# Patient Record
Sex: FEMALE | Race: BLACK OR AFRICAN AMERICAN | NOT HISPANIC OR LATINO | Employment: OTHER | URBAN - NONMETROPOLITAN AREA
[De-identification: names, ages, dates, MRNs, and addresses within clinical notes are randomized per-mention and may not be internally consistent; named-entity substitution may affect disease eponyms.]

---

## 2021-03-30 RX ORDER — METFORMIN HYDROCHLORIDE 500 MG/1
500 TABLET ORAL 2 TIMES DAILY
COMMUNITY
End: 2021-09-14 | Stop reason: SDUPTHER

## 2021-03-30 RX ORDER — CLONIDINE HYDROCHLORIDE 0.2 MG/1
TABLET ORAL
COMMUNITY
End: 2021-12-07 | Stop reason: SDUPTHER

## 2021-03-30 RX ORDER — PRAVASTATIN SODIUM 20 MG/1
20 TABLET ORAL NIGHTLY
COMMUNITY
End: 2021-12-07 | Stop reason: SDUPTHER

## 2021-03-30 RX ORDER — MECLIZINE HCL 12.5 MG 12.5 MG/1
12.5 TABLET ORAL 3 TIMES DAILY PRN
COMMUNITY
End: 2021-09-14 | Stop reason: SDUPTHER

## 2021-03-30 RX ORDER — OMEPRAZOLE 20 MG/1
20 CAPSULE, DELAYED RELEASE ORAL 2 TIMES DAILY
COMMUNITY
End: 2021-09-14 | Stop reason: SDUPTHER

## 2021-03-30 RX ORDER — DICLOFENAC SODIUM 50 MG/1
50 TABLET, DELAYED RELEASE ORAL 2 TIMES DAILY
COMMUNITY
End: 2023-05-26

## 2021-03-30 RX ORDER — FERROUS SULFATE 325(65) MG
325 TABLET ORAL DAILY
COMMUNITY
End: 2021-06-23 | Stop reason: SDUPTHER

## 2021-03-30 RX ORDER — HYDROCHLOROTHIAZIDE 25 MG/1
25 TABLET ORAL DAILY
COMMUNITY
End: 2021-12-07 | Stop reason: SDUPTHER

## 2021-03-30 RX ORDER — TRIAMCINOLONE ACETONIDE 1 MG/G
CREAM TOPICAL 2 TIMES DAILY
COMMUNITY
End: 2022-09-22 | Stop reason: SDUPTHER

## 2021-03-30 RX ORDER — CARVEDILOL 3.12 MG/1
3.12 TABLET ORAL 2 TIMES DAILY
COMMUNITY
End: 2021-11-11 | Stop reason: SDUPTHER

## 2021-03-30 RX ORDER — AMLODIPINE AND BENAZEPRIL HYDROCHLORIDE 5; 10 MG/1; MG/1
1 CAPSULE ORAL DAILY
COMMUNITY
End: 2021-12-07 | Stop reason: SDUPTHER

## 2021-03-30 RX ORDER — ALPRAZOLAM 0.25 MG/1
TABLET ORAL 2 TIMES DAILY PRN
COMMUNITY
End: 2021-06-23 | Stop reason: SDUPTHER

## 2021-03-30 RX ORDER — DICLOFENAC SODIUM 10 MG/G
2 GEL TOPICAL 2 TIMES DAILY
COMMUNITY
End: 2023-05-26

## 2021-06-23 ENCOUNTER — OFFICE VISIT (OUTPATIENT)
Dept: FAMILY MEDICINE | Facility: CLINIC | Age: 76
End: 2021-06-23
Payer: MEDICARE

## 2021-06-23 VITALS
DIASTOLIC BLOOD PRESSURE: 82 MMHG | OXYGEN SATURATION: 99 % | TEMPERATURE: 97 F | SYSTOLIC BLOOD PRESSURE: 120 MMHG | WEIGHT: 146.63 LBS | HEART RATE: 82 BPM | RESPIRATION RATE: 18 BRPM

## 2021-06-23 DIAGNOSIS — D50.9 IRON DEFICIENCY ANEMIA, UNSPECIFIED IRON DEFICIENCY ANEMIA TYPE: Primary | ICD-10-CM

## 2021-06-23 DIAGNOSIS — F41.9 ANXIETY: ICD-10-CM

## 2021-06-23 LAB
BASOPHILS # BLD AUTO: 0.03 K/UL (ref 0–0.2)
BASOPHILS NFR BLD AUTO: 0.6 % (ref 0–1)
DIFFERENTIAL METHOD BLD: ABNORMAL
EOSINOPHIL # BLD AUTO: 0.39 K/UL (ref 0–0.5)
EOSINOPHIL NFR BLD AUTO: 7.6 % (ref 1–4)
ERYTHROCYTE [DISTWIDTH] IN BLOOD BY AUTOMATED COUNT: 12.3 % (ref 11.5–14.5)
HCT VFR BLD AUTO: 36.6 % (ref 38–47)
HGB BLD-MCNC: 12.2 G/DL (ref 12–16)
IMM GRANULOCYTES # BLD AUTO: 0.01 K/UL (ref 0–0.04)
IMM GRANULOCYTES NFR BLD: 0.2 % (ref 0–0.4)
IRON SATN MFR SERPL: 24 % (ref 14–50)
IRON SERPL-MCNC: 69 ΜG/DL (ref 50–170)
LYMPHOCYTES # BLD AUTO: 2.24 K/UL (ref 1–4.8)
LYMPHOCYTES NFR BLD AUTO: 43.9 % (ref 27–41)
MCH RBC QN AUTO: 31.5 PG (ref 27–31)
MCHC RBC AUTO-ENTMCNC: 33.3 G/DL (ref 32–36)
MCV RBC AUTO: 94.6 FL (ref 80–96)
MONOCYTES # BLD AUTO: 0.53 K/UL (ref 0–0.8)
MONOCYTES NFR BLD AUTO: 10.4 % (ref 2–6)
MPC BLD CALC-MCNC: 11.3 FL (ref 9.4–12.4)
NEUTROPHILS # BLD AUTO: 1.9 K/UL (ref 1.8–7.7)
NEUTROPHILS NFR BLD AUTO: 37.3 % (ref 53–65)
NRBC # BLD AUTO: 0 X10E3/UL
NRBC, AUTO (.00): 0 %
PLATELET # BLD AUTO: 294 K/UL (ref 150–400)
RBC # BLD AUTO: 3.87 M/UL (ref 4.2–5.4)
TIBC SERPL-MCNC: 284 ΜG/DL (ref 250–450)
WBC # BLD AUTO: 5.1 K/UL (ref 4.5–11)

## 2021-06-23 PROCEDURE — 83540 ASSAY OF IRON: CPT | Mod: ,,, | Performed by: CLINICAL MEDICAL LABORATORY

## 2021-06-23 PROCEDURE — 85025 CBC WITH DIFFERENTIAL: ICD-10-PCS | Mod: ,,, | Performed by: CLINICAL MEDICAL LABORATORY

## 2021-06-23 PROCEDURE — 83550 IRON BINDING TEST: CPT | Mod: ,,, | Performed by: CLINICAL MEDICAL LABORATORY

## 2021-06-23 PROCEDURE — 83540 IRON AND TIBC: ICD-10-PCS | Mod: ,,, | Performed by: CLINICAL MEDICAL LABORATORY

## 2021-06-23 PROCEDURE — 83550 IRON AND TIBC: ICD-10-PCS | Mod: ,,, | Performed by: CLINICAL MEDICAL LABORATORY

## 2021-06-23 PROCEDURE — 99213 PR OFFICE/OUTPT VISIT, EST, LEVL III, 20-29 MIN: ICD-10-PCS | Mod: ,,, | Performed by: NURSE PRACTITIONER

## 2021-06-23 PROCEDURE — 99213 OFFICE O/P EST LOW 20 MIN: CPT | Mod: ,,, | Performed by: NURSE PRACTITIONER

## 2021-06-23 PROCEDURE — 85025 COMPLETE CBC W/AUTO DIFF WBC: CPT | Mod: ,,, | Performed by: CLINICAL MEDICAL LABORATORY

## 2021-06-23 RX ORDER — FERROUS SULFATE 325(65) MG
325 TABLET ORAL DAILY
Qty: 90 TABLET | Refills: 1 | Status: SHIPPED | OUTPATIENT
Start: 2021-06-23 | End: 2021-12-07 | Stop reason: SDUPTHER

## 2021-06-23 RX ORDER — ALPRAZOLAM 0.25 MG/1
0.25 TABLET ORAL 2 TIMES DAILY PRN
Qty: 60 TABLET | Refills: 2 | Status: SHIPPED | OUTPATIENT
Start: 2021-06-23 | End: 2021-09-22 | Stop reason: SDUPTHER

## 2021-09-14 DIAGNOSIS — R42 DIZZINESS: ICD-10-CM

## 2021-09-14 DIAGNOSIS — E11.9 TYPE 2 DIABETES MELLITUS WITHOUT COMPLICATION, WITHOUT LONG-TERM CURRENT USE OF INSULIN: ICD-10-CM

## 2021-09-14 DIAGNOSIS — K21.9 GASTROESOPHAGEAL REFLUX DISEASE, UNSPECIFIED WHETHER ESOPHAGITIS PRESENT: Primary | ICD-10-CM

## 2021-09-14 RX ORDER — MECLIZINE HCL 12.5 MG 12.5 MG/1
12.5 TABLET ORAL 3 TIMES DAILY PRN
Qty: 90 TABLET | Refills: 5 | Status: SHIPPED | OUTPATIENT
Start: 2021-09-14 | End: 2021-12-21 | Stop reason: DRUGHIGH

## 2021-09-14 RX ORDER — METFORMIN HYDROCHLORIDE 500 MG/1
500 TABLET ORAL 2 TIMES DAILY
Qty: 180 TABLET | Refills: 0 | Status: SHIPPED | OUTPATIENT
Start: 2021-09-14 | End: 2021-12-07 | Stop reason: SDUPTHER

## 2021-09-14 RX ORDER — OMEPRAZOLE 20 MG/1
20 CAPSULE, DELAYED RELEASE ORAL 2 TIMES DAILY
Qty: 180 CAPSULE | Refills: 1 | Status: SHIPPED | OUTPATIENT
Start: 2021-09-14 | End: 2021-12-21 | Stop reason: SDUPTHER

## 2021-09-22 ENCOUNTER — OFFICE VISIT (OUTPATIENT)
Dept: FAMILY MEDICINE | Facility: CLINIC | Age: 76
End: 2021-09-22
Payer: MEDICARE

## 2021-09-22 VITALS
SYSTOLIC BLOOD PRESSURE: 124 MMHG | DIASTOLIC BLOOD PRESSURE: 82 MMHG | WEIGHT: 147.19 LBS | TEMPERATURE: 97 F | BODY MASS INDEX: 25.13 KG/M2 | OXYGEN SATURATION: 99 % | HEIGHT: 64 IN | RESPIRATION RATE: 20 BRPM | HEART RATE: 78 BPM

## 2021-09-22 DIAGNOSIS — E83.59 OTHER DISORDERS OF CALCIUM METABOLISM: ICD-10-CM

## 2021-09-22 DIAGNOSIS — F41.9 ANXIETY: ICD-10-CM

## 2021-09-22 DIAGNOSIS — R53.83 FATIGUE, UNSPECIFIED TYPE: Primary | ICD-10-CM

## 2021-09-22 DIAGNOSIS — I10 ESSENTIAL HYPERTENSION, BENIGN: ICD-10-CM

## 2021-09-22 DIAGNOSIS — D53.9 NUTRITIONAL ANEMIA, UNSPECIFIED: ICD-10-CM

## 2021-09-22 DIAGNOSIS — E11.9 TYPE 2 DIABETES MELLITUS WITHOUT COMPLICATION, WITHOUT LONG-TERM CURRENT USE OF INSULIN: ICD-10-CM

## 2021-09-22 DIAGNOSIS — K21.9 GASTROESOPHAGEAL REFLUX DISEASE, UNSPECIFIED WHETHER ESOPHAGITIS PRESENT: ICD-10-CM

## 2021-09-22 LAB
25(OH)D3 SERPL-MCNC: 28.1 NG/ML
ALBUMIN SERPL BCP-MCNC: 3.9 G/DL (ref 3.5–5)
ALBUMIN/GLOB SERPL: 1 {RATIO}
ALP SERPL-CCNC: 110 U/L (ref 55–142)
ALT SERPL W P-5'-P-CCNC: 16 U/L (ref 13–56)
ANION GAP SERPL CALCULATED.3IONS-SCNC: 10 MMOL/L (ref 7–16)
AST SERPL W P-5'-P-CCNC: 9 U/L (ref 15–37)
BASOPHILS # BLD AUTO: 0.02 K/UL (ref 0–0.2)
BASOPHILS NFR BLD AUTO: 0.4 % (ref 0–1)
BILIRUB SERPL-MCNC: 0.3 MG/DL (ref 0–1.2)
BUN SERPL-MCNC: 8 MG/DL (ref 7–18)
BUN/CREAT SERPL: 10 (ref 6–20)
CALCIUM SERPL-MCNC: 9.9 MG/DL (ref 8.5–10.1)
CHLORIDE SERPL-SCNC: 94 MMOL/L (ref 98–107)
CHOLEST SERPL-MCNC: 207 MG/DL (ref 0–200)
CHOLEST/HDLC SERPL: 2.7 {RATIO}
CO2 SERPL-SCNC: 28 MMOL/L (ref 21–32)
CREAT SERPL-MCNC: 0.81 MG/DL (ref 0.55–1.02)
DIFFERENTIAL METHOD BLD: ABNORMAL
EOSINOPHIL # BLD AUTO: 0.23 K/UL (ref 0–0.5)
EOSINOPHIL NFR BLD AUTO: 4.9 % (ref 1–4)
ERYTHROCYTE [DISTWIDTH] IN BLOOD BY AUTOMATED COUNT: 12.2 % (ref 11.5–14.5)
EST. AVERAGE GLUCOSE BLD GHB EST-MCNC: 117 MG/DL
GLOBULIN SER-MCNC: 4 G/DL (ref 2–4)
GLUCOSE SERPL-MCNC: 126 MG/DL (ref 74–106)
HBA1C MFR BLD HPLC: 6.1 % (ref 4.5–6.6)
HCT VFR BLD AUTO: 32.3 % (ref 38–47)
HDLC SERPL-MCNC: 78 MG/DL (ref 40–60)
HGB BLD-MCNC: 11.3 G/DL (ref 12–16)
IMM GRANULOCYTES # BLD AUTO: 0.01 K/UL (ref 0–0.04)
IMM GRANULOCYTES NFR BLD: 0.2 % (ref 0–0.4)
LDLC SERPL CALC-MCNC: 115 MG/DL
LDLC/HDLC SERPL: 1.5 {RATIO}
LYMPHOCYTES # BLD AUTO: 1.88 K/UL (ref 1–4.8)
LYMPHOCYTES NFR BLD AUTO: 40.2 % (ref 27–41)
MCH RBC QN AUTO: 32.5 PG (ref 27–31)
MCHC RBC AUTO-ENTMCNC: 35 G/DL (ref 32–36)
MCV RBC AUTO: 92.8 FL (ref 80–96)
MONOCYTES # BLD AUTO: 0.59 K/UL (ref 0–0.8)
MONOCYTES NFR BLD AUTO: 12.6 % (ref 2–6)
MPC BLD CALC-MCNC: 10.9 FL (ref 9.4–12.4)
NEUTROPHILS # BLD AUTO: 1.95 K/UL (ref 1.8–7.7)
NEUTROPHILS NFR BLD AUTO: 41.7 % (ref 53–65)
NONHDLC SERPL-MCNC: 129 MG/DL
NRBC # BLD AUTO: 0 X10E3/UL
NRBC, AUTO (.00): 0 %
PLATELET # BLD AUTO: 283 K/UL (ref 150–400)
POTASSIUM SERPL-SCNC: 3.2 MMOL/L (ref 3.5–5.1)
PROT SERPL-MCNC: 7.9 G/DL (ref 6.4–8.2)
RBC # BLD AUTO: 3.48 M/UL (ref 4.2–5.4)
SODIUM SERPL-SCNC: 129 MMOL/L (ref 136–145)
TRIGL SERPL-MCNC: 68 MG/DL (ref 35–150)
VIT B12 SERPL-MCNC: 776 PG/ML (ref 193–986)
VLDLC SERPL-MCNC: 14 MG/DL
WBC # BLD AUTO: 4.68 K/UL (ref 4.5–11)

## 2021-09-22 PROCEDURE — 82306 VITAMIN D 25 HYDROXY: CPT | Mod: ,,, | Performed by: CLINICAL MEDICAL LABORATORY

## 2021-09-22 PROCEDURE — 80053 COMPREHEN METABOLIC PANEL: CPT | Mod: ,,, | Performed by: CLINICAL MEDICAL LABORATORY

## 2021-09-22 PROCEDURE — 82607 VITAMIN B-12: CPT | Mod: ,,, | Performed by: CLINICAL MEDICAL LABORATORY

## 2021-09-22 PROCEDURE — 85025 COMPLETE CBC W/AUTO DIFF WBC: CPT | Mod: ,,, | Performed by: CLINICAL MEDICAL LABORATORY

## 2021-09-22 PROCEDURE — 99213 OFFICE O/P EST LOW 20 MIN: CPT | Mod: ,,, | Performed by: NURSE PRACTITIONER

## 2021-09-22 PROCEDURE — 85025 CBC WITH DIFFERENTIAL: ICD-10-PCS | Mod: ,,, | Performed by: CLINICAL MEDICAL LABORATORY

## 2021-09-22 PROCEDURE — 83036 HEMOGLOBIN GLYCOSYLATED A1C: CPT | Mod: ,,, | Performed by: CLINICAL MEDICAL LABORATORY

## 2021-09-22 PROCEDURE — 83036 HEMOGLOBIN A1C: ICD-10-PCS | Mod: ,,, | Performed by: CLINICAL MEDICAL LABORATORY

## 2021-09-22 PROCEDURE — 80061 LIPID PANEL: ICD-10-PCS | Mod: ,,, | Performed by: CLINICAL MEDICAL LABORATORY

## 2021-09-22 PROCEDURE — 80053 COMPREHENSIVE METABOLIC PANEL: ICD-10-PCS | Mod: ,,, | Performed by: CLINICAL MEDICAL LABORATORY

## 2021-09-22 PROCEDURE — 80061 LIPID PANEL: CPT | Mod: ,,, | Performed by: CLINICAL MEDICAL LABORATORY

## 2021-09-22 PROCEDURE — 82607 VITAMIN B12: ICD-10-PCS | Mod: ,,, | Performed by: CLINICAL MEDICAL LABORATORY

## 2021-09-22 PROCEDURE — 99213 PR OFFICE/OUTPT VISIT, EST, LEVL III, 20-29 MIN: ICD-10-PCS | Mod: ,,, | Performed by: NURSE PRACTITIONER

## 2021-09-22 PROCEDURE — 82306 VITAMIN D: ICD-10-PCS | Mod: ,,, | Performed by: CLINICAL MEDICAL LABORATORY

## 2021-09-22 RX ORDER — TIZANIDINE 2 MG/1
TABLET ORAL
COMMUNITY
Start: 2021-04-13 | End: 2023-05-26

## 2021-09-22 RX ORDER — FLUTICASONE PROPIONATE 50 MCG
SPRAY, SUSPENSION (ML) NASAL
COMMUNITY
Start: 2021-05-26 | End: 2021-12-07 | Stop reason: SDUPTHER

## 2021-09-22 RX ORDER — MULTIVITAMIN WITH MINERALS
1 TABLET ORAL DAILY
COMMUNITY
Start: 2021-03-31 | End: 2021-09-22

## 2021-09-22 RX ORDER — ALPRAZOLAM 0.25 MG/1
0.25 TABLET ORAL 2 TIMES DAILY PRN
Qty: 60 TABLET | Refills: 2 | Status: SHIPPED | OUTPATIENT
Start: 2021-09-22 | End: 2021-12-21 | Stop reason: SDUPTHER

## 2021-11-12 RX ORDER — CARVEDILOL 3.12 MG/1
3.12 TABLET ORAL 2 TIMES DAILY
Qty: 60 TABLET | Refills: 0 | Status: SHIPPED | OUTPATIENT
Start: 2021-11-12 | End: 2021-12-07 | Stop reason: SDUPTHER

## 2021-12-07 DIAGNOSIS — E11.9 TYPE 2 DIABETES MELLITUS WITHOUT COMPLICATION, WITHOUT LONG-TERM CURRENT USE OF INSULIN: ICD-10-CM

## 2021-12-07 DIAGNOSIS — D50.9 IRON DEFICIENCY ANEMIA, UNSPECIFIED IRON DEFICIENCY ANEMIA TYPE: ICD-10-CM

## 2021-12-07 RX ORDER — HYDROCHLOROTHIAZIDE 25 MG/1
25 TABLET ORAL DAILY
Qty: 90 TABLET | Refills: 1 | Status: SHIPPED | OUTPATIENT
Start: 2021-12-07 | End: 2022-03-24 | Stop reason: SDUPTHER

## 2021-12-07 RX ORDER — CLONIDINE HYDROCHLORIDE 0.2 MG/1
TABLET ORAL
Qty: 180 TABLET | Refills: 0 | Status: SHIPPED | OUTPATIENT
Start: 2021-12-07 | End: 2022-03-24 | Stop reason: SDUPTHER

## 2021-12-07 RX ORDER — PRAVASTATIN SODIUM 20 MG/1
20 TABLET ORAL NIGHTLY
Qty: 90 TABLET | Refills: 1 | Status: SHIPPED | OUTPATIENT
Start: 2021-12-07 | End: 2022-03-24 | Stop reason: SDUPTHER

## 2021-12-07 RX ORDER — FLUTICASONE PROPIONATE 50 MCG
SPRAY, SUSPENSION (ML) NASAL
Qty: 48 G | Refills: 2 | Status: SHIPPED | OUTPATIENT
Start: 2021-12-07 | End: 2022-06-24 | Stop reason: SDUPTHER

## 2021-12-07 RX ORDER — AMLODIPINE AND BENAZEPRIL HYDROCHLORIDE 5; 10 MG/1; MG/1
1 CAPSULE ORAL DAILY
Qty: 90 CAPSULE | Refills: 1 | Status: SHIPPED | OUTPATIENT
Start: 2021-12-07 | End: 2021-12-21

## 2021-12-07 RX ORDER — FERROUS SULFATE 325(65) MG
325 TABLET ORAL DAILY
Qty: 90 TABLET | Refills: 1 | Status: SHIPPED | OUTPATIENT
Start: 2021-12-07 | End: 2022-03-24 | Stop reason: SDUPTHER

## 2021-12-07 RX ORDER — CARVEDILOL 3.12 MG/1
3.12 TABLET ORAL 2 TIMES DAILY
Qty: 180 TABLET | Refills: 1 | Status: SHIPPED | OUTPATIENT
Start: 2021-12-07 | End: 2021-12-21 | Stop reason: DRUGHIGH

## 2021-12-07 RX ORDER — METFORMIN HYDROCHLORIDE 500 MG/1
500 TABLET ORAL 2 TIMES DAILY
Qty: 180 TABLET | Refills: 0 | Status: SHIPPED | OUTPATIENT
Start: 2021-12-07 | End: 2022-03-24 | Stop reason: SDUPTHER

## 2021-12-21 ENCOUNTER — OFFICE VISIT (OUTPATIENT)
Dept: FAMILY MEDICINE | Facility: CLINIC | Age: 76
End: 2021-12-21
Payer: MEDICARE

## 2021-12-21 VITALS
WEIGHT: 148.81 LBS | SYSTOLIC BLOOD PRESSURE: 126 MMHG | DIASTOLIC BLOOD PRESSURE: 76 MMHG | BODY MASS INDEX: 25.41 KG/M2 | OXYGEN SATURATION: 99 % | HEART RATE: 84 BPM | HEIGHT: 64 IN | TEMPERATURE: 97 F | RESPIRATION RATE: 20 BRPM

## 2021-12-21 DIAGNOSIS — R42 DIZZINESS: ICD-10-CM

## 2021-12-21 DIAGNOSIS — F41.9 ANXIETY: ICD-10-CM

## 2021-12-21 DIAGNOSIS — K21.9 GASTROESOPHAGEAL REFLUX DISEASE, UNSPECIFIED WHETHER ESOPHAGITIS PRESENT: ICD-10-CM

## 2021-12-21 DIAGNOSIS — I10 ESSENTIAL HYPERTENSION, BENIGN: Primary | ICD-10-CM

## 2021-12-21 PROCEDURE — 99213 OFFICE O/P EST LOW 20 MIN: CPT | Mod: ,,, | Performed by: NURSE PRACTITIONER

## 2021-12-21 PROCEDURE — 99213 PR OFFICE/OUTPT VISIT, EST, LEVL III, 20-29 MIN: ICD-10-PCS | Mod: ,,, | Performed by: NURSE PRACTITIONER

## 2021-12-21 RX ORDER — CARVEDILOL 6.25 MG/1
6.25 TABLET ORAL 2 TIMES DAILY WITH MEALS
COMMUNITY
Start: 2021-12-16 | End: 2021-12-21 | Stop reason: SDUPTHER

## 2021-12-21 RX ORDER — CARVEDILOL 6.25 MG/1
6.25 TABLET ORAL 2 TIMES DAILY WITH MEALS
Qty: 180 TABLET | Refills: 1 | Status: SHIPPED | OUTPATIENT
Start: 2021-12-21 | End: 2022-03-24 | Stop reason: SDUPTHER

## 2021-12-21 RX ORDER — AMLODIPINE BESYLATE 10 MG/1
10 TABLET ORAL DAILY
COMMUNITY
End: 2021-12-21 | Stop reason: SDUPTHER

## 2021-12-21 RX ORDER — MECLIZINE HYDROCHLORIDE 25 MG/1
25 TABLET ORAL 3 TIMES DAILY PRN
Qty: 180 TABLET | Refills: 2 | Status: SHIPPED | OUTPATIENT
Start: 2021-12-21 | End: 2022-03-24

## 2021-12-21 RX ORDER — ALPRAZOLAM 0.25 MG/1
0.25 TABLET ORAL 2 TIMES DAILY PRN
Qty: 60 TABLET | Refills: 2 | Status: SHIPPED | OUTPATIENT
Start: 2021-12-21 | End: 2022-03-24 | Stop reason: SDUPTHER

## 2021-12-21 RX ORDER — AMLODIPINE BESYLATE 10 MG/1
10 TABLET ORAL DAILY
Qty: 90 TABLET | Refills: 1 | Status: SHIPPED | OUTPATIENT
Start: 2021-12-21 | End: 2022-03-24 | Stop reason: SDUPTHER

## 2021-12-21 RX ORDER — MECLIZINE HCL 12.5 MG 12.5 MG/1
12.5 TABLET ORAL 3 TIMES DAILY PRN
Qty: 270 TABLET | Refills: 1 | Status: CANCELLED | OUTPATIENT
Start: 2021-12-21

## 2021-12-21 RX ORDER — OMEPRAZOLE 20 MG/1
20 CAPSULE, DELAYED RELEASE ORAL 2 TIMES DAILY
Qty: 180 CAPSULE | Refills: 1 | Status: SHIPPED | OUTPATIENT
Start: 2021-12-21 | End: 2022-03-14 | Stop reason: SDUPTHER

## 2021-12-28 ENCOUNTER — OFFICE VISIT (OUTPATIENT)
Dept: FAMILY MEDICINE | Facility: CLINIC | Age: 76
End: 2021-12-28
Payer: MEDICARE

## 2021-12-28 ENCOUNTER — IMMUNIZATION (OUTPATIENT)
Dept: FAMILY MEDICINE | Facility: CLINIC | Age: 76
End: 2021-12-28
Payer: MEDICARE

## 2021-12-28 VITALS
HEIGHT: 64 IN | WEIGHT: 148.19 LBS | DIASTOLIC BLOOD PRESSURE: 80 MMHG | RESPIRATION RATE: 18 BRPM | HEART RATE: 86 BPM | SYSTOLIC BLOOD PRESSURE: 126 MMHG | TEMPERATURE: 96 F | OXYGEN SATURATION: 98 % | BODY MASS INDEX: 25.3 KG/M2

## 2021-12-28 DIAGNOSIS — Z23 NEED FOR VACCINATION: Primary | ICD-10-CM

## 2021-12-28 DIAGNOSIS — Z00.00 WELL ADULT EXAM: Primary | ICD-10-CM

## 2021-12-28 PROCEDURE — G0439 PR MEDICARE ANNUAL WELLNESS SUBSEQUENT VISIT: ICD-10-PCS | Mod: ,,, | Performed by: NURSE PRACTITIONER

## 2021-12-28 PROCEDURE — 0064A COVID-19, MRNA, LNP-S, PF, 100 MCG/0.25 ML DOSE VACCINE (MODERNA BOOSTER): ICD-10-PCS | Mod: ,,, | Performed by: NURSE PRACTITIONER

## 2021-12-28 PROCEDURE — 91306 COVID-19, MRNA, LNP-S, PF, 100 MCG/0.25 ML DOSE VACCINE (MODERNA BOOSTER): CPT | Mod: ,,, | Performed by: NURSE PRACTITIONER

## 2021-12-28 PROCEDURE — 0064A COVID-19, MRNA, LNP-S, PF, 100 MCG/0.25 ML DOSE VACCINE (MODERNA BOOSTER): CPT | Mod: ,,, | Performed by: NURSE PRACTITIONER

## 2021-12-28 PROCEDURE — 91306 COVID-19, MRNA, LNP-S, PF, 100 MCG/0.25 ML DOSE VACCINE (MODERNA BOOSTER): ICD-10-PCS | Mod: ,,, | Performed by: NURSE PRACTITIONER

## 2021-12-28 PROCEDURE — 99499 NO LOS: ICD-10-PCS | Mod: ,,, | Performed by: NURSE PRACTITIONER

## 2021-12-28 PROCEDURE — 99499 UNLISTED E&M SERVICE: CPT | Mod: ,,, | Performed by: NURSE PRACTITIONER

## 2021-12-28 PROCEDURE — G0439 PPPS, SUBSEQ VISIT: HCPCS | Mod: ,,, | Performed by: NURSE PRACTITIONER

## 2022-01-11 ENCOUNTER — APPOINTMENT (OUTPATIENT)
Dept: RADIOLOGY | Facility: CLINIC | Age: 77
End: 2022-01-11
Attending: NURSE PRACTITIONER
Payer: MEDICARE

## 2022-01-11 ENCOUNTER — OFFICE VISIT (OUTPATIENT)
Dept: FAMILY MEDICINE | Facility: CLINIC | Age: 77
End: 2022-01-11
Payer: MEDICARE

## 2022-01-11 VITALS
DIASTOLIC BLOOD PRESSURE: 78 MMHG | RESPIRATION RATE: 18 BRPM | HEART RATE: 76 BPM | SYSTOLIC BLOOD PRESSURE: 120 MMHG | OXYGEN SATURATION: 97 % | HEIGHT: 64 IN | BODY MASS INDEX: 25.27 KG/M2 | WEIGHT: 148 LBS | TEMPERATURE: 97 F

## 2022-01-11 DIAGNOSIS — J01.00 ACUTE NON-RECURRENT MAXILLARY SINUSITIS: Primary | ICD-10-CM

## 2022-01-11 DIAGNOSIS — M79.642 HAND PAIN, LEFT: ICD-10-CM

## 2022-01-11 PROCEDURE — 99213 PR OFFICE/OUTPT VISIT, EST, LEVL III, 20-29 MIN: ICD-10-PCS | Mod: 25,,, | Performed by: NURSE PRACTITIONER

## 2022-01-11 PROCEDURE — 96372 PR INJECTION,THERAP/PROPH/DIAG2ST, IM OR SUBCUT: ICD-10-PCS | Mod: ,,, | Performed by: NURSE PRACTITIONER

## 2022-01-11 PROCEDURE — 96372 THER/PROPH/DIAG INJ SC/IM: CPT | Mod: ,,, | Performed by: NURSE PRACTITIONER

## 2022-01-11 PROCEDURE — 99213 OFFICE O/P EST LOW 20 MIN: CPT | Mod: 25,,, | Performed by: NURSE PRACTITIONER

## 2022-01-11 PROCEDURE — 73130 X-RAY EXAM OF HAND: CPT | Mod: TC,RHCUB,FY,LT | Performed by: NURSE PRACTITIONER

## 2022-01-11 RX ORDER — LINCOMYCIN HYDROCHLORIDE 300 MG/ML
600 INJECTION, SOLUTION INTRAMUSCULAR; INTRAVENOUS; SUBCONJUNCTIVAL
Status: COMPLETED | OUTPATIENT
Start: 2022-01-11 | End: 2022-01-11

## 2022-01-11 RX ORDER — TRIAMCINOLONE ACETONIDE 40 MG/ML
40 INJECTION, SUSPENSION INTRA-ARTICULAR; INTRAMUSCULAR
Status: COMPLETED | OUTPATIENT
Start: 2022-01-11 | End: 2022-01-11

## 2022-01-11 RX ORDER — AZITHROMYCIN 250 MG/1
TABLET, FILM COATED ORAL
Qty: 6 TABLET | Refills: 0 | Status: SHIPPED | OUTPATIENT
Start: 2022-01-11 | End: 2022-01-20 | Stop reason: ALTCHOICE

## 2022-01-11 RX ADMIN — LINCOMYCIN HYDROCHLORIDE 600 MG: 300 INJECTION, SOLUTION INTRAMUSCULAR; INTRAVENOUS; SUBCONJUNCTIVAL at 10:01

## 2022-01-11 RX ADMIN — TRIAMCINOLONE ACETONIDE 40 MG: 40 INJECTION, SUSPENSION INTRA-ARTICULAR; INTRAMUSCULAR at 10:01

## 2022-01-11 NOTE — PROGRESS NOTES
WHIT Casper   [facility]  [address]  [place] [address]     PATIENT NAME: Yana Oates  : 1945  DATE: 22  MRN: [id number]      Billing Provider: WHIT Casper  Level of Service: ND OFFICE/OUTPT VISIT, EST, LEVL III, 20-29 MIN  Patient PCP Information     Provider PCP Type    WHIT Casper General          Reason for Visit / Chief Complaint: Dizziness (Chronic dizziness is worsening.) and Hand Pain (L hand pain/edema./States she hit it on something 2 days ago and its hurt since then.)       Update PCP  Update Chief Complaint         History of Present Illness / Problem Focused Workflow     Yana Oates presents to the clinic c/o dizziness for several days, meclizine has not helped much. Denies cough, fever, runny nose; has had slight congestion.  pain in left hand after she hit it on her heater at home putting wood in 2 days ago.      Review of Systems     Review of Systems   Constitutional: Negative.  Negative for activity change, appetite change and unexpected weight change.   Eyes: Negative for visual disturbance.   Respiratory: Negative for chest tightness and shortness of breath.    Cardiovascular: Negative for chest pain, palpitations and leg swelling.   Gastrointestinal: Negative for abdominal pain, blood in stool, nausea and vomiting.   Endocrine: Negative for polydipsia, polyphagia and polyuria.   Musculoskeletal: Positive for joint swelling (left dorsal hand).   Neurological: Positive for dizziness. Negative for speech difficulty, weakness, numbness and headaches.        Medical / Social / Family History     Past Medical History:   Diagnosis Date    Anxiety     Diabetes mellitus, type 2     Essential hypertension, benign     Fatigue     GERD (gastroesophageal reflux disease)     Low back pain     Osteoarthritis     Other long term (current) drug therapy     Strain of muscle and tendon of back wall of thorax, initial encounter        Past  Surgical History:   Procedure Laterality Date    APPENDECTOMY      CHOLECYSTECTOMY      HYSTERECTOMY      partial    LIPOMA RESECTION Left     LUNG LOBECTOMY Right     due to trauma       Social History  Ms.  reports that she has never smoked. She has never used smokeless tobacco. She reports that she does not drink alcohol and does not use drugs.    Family History  Ms.'s family history includes Arthritis in her sister; Asthma in her father; Atrial fibrillation in her sister; Brain cancer in her brother; COPD in her brother, brother, and sister; Cancer in her brother and brother; Cervical cancer in her daughter; Heart disease in her brother and mother; Hypertension in her mother; Lung cancer in her brother; No Known Problems in her brother, brother, daughter, daughter, daughter, daughter, daughter, daughter, sister, sister, and sister; Osteoarthritis in her mother; Thyroid disease in her daughter.    Medications and Allergies     Medications  Outpatient Medications Marked as Taking for the 1/11/22 encounter (Office Visit) with WHIT Ramey   Medication Sig Dispense Refill    ALPRAZolam (XANAX) 0.25 MG tablet Take 1 tablet (0.25 mg total) by mouth 2 (two) times daily as needed for Anxiety. 60 tablet 2    amLODIPine (NORVASC) 10 MG tablet Take 1 tablet (10 mg total) by mouth once daily. 90 tablet 1    blood sugar diagnostic Strp by Misc.(Non-Drug; Combo Route) route. CHECK BLOOD SUGAR ONE TIME DAILY AND AS NEEDED      carvediloL (COREG) 6.25 MG tablet Take 1 tablet (6.25 mg total) by mouth 2 (two) times daily with meals. 180 tablet 1    cloNIDine (CATAPRES) 0.2 MG tablet TAKE ONE-HALF TO ONE TAB TWICE DAILY 180 tablet 0    diclofenac (VOLTAREN) 50 MG EC tablet Take 50 mg by mouth 2 (two) times daily.      diclofenac sodium (VOLTAREN) 1 % Gel Apply 2 g topically 2 (two) times daily.      ferrous sulfate (IRON, FERROUS SULFATE,) 325 mg (65 mg iron) Tab tablet Take 1 tablet (325 mg total) by mouth  once daily. 90 tablet 1    fluticasone propionate (FLONASE) 50 mcg/actuation nasal spray USE 1 SPRAY IN EACH NOSTRIL 2 TIMES A DAY ((SHAKE WELL)) 48 g 2    hydroCHLOROthiazide (HYDRODIURIL) 25 MG tablet Take 1 tablet (25 mg total) by mouth once daily. 90 tablet 1    magnesium chloride (MAG 64) 64 mg TbEC Take 1 tablet (64 mg total) by mouth 2 (two) times daily. 180 tablet 1    meclizine (ANTIVERT) 25 mg tablet Take 1 tablet (25 mg total) by mouth 3 (three) times daily as needed for Dizziness. 180 tablet 2    metFORMIN (GLUCOPHAGE) 500 MG tablet Take 1 tablet (500 mg total) by mouth 2 (two) times a day. 180 tablet 0    multivitamin-iron-folic acid Tab Take 1 tablet by mouth once daily.      omeprazole (PRILOSEC) 20 MG capsule Take 1 capsule (20 mg total) by mouth 2 (two) times a day. 180 capsule 1    pravastatin (PRAVACHOL) 20 MG tablet Take 1 tablet (20 mg total) by mouth nightly. 90 tablet 1    tiZANidine (ZANAFLEX) 2 MG tablet TAKE 1 TABLET BY MOUTH EVERY DAY AT BEDTIME FOR MUSCLE SPASMS (MAY CAUSE DROWSINESS)      triamcinolone acetonide 0.1% (KENALOG) 0.1 % cream Apply topically 2 (two) times daily. APPLY TO AFFECTED AREA TWO TIMES DAILY       Current Facility-Administered Medications for the 1/11/22 encounter (Office Visit) with WHIT Ramey   Medication Dose Route Frequency Provider Last Rate Last Admin    [COMPLETED] lincomycin injection 600 mg  600 mg Intramuscular 1 time in Clinic/HOD WHIT Ramey   600 mg at 01/11/22 1020    [COMPLETED] triamcinolone acetonide injection 40 mg  40 mg Intramuscular 1 time in Clinic/HOD WHIT Ramey   40 mg at 01/11/22 1020       Allergies  Review of patient's allergies indicates:   Allergen Reactions    Ace inhibitors Anaphylaxis    Penicillins Anaphylaxis    Bactrim [sulfamethoxazole-trimethoprim]     Biaxin [clarithromycin]     Ciprofloxacin     Iodinated contrast media     Iodine and iodide containing products Hives     "Mobic [meloxicam]     Sulfa (sulfonamide antibiotics) Hives and Rash       Physical Examination     Vitals:    01/11/22 0932   BP: 120/78   BP Location: Left arm   Patient Position: Sitting   BP Method: Medium (Manual)   Pulse: 76   Resp: 18   Temp: 97.1 °F (36.2 °C)   TempSrc: Temporal   SpO2: 97%   Weight: 67.1 kg (148 lb)   Height: 5' 4" (1.626 m)      Physical Exam  Constitutional:       General: She is not in acute distress.     Appearance: Normal appearance.   HENT:      Right Ear: Hearing normal. Tympanic membrane is not erythematous, retracted or bulging.      Left Ear: Hearing normal. Tympanic membrane is not erythematous, retracted or bulging.      Ears:      Comments: Small amt of clear fluid behind michelle TM's       Nose: Congestion present. No rhinorrhea.      Right Turbinates: Not swollen.      Left Turbinates: Not swollen.      Mouth/Throat:      Mouth: Mucous membranes are moist.      Pharynx: No oropharyngeal exudate or posterior oropharyngeal erythema.   Cardiovascular:      Rate and Rhythm: Normal rate and regular rhythm.      Pulses:           Carotid pulses are 3+ on the right side and 3+ on the left side.       Radial pulses are 3+ on the left side.      Heart sounds: Normal heart sounds.   Pulmonary:      Effort: Pulmonary effort is normal. No tachypnea or respiratory distress.      Breath sounds: Normal breath sounds. No wheezing, rhonchi or rales.   Abdominal:      Palpations: Abdomen is soft.   Musculoskeletal:      Left hand: Swelling and tenderness present. Normal capillary refill.        Arms:       Cervical back: Neck supple.      Right lower leg: No edema.      Left lower leg: No edema.   Skin:     General: Skin is warm and dry.   Neurological:      Mental Status: She is alert and oriented to person, place, and time.          Assessment and Plan (including Health Maintenance)      Problem List  Smart Sets  Document Outside HM   :        Health Maintenance Due   Topic Date Due    " Diabetes Urine Screening  Never done    Eye Exam  Never done    DEXA SCAN  Never done       Problem List Items Addressed This Visit    None     Visit Diagnoses     Acute non-recurrent maxillary sinusitis    -  Primary    Will treat sinusitis with lincocin and kenolog injection along with zithromax for 5 days. Continue meclizine as directed.    Relevant Medications    triamcinolone acetonide injection 40 mg (Completed)    lincomycin injection 600 mg (Completed)    Hand pain, left        Plain film obtained and sent to radiology. Contusion    Relevant Orders    X-Ray Hand 3 View Left (Completed)        Acute non-recurrent maxillary sinusitis  Comments:  Will treat sinusitis with lincocin and kenolog injection along with zithromax for 5 days. Continue meclizine as directed.  Orders:  -     triamcinolone acetonide injection 40 mg  -     lincomycin injection 600 mg    Hand pain, left  Comments:  Plain film obtained and sent to radiology. Contusion  Orders:  -     X-Ray Hand 3 View Left; Future; Expected date: 01/11/2022    Other orders  -     azithromycin (ZITHROMAX Z-IMELDA) 250 MG tablet; Take 2 tabs by mouth today then 1 tab daily x 4 days  Dispense: 6 tablet; Refill: 0       Health Maintenance Topics with due status: Not Due       Topic Last Completion Date    Lipid Panel 09/22/2021    Hemoglobin A1c 09/22/2021    Foot Exam 12/21/2021       Procedures     Future Appointments   Date Time Provider Department Center   1/3/2023 10:00 AM [facility] NURSE [facility]        Follow up in about 1 week (around 1/18/2022), or if symptoms worsen or fail to improve.     Signature:  WHIT Casper    Date of encounter: 1/11/22

## 2022-01-20 ENCOUNTER — OFFICE VISIT (OUTPATIENT)
Dept: FAMILY MEDICINE | Facility: CLINIC | Age: 77
End: 2022-01-20

## 2022-01-20 VITALS
WEIGHT: 146 LBS | DIASTOLIC BLOOD PRESSURE: 80 MMHG | SYSTOLIC BLOOD PRESSURE: 118 MMHG | BODY MASS INDEX: 24.92 KG/M2 | OXYGEN SATURATION: 99 % | HEIGHT: 64 IN | HEART RATE: 82 BPM

## 2022-01-20 DIAGNOSIS — H65.93 OTITIS MEDIA WITH EFFUSION, BILATERAL: Primary | ICD-10-CM

## 2022-01-20 PROCEDURE — 99213 OFFICE O/P EST LOW 20 MIN: CPT | Mod: ,,, | Performed by: NURSE PRACTITIONER

## 2022-01-20 PROCEDURE — 99213 PR OFFICE/OUTPT VISIT, EST, LEVL III, 20-29 MIN: ICD-10-PCS | Mod: ,,, | Performed by: NURSE PRACTITIONER

## 2022-01-20 RX ORDER — CETIRIZINE HYDROCHLORIDE, PSEUDOEPHEDRINE HYDROCHLORIDE 5; 120 MG/1; MG/1
1 TABLET, FILM COATED, EXTENDED RELEASE ORAL DAILY
Qty: 10 TABLET | Refills: 0 | Status: SHIPPED | OUTPATIENT
Start: 2022-01-20 | End: 2022-01-30

## 2022-01-20 NOTE — PROGRESS NOTES
[facility]    Chief Complaint      Chief Complaint   Patient presents with    Dizziness     History of Present Illness      Yana Oates is a 77 y.o. female with chronic conditions of anxiety, T2DM, and hypertension who presents today for c/o dizziness.    Dizziness:   Chronicity:  Recurrent  Onset:  1 to 4 weeks ago  Progression since onset:  Resolved, then recurrent and waxing and waning  Frequency:  Constantly  Severity:  Mild  Duration:  Off/on all day  Dizziness characteristics:  Sensation of movement and spinning inside head only  Frequency of Spells:  Daily   Associated symptoms: hearing loss, ear congestion and tinnitus.no ear pain, no fever, no headaches and no nausea.  Aggravated by:  Nothing  Treatments tried:  Meclizine and rest  Improvements on treatment:  Mildno strokes, no cardiac surgery, no neurologic disease, no head trauma, no balance testing, no ear trauma, no ear surgery, no head trauma, no ear infections, no anxiety, no ear tubes, no environmental allergies, no MRI head and no CT head.    Past Medical History:  Past Medical History:   Diagnosis Date    Anxiety     Diabetes mellitus, type 2     Essential hypertension, benign     Fatigue     GERD (gastroesophageal reflux disease)     Low back pain     Osteoarthritis     Other long term (current) drug therapy     Strain of muscle and tendon of back wall of thorax, initial encounter      Past Surgical History:   has a past surgical history that includes Lung lobectomy (Right); Cholecystectomy; Lipoma resection (Left); Appendectomy; and Hysterectomy.    Social History:  Social History     Tobacco Use    Smoking status: Never Smoker    Smokeless tobacco: Never Used   Substance Use Topics    Alcohol use: Never    Drug use: Never     I personally reviewed all past medical, surgical, and social.     Review of Systems   Constitutional: Negative for fever.   HENT: Positive for hearing loss and tinnitus. Negative for congestion,  ear discharge, ear pain and sore throat.    Gastrointestinal: Negative for nausea.   Neurological: Positive for dizziness. Negative for headaches.   Endo/Heme/Allergies: Negative for environmental allergies.     Medications:  Outpatient Encounter Medications as of 1/20/2022   Medication Sig Dispense Refill    ALPRAZolam (XANAX) 0.25 MG tablet Take 1 tablet (0.25 mg total) by mouth 2 (two) times daily as needed for Anxiety. 60 tablet 2    amLODIPine (NORVASC) 10 MG tablet Take 1 tablet (10 mg total) by mouth once daily. 90 tablet 1    blood sugar diagnostic Strp by Misc.(Non-Drug; Combo Route) route. CHECK BLOOD SUGAR ONE TIME DAILY AND AS NEEDED      carvediloL (COREG) 6.25 MG tablet Take 1 tablet (6.25 mg total) by mouth 2 (two) times daily with meals. 180 tablet 1    cloNIDine (CATAPRES) 0.2 MG tablet TAKE ONE-HALF TO ONE TAB TWICE DAILY 180 tablet 0    diclofenac (VOLTAREN) 50 MG EC tablet Take 50 mg by mouth 2 (two) times daily.      diclofenac sodium (VOLTAREN) 1 % Gel Apply 2 g topically 2 (two) times daily.      ferrous sulfate (IRON, FERROUS SULFATE,) 325 mg (65 mg iron) Tab tablet Take 1 tablet (325 mg total) by mouth once daily. 90 tablet 1    fluticasone propionate (FLONASE) 50 mcg/actuation nasal spray USE 1 SPRAY IN EACH NOSTRIL 2 TIMES A DAY ((SHAKE WELL)) 48 g 2    hydroCHLOROthiazide (HYDRODIURIL) 25 MG tablet Take 1 tablet (25 mg total) by mouth once daily. 90 tablet 1    magnesium chloride (MAG 64) 64 mg TbEC Take 1 tablet (64 mg total) by mouth 2 (two) times daily. 180 tablet 1    meclizine (ANTIVERT) 25 mg tablet Take 1 tablet (25 mg total) by mouth 3 (three) times daily as needed for Dizziness. 180 tablet 2    metFORMIN (GLUCOPHAGE) 500 MG tablet Take 1 tablet (500 mg total) by mouth 2 (two) times a day. 180 tablet 0    multivitamin-iron-folic acid Tab Take 1 tablet by mouth once daily.      omeprazole (PRILOSEC) 20 MG capsule Take 1 capsule (20 mg total) by mouth 2 (two) times a  "day. 180 capsule 1    pravastatin (PRAVACHOL) 20 MG tablet Take 1 tablet (20 mg total) by mouth nightly. 90 tablet 1    tiZANidine (ZANAFLEX) 2 MG tablet TAKE 1 TABLET BY MOUTH EVERY DAY AT BEDTIME FOR MUSCLE SPASMS (MAY CAUSE DROWSINESS)      triamcinolone acetonide 0.1% (KENALOG) 0.1 % cream Apply topically 2 (two) times daily. APPLY TO AFFECTED AREA TWO TIMES DAILY      [DISCONTINUED] azithromycin (ZITHROMAX Z-IMELDA) 250 MG tablet Take 2 tabs by mouth today then 1 tab daily x 4 days 6 tablet 0    cetirizine-pseudoephedrine 5-120 mg Tb12 Take 1 tablet by mouth once daily. for 10 days 10 tablet 0     No facility-administered encounter medications on file as of 1/20/2022.     Allergies:  Review of patient's allergies indicates:   Allergen Reactions    Ace inhibitors Anaphylaxis    Penicillins Anaphylaxis    Bactrim [sulfamethoxazole-trimethoprim]     Biaxin [clarithromycin]     Ciprofloxacin     Iodinated contrast media     Iodine and iodide containing products Hives    Mobic [meloxicam]     Sulfa (sulfonamide antibiotics) Hives and Rash     Health Maintenance:  Immunization History   Administered Date(s) Administered    COVID-19 MRNA, LN-S PF (MODERNA HALF 0.25 ML DOSE) 12/28/2021    COVID-19, MRNA, LN-S, PF (MODERNA FULL 0.5 ML DOSE) 03/24/2021, 04/21/2021      Health Maintenance   Topic Date Due    Eye Exam  Never done    DEXA SCAN  Never done    TETANUS VACCINE  09/22/2022 (Originally 1/1/1963)    Hepatitis C Screening  12/28/2022 (Originally 1945)    Hemoglobin A1c  03/22/2022    Foot Exam  12/21/2022    Lipid Panel  09/22/2026      Physical Exam      Vital Signs  Pulse: 82  SpO2: 99 %  BP: 118/80  Height and Weight  Height: 5' 4" (162.6 cm)  Weight: 66.2 kg (146 lb)  BSA (Calculated - sq m): 1.73 sq meters  BMI (Calculated): 25  Weight in (lb) to have BMI = 25: 145.3]    Physical Exam     Laboratory:  CBC:  Recent Labs   Lab 06/23/21  0917 06/23/21  0917 09/22/21  0859   WBC 5.10   < > " 4.68   RBC 3.87 L   < > 3.48 L   Hemoglobin 12.2   < > 11.3 L   Hematocrit 36.6 L   < > 32.3 L   Platelet Count 294   < > 283   MCV 94.6   < > 92.8   MCH 31.5 H   < > 32.5 H   MCHC 33.3  --  35.0    < > = values in this interval not displayed.     CMP:  Recent Labs   Lab 09/22/21  0859   Glucose 126 H   Calcium 9.9   Albumin 3.9   Total Protein 7.9   Sodium 129 L   Potassium 3.2 L   CO2 28   Chloride 94 L   BUN 8   Alk Phos 110   ALT 16   AST 9 L   Bilirubin, Total 0.3     LIPIDS:  Recent Labs   Lab 09/22/21  0859   HDL Cholesterol 78 H   Cholesterol 207 H   Triglycerides 68   LDL Calculated 115   Cholesterol/HDL Ratio (Risk Factor) 2.7   Non-     A1C:  Recent Labs   Lab 09/22/21  0859   Hemoglobin A1C 6.1     Assessment/Plan     Yana Oates is a 77 y.o.female with:    1. Otitis media with effusion, bilateral  - cetirizine-pseudoephedrine 5-120 mg Tb12; Take 1 tablet by mouth once daily. for 10 days  Dispense: 10 tablet; Refill: 0     Chronic conditions status updated as per HPI.  Other than changes above, cont current medications and maintain follow up with specialists.  Return to clinic as needed.    Lila Boudreaux, WHIT  [facility]

## 2022-03-11 DIAGNOSIS — Z71.89 COMPLEX CARE COORDINATION: ICD-10-CM

## 2022-03-14 DIAGNOSIS — K21.9 GASTROESOPHAGEAL REFLUX DISEASE, UNSPECIFIED WHETHER ESOPHAGITIS PRESENT: ICD-10-CM

## 2022-03-14 RX ORDER — OMEPRAZOLE 20 MG/1
20 CAPSULE, DELAYED RELEASE ORAL 2 TIMES DAILY
Qty: 180 CAPSULE | Refills: 1 | Status: SHIPPED | OUTPATIENT
Start: 2022-03-14 | End: 2022-03-24 | Stop reason: SDUPTHER

## 2022-03-24 ENCOUNTER — OFFICE VISIT (OUTPATIENT)
Dept: FAMILY MEDICINE | Facility: CLINIC | Age: 77
End: 2022-03-24
Payer: MEDICARE

## 2022-03-24 VITALS
WEIGHT: 144.63 LBS | BODY MASS INDEX: 24.69 KG/M2 | TEMPERATURE: 98 F | DIASTOLIC BLOOD PRESSURE: 88 MMHG | RESPIRATION RATE: 18 BRPM | SYSTOLIC BLOOD PRESSURE: 142 MMHG | HEIGHT: 64 IN | OXYGEN SATURATION: 99 % | HEART RATE: 76 BPM

## 2022-03-24 DIAGNOSIS — E11.9 TYPE 2 DIABETES MELLITUS WITHOUT COMPLICATION, WITHOUT LONG-TERM CURRENT USE OF INSULIN: Primary | ICD-10-CM

## 2022-03-24 DIAGNOSIS — K21.9 GASTROESOPHAGEAL REFLUX DISEASE, UNSPECIFIED WHETHER ESOPHAGITIS PRESENT: ICD-10-CM

## 2022-03-24 DIAGNOSIS — H66.92 OM (OTITIS MEDIA), RECURRENT, LEFT: ICD-10-CM

## 2022-03-24 DIAGNOSIS — D50.9 IRON DEFICIENCY ANEMIA, UNSPECIFIED IRON DEFICIENCY ANEMIA TYPE: ICD-10-CM

## 2022-03-24 DIAGNOSIS — E87.6 HYPOKALEMIA: ICD-10-CM

## 2022-03-24 DIAGNOSIS — I10 ESSENTIAL HYPERTENSION, BENIGN: ICD-10-CM

## 2022-03-24 DIAGNOSIS — F41.9 ANXIETY: ICD-10-CM

## 2022-03-24 LAB
ALBUMIN SERPL BCP-MCNC: 4 G/DL (ref 3.5–5)
ALBUMIN/GLOB SERPL: 1 {RATIO}
ALP SERPL-CCNC: 107 U/L (ref 55–142)
ALT SERPL W P-5'-P-CCNC: 18 U/L (ref 13–56)
ANION GAP SERPL CALCULATED.3IONS-SCNC: 9 MMOL/L (ref 7–16)
AST SERPL W P-5'-P-CCNC: 12 U/L (ref 15–37)
BASOPHILS # BLD AUTO: 0.03 K/UL (ref 0–0.2)
BASOPHILS NFR BLD AUTO: 0.5 % (ref 0–1)
BILIRUB SERPL-MCNC: 0.4 MG/DL (ref 0–1.2)
BUN SERPL-MCNC: 9 MG/DL (ref 7–18)
BUN/CREAT SERPL: 12 (ref 6–20)
CALCIUM SERPL-MCNC: 9.8 MG/DL (ref 8.5–10.1)
CHLORIDE SERPL-SCNC: 99 MMOL/L (ref 98–107)
CHOLEST SERPL-MCNC: 218 MG/DL (ref 0–200)
CHOLEST/HDLC SERPL: 2.7 {RATIO}
CO2 SERPL-SCNC: 30 MMOL/L (ref 21–32)
CREAT SERPL-MCNC: 0.78 MG/DL (ref 0.55–1.02)
DIFFERENTIAL METHOD BLD: ABNORMAL
EOSINOPHIL # BLD AUTO: 0.3 K/UL (ref 0–0.5)
EOSINOPHIL NFR BLD AUTO: 5 % (ref 1–4)
ERYTHROCYTE [DISTWIDTH] IN BLOOD BY AUTOMATED COUNT: 12.8 % (ref 11.5–14.5)
GLOBULIN SER-MCNC: 4.1 G/DL (ref 2–4)
GLUCOSE SERPL-MCNC: 104 MG/DL (ref 74–106)
HCT VFR BLD AUTO: 38.6 % (ref 38–47)
HDLC SERPL-MCNC: 81 MG/DL (ref 40–60)
HGB BLD-MCNC: 12.2 G/DL (ref 12–16)
IMM GRANULOCYTES # BLD AUTO: 0.01 K/UL (ref 0–0.04)
IMM GRANULOCYTES NFR BLD: 0.2 % (ref 0–0.4)
LDLC SERPL CALC-MCNC: 117 MG/DL
LDLC/HDLC SERPL: 1.4 {RATIO}
LYMPHOCYTES # BLD AUTO: 2.35 K/UL (ref 1–4.8)
LYMPHOCYTES NFR BLD AUTO: 39.2 % (ref 27–41)
MCH RBC QN AUTO: 31.4 PG (ref 27–31)
MCHC RBC AUTO-ENTMCNC: 31.6 G/DL (ref 32–36)
MCV RBC AUTO: 99.5 FL (ref 80–96)
MONOCYTES # BLD AUTO: 0.52 K/UL (ref 0–0.8)
MONOCYTES NFR BLD AUTO: 8.7 % (ref 2–6)
MPC BLD CALC-MCNC: 11.8 FL (ref 9.4–12.4)
NEUTROPHILS # BLD AUTO: 2.79 K/UL (ref 1.8–7.7)
NEUTROPHILS NFR BLD AUTO: 46.4 % (ref 53–65)
NONHDLC SERPL-MCNC: 137 MG/DL
NRBC # BLD AUTO: 0 X10E3/UL
NRBC, AUTO (.00): 0 %
PLATELET # BLD AUTO: 282 K/UL (ref 150–400)
POTASSIUM SERPL-SCNC: 3.4 MMOL/L (ref 3.5–5.1)
PROT SERPL-MCNC: 8.1 G/DL (ref 6.4–8.2)
RBC # BLD AUTO: 3.88 M/UL (ref 4.2–5.4)
SODIUM SERPL-SCNC: 135 MMOL/L (ref 136–145)
TRIGL SERPL-MCNC: 98 MG/DL (ref 35–150)
VLDLC SERPL-MCNC: 20 MG/DL
WBC # BLD AUTO: 6 K/UL (ref 4.5–11)

## 2022-03-24 PROCEDURE — 83036 HEMOGLOBIN A1C: ICD-10-PCS | Mod: ,,, | Performed by: CLINICAL MEDICAL LABORATORY

## 2022-03-24 PROCEDURE — 99213 PR OFFICE/OUTPT VISIT, EST, LEVL III, 20-29 MIN: ICD-10-PCS | Mod: ,,, | Performed by: NURSE PRACTITIONER

## 2022-03-24 PROCEDURE — 85025 COMPLETE CBC W/AUTO DIFF WBC: CPT | Mod: ,,, | Performed by: CLINICAL MEDICAL LABORATORY

## 2022-03-24 PROCEDURE — 99213 OFFICE O/P EST LOW 20 MIN: CPT | Mod: ,,, | Performed by: NURSE PRACTITIONER

## 2022-03-24 PROCEDURE — 85025 CBC WITH DIFFERENTIAL: ICD-10-PCS | Mod: ,,, | Performed by: CLINICAL MEDICAL LABORATORY

## 2022-03-24 PROCEDURE — 80053 COMPREHEN METABOLIC PANEL: CPT | Mod: ,,, | Performed by: CLINICAL MEDICAL LABORATORY

## 2022-03-24 PROCEDURE — 80061 LIPID PANEL: ICD-10-PCS | Mod: ,,, | Performed by: CLINICAL MEDICAL LABORATORY

## 2022-03-24 PROCEDURE — 80053 COMPREHENSIVE METABOLIC PANEL: ICD-10-PCS | Mod: ,,, | Performed by: CLINICAL MEDICAL LABORATORY

## 2022-03-24 PROCEDURE — 80061 LIPID PANEL: CPT | Mod: ,,, | Performed by: CLINICAL MEDICAL LABORATORY

## 2022-03-24 PROCEDURE — 83036 HEMOGLOBIN GLYCOSYLATED A1C: CPT | Mod: ,,, | Performed by: CLINICAL MEDICAL LABORATORY

## 2022-03-24 RX ORDER — HYDROCHLOROTHIAZIDE 25 MG/1
25 TABLET ORAL DAILY
Qty: 90 TABLET | Refills: 1 | Status: SHIPPED | OUTPATIENT
Start: 2022-03-24 | End: 2022-06-24 | Stop reason: SDUPTHER

## 2022-03-24 RX ORDER — OMEPRAZOLE 20 MG/1
20 CAPSULE, DELAYED RELEASE ORAL 2 TIMES DAILY
Qty: 180 CAPSULE | Refills: 1 | Status: SHIPPED | OUTPATIENT
Start: 2022-03-24 | End: 2022-06-24 | Stop reason: SDUPTHER

## 2022-03-24 RX ORDER — CLONIDINE HYDROCHLORIDE 0.2 MG/1
TABLET ORAL
Qty: 180 TABLET | Refills: 1 | Status: SHIPPED | OUTPATIENT
Start: 2022-03-24 | End: 2022-09-22 | Stop reason: SDUPTHER

## 2022-03-24 RX ORDER — METFORMIN HYDROCHLORIDE 500 MG/1
500 TABLET ORAL 2 TIMES DAILY
Qty: 180 TABLET | Refills: 1 | Status: SHIPPED | OUTPATIENT
Start: 2022-03-24 | End: 2022-09-22 | Stop reason: SDUPTHER

## 2022-03-24 RX ORDER — ALPRAZOLAM 0.25 MG/1
0.25 TABLET ORAL 2 TIMES DAILY PRN
Qty: 60 TABLET | Refills: 2 | Status: SHIPPED | OUTPATIENT
Start: 2022-03-24 | End: 2022-06-24 | Stop reason: SDUPTHER

## 2022-03-24 RX ORDER — DOXYCYCLINE 100 MG/1
100 CAPSULE ORAL EVERY 12 HOURS
Qty: 20 CAPSULE | Refills: 0 | Status: SHIPPED | OUTPATIENT
Start: 2022-03-24 | End: 2022-09-22

## 2022-03-24 RX ORDER — MECLIZINE HCL 12.5 MG 12.5 MG/1
TABLET ORAL
Qty: 90 TABLET | Refills: 2 | Status: SHIPPED | OUTPATIENT
Start: 2022-03-24 | End: 2022-08-10 | Stop reason: SDUPTHER

## 2022-03-24 RX ORDER — AMLODIPINE BESYLATE 10 MG/1
10 TABLET ORAL DAILY
Qty: 90 TABLET | Refills: 1 | Status: SHIPPED | OUTPATIENT
Start: 2022-03-24 | End: 2022-06-24 | Stop reason: SDUPTHER

## 2022-03-24 RX ORDER — CARVEDILOL 6.25 MG/1
6.25 TABLET ORAL 2 TIMES DAILY WITH MEALS
Qty: 180 TABLET | Refills: 1 | Status: SHIPPED | OUTPATIENT
Start: 2022-03-24 | End: 2022-06-24 | Stop reason: SDUPTHER

## 2022-03-24 RX ORDER — MECLIZINE HCL 12.5 MG 12.5 MG/1
TABLET ORAL
COMMUNITY
Start: 2022-03-17 | End: 2022-03-24 | Stop reason: SDUPTHER

## 2022-03-24 RX ORDER — PRAVASTATIN SODIUM 20 MG/1
20 TABLET ORAL NIGHTLY
Qty: 90 TABLET | Refills: 1 | Status: SHIPPED | OUTPATIENT
Start: 2022-03-24 | End: 2022-06-24 | Stop reason: SDUPTHER

## 2022-03-24 RX ORDER — FERROUS SULFATE 325(65) MG
325 TABLET ORAL DAILY
Qty: 90 TABLET | Refills: 1 | Status: SHIPPED | OUTPATIENT
Start: 2022-03-24 | End: 2022-06-24 | Stop reason: SDUPTHER

## 2022-03-25 LAB
EST. AVERAGE GLUCOSE BLD GHB EST-MCNC: 127 MG/DL
HBA1C MFR BLD HPLC: 6.4 % (ref 4.5–6.6)

## 2022-03-25 RX ORDER — POTASSIUM CHLORIDE 750 MG/1
10 CAPSULE, EXTENDED RELEASE ORAL DAILY
Qty: 30 CAPSULE | Refills: 2 | Status: SHIPPED | OUTPATIENT
Start: 2022-03-25 | End: 2022-06-24

## 2022-03-28 NOTE — PROGRESS NOTES
"   WHIT Etienne   [facility]  [id number] [place] [id number]  [phone]      PATIENT NAME: Yana Oates  : 1945  DATE: 3/24/22  MRN: [id number]      Billing Provider: WHIT Etinene  Level of Service:   Patient PCP Information     Provider PCP Type    [facility]          Reason for Visit / Chief Complaint: Ear Fullness (Would like to have her ears checked for fluid in them.), Dizziness (Has been having vertigo for 4 months.), Hypertension (Here for check up and med refills.), and Diabetes       Update PCP  Update Chief Complaint         History of Present Illness / Problem Focused Workflow     77 year old female presents for complaints of ear fullness, dizziness, medication refills  Reports dizziness has been better since starting antivert  Blood pressure is elevated today  States she has had some head congestion and "allergy problems"    Review of Systems     Review of Systems   Constitutional: Negative for chills, fatigue and fever.   HENT: Negative for congestion, ear pain and sore throat.         Reaction reaction to medication with tongue swelling   Eyes: Negative for visual disturbance.   Respiratory: Negative for cough and shortness of breath.    Cardiovascular: Negative for chest pain and palpitations.   Gastrointestinal: Negative for abdominal pain, diarrhea and nausea.   Musculoskeletal: Positive for arthralgias. Negative for gait problem.   Neurological: Positive for dizziness (for many years). Negative for weakness and headaches.   Psychiatric/Behavioral: Negative for dysphoric mood. The patient is not nervous/anxious.        Medical / Social / Family History     Past Medical History:   Diagnosis Date    Anxiety     Diabetes mellitus, type 2     Essential hypertension, benign     Fatigue     GERD (gastroesophageal reflux disease)     Low back pain     Osteoarthritis     Other long term (current) drug therapy     Strain of " muscle and tendon of back wall of thorax, initial encounter        Past Surgical History:   Procedure Laterality Date    APPENDECTOMY      CHOLECYSTECTOMY      HYSTERECTOMY      partial    LIPOMA RESECTION Left     LUNG LOBECTOMY Right     due to trauma       Social History  Ms.  reports that she has never smoked. She has never used smokeless tobacco. She reports that she does not drink alcohol and does not use drugs.    Family History  Ms.'s family history includes Arthritis in her sister; Asthma in her father; Atrial fibrillation in her sister; Brain cancer in her brother; COPD in her brother, brother, and sister; Cancer in her brother and brother; Cervical cancer in her daughter; Heart disease in her brother and mother; Hypertension in her mother; Lung cancer in her brother; No Known Problems in her brother, brother, daughter, daughter, daughter, daughter, daughter, daughter, sister, sister, and sister; Osteoarthritis in her mother; Thyroid disease in her daughter.    Medications and Allergies     Medications  Outpatient Medications Marked as Taking for the 3/24/22 encounter (Office Visit) with WHIT Etienne   Medication Sig Dispense Refill    blood sugar diagnostic Strp by Misc.(Non-Drug; Combo Route) route. CHECK BLOOD SUGAR ONE TIME DAILY AND AS NEEDED      diclofenac (VOLTAREN) 50 MG EC tablet Take 50 mg by mouth 2 (two) times daily.      diclofenac sodium (VOLTAREN) 1 % Gel Apply 2 g topically 2 (two) times daily.      fluticasone propionate (FLONASE) 50 mcg/actuation nasal spray USE 1 SPRAY IN EACH NOSTRIL 2 TIMES A DAY ((SHAKE WELL)) 48 g 2    multivitamin-iron-folic acid Tab Take 1 tablet by mouth once daily.      triamcinolone acetonide 0.1% (KENALOG) 0.1 % cream Apply topically 2 (two) times daily. APPLY TO AFFECTED AREA TWO TIMES DAILY      [DISCONTINUED] ALPRAZolam (XANAX) 0.25 MG tablet Take 1 tablet (0.25 mg total) by mouth 2 (two) times daily as needed for Anxiety. 60 tablet 2     [DISCONTINUED] amLODIPine (NORVASC) 10 MG tablet Take 1 tablet (10 mg total) by mouth once daily. 90 tablet 1    [DISCONTINUED] carvediloL (COREG) 6.25 MG tablet Take 1 tablet (6.25 mg total) by mouth 2 (two) times daily with meals. 180 tablet 1    [DISCONTINUED] cloNIDine (CATAPRES) 0.2 MG tablet TAKE ONE-HALF TO ONE TAB TWICE DAILY 180 tablet 0    [DISCONTINUED] ferrous sulfate (IRON, FERROUS SULFATE,) 325 mg (65 mg iron) Tab tablet Take 1 tablet (325 mg total) by mouth once daily. 90 tablet 1    [DISCONTINUED] hydroCHLOROthiazide (HYDRODIURIL) 25 MG tablet Take 1 tablet (25 mg total) by mouth once daily. 90 tablet 1    [DISCONTINUED] magnesium chloride (MAG 64) 64 mg TbEC Take 1 tablet (64 mg total) by mouth 2 (two) times daily. 180 tablet 1    [DISCONTINUED] meclizine (ANTIVERT) 12.5 mg tablet TAKE 1 TABLET BY MOUTH 3 TIMES A DAY AS NEEDED FOR DIZZINESS (MAY CAUSE DROWSINESS)      [DISCONTINUED] metFORMIN (GLUCOPHAGE) 500 MG tablet Take 1 tablet (500 mg total) by mouth 2 (two) times a day. 180 tablet 0    [DISCONTINUED] omeprazole (PRILOSEC) 20 MG capsule Take 1 capsule (20 mg total) by mouth 2 (two) times a day. 180 capsule 1    [DISCONTINUED] pravastatin (PRAVACHOL) 20 MG tablet Take 1 tablet (20 mg total) by mouth nightly. 90 tablet 1       Allergies  Review of patient's allergies indicates:   Allergen Reactions    Ace inhibitors Anaphylaxis    Penicillins Anaphylaxis    Bactrim [sulfamethoxazole-trimethoprim]     Biaxin [clarithromycin]     Ciprofloxacin     Iodinated contrast media     Iodine and iodide containing products Hives    Latex     Mobic [meloxicam]     Trimethoprim     Sulfa (sulfonamide antibiotics) Hives and Rash       Physical Examination     Vitals:    03/24/22 0902   BP: (!) 142/88   Pulse:    Resp:    Temp:      Physical Exam  Constitutional:       General: She is not in acute distress.  HENT:      Head: Normocephalic.      Right Ear: Tympanic membrane normal.      Left  Ear: Tympanic membrane normal.      Nose: Nose normal. No congestion or rhinorrhea.      Mouth/Throat:      Mouth: Mucous membranes are moist.      Pharynx: No posterior oropharyngeal erythema.      Comments: No swelling   Eyes:      Extraocular Movements: Extraocular movements intact.   Cardiovascular:      Rate and Rhythm: Normal rate.      Heart sounds: Normal heart sounds.   Pulmonary:      Effort: Pulmonary effort is normal. No respiratory distress.   Abdominal:      General: Bowel sounds are normal.   Musculoskeletal:         General: Normal range of motion.      Cervical back: Normal range of motion.   Skin:     General: Skin is warm.   Neurological:      Mental Status: She is alert and oriented to person, place, and time.   Psychiatric:         Mood and Affect: Mood normal.           Imaging / Labs       Office Visit on 03/24/2022   Component Date Value Ref Range Status    Triglycerides 03/24/2022 98  35 - 150 mg/dL Final    Cholesterol 03/24/2022 218 (A) 0 - 200 mg/dL Final    HDL Cholesterol 03/24/2022 81 (A) 40 - 60 mg/dL Final    Cholesterol/HDL Ratio (Risk Factor) 03/24/2022 2.7   Final    Non-HDL 03/24/2022 137  mg/dL Final    LDL Calculated 03/24/2022 117  mg/dL Final    LDL/HDL 03/24/2022 1.4   Final    VLDL 03/24/2022 20  mg/dL Final    Hemoglobin A1C 03/24/2022 6.4  4.5 - 6.6 % Final    Estimated Average Glucose 03/24/2022 127  mg/dL Final    Sodium 03/24/2022 135 (A) 136 - 145 mmol/L Final    Potassium 03/24/2022 3.4 (A) 3.5 - 5.1 mmol/L Final    Chloride 03/24/2022 99  98 - 107 mmol/L Final    CO2 03/24/2022 30  21 - 32 mmol/L Final    Anion Gap 03/24/2022 9  7 - 16 mmol/L Final    Glucose 03/24/2022 104  74 - 106 mg/dL Final    BUN 03/24/2022 9  7 - 18 mg/dL Final    Creatinine 03/24/2022 0.78  0.55 - 1.02 mg/dL Final    BUN/Creatinine Ratio 03/24/2022 12  6 - 20 Final    Calcium 03/24/2022 9.8  8.5 - 10.1 mg/dL Final    Total Protein 03/24/2022 8.1  6.4 - 8.2 g/dL Final     Albumin 03/24/2022 4.0  3.5 - 5.0 g/dL Final    Globulin 03/24/2022 4.1 (A) 2.0 - 4.0 g/dL Final    A/G Ratio 03/24/2022 1.0   Final    Bilirubin, Total 03/24/2022 0.4  0.0 - 1.2 mg/dL Final    Alk Phos 03/24/2022 107  55 - 142 U/L Final    ALT 03/24/2022 18  13 - 56 U/L Final    AST 03/24/2022 12 (A) 15 - 37 U/L Final    eGFR 03/24/2022 76  >=60 mL/min/1.73m² Final    WBC 03/24/2022 6.00  4.50 - 11.00 K/uL Final    RBC 03/24/2022 3.88 (A) 4.20 - 5.40 M/uL Final    Hemoglobin 03/24/2022 12.2  12.0 - 16.0 g/dL Final    Hematocrit 03/24/2022 38.6  38.0 - 47.0 % Final    MCV 03/24/2022 99.5 (A) 80.0 - 96.0 fL Final    MCH 03/24/2022 31.4 (A) 27.0 - 31.0 pg Final    MCHC 03/24/2022 31.6 (A) 32.0 - 36.0 g/dL Final    RDW 03/24/2022 12.8  11.5 - 14.5 % Final    Platelet Count 03/24/2022 282  150 - 400 K/uL Final    MPV 03/24/2022 11.8  9.4 - 12.4 fL Final    Neutrophils % 03/24/2022 46.4 (A) 53.0 - 65.0 % Final    Lymphocytes % 03/24/2022 39.2  27.0 - 41.0 % Final    Monocytes % 03/24/2022 8.7 (A) 2.0 - 6.0 % Final    Eosinophils % 03/24/2022 5.0 (A) 1.0 - 4.0 % Final    Basophils % 03/24/2022 0.5  0.0 - 1.0 % Final    Immature Granulocytes % 03/24/2022 0.2  0.0 - 0.4 % Final    nRBC, Auto 03/24/2022 0.0  <=0.0 % Final    Neutrophils, Abs 03/24/2022 2.79  1.80 - 7.70 K/uL Final    Lymphocytes, Absolute 03/24/2022 2.35  1.00 - 4.80 K/uL Final    Monocytes, Absolute 03/24/2022 0.52  0.00 - 0.80 K/uL Final    Eosinophils, Absolute 03/24/2022 0.30  0.00 - 0.50 K/uL Final    Basophils, Absolute 03/24/2022 0.03  0.00 - 0.20 K/uL Final    Immature Granulocytes, Absolute 03/24/2022 0.01  0.00 - 0.04 K/uL Final    nRBC, Absolute 03/24/2022 0.00  <=0.00 x10e3/uL Final    Diff Type 03/24/2022 Auto   Final       Assessment and Plan (including Health Maintenance)      Problem List  Smart Sets  Document Outside HM   :    Health Maintenance Due   Topic Date Due    Diabetes Urine Screening  Never done     Eye Exam  Never done    DEXA Scan  Never done       Problem List Items Addressed This Visit        Psychiatric    Anxiety    Relevant Medications    ALPRAZolam (XANAX) 0.25 MG tablet       Cardiac/Vascular    Essential hypertension, benign    Relevant Medications    amLODIPine (NORVASC) 10 MG tablet    carvediloL (COREG) 6.25 MG tablet    Other Relevant Orders    Lipid Panel (Completed)    CBC Auto Differential (Completed)    Comprehensive Metabolic Panel (Completed)       Oncology    Iron deficiency anemia    Relevant Medications    ferrous sulfate (IRON, FERROUS SULFATE,) 325 mg (65 mg iron) Tab tablet    Other Relevant Orders    CBC Auto Differential (Completed)       Endocrine    Diabetes mellitus, type 2 - Primary    Relevant Medications    metFORMIN (GLUCOPHAGE) 500 MG tablet    Other Relevant Orders    Lipid Panel (Completed)    Hemoglobin A1C (Completed)    CBC Auto Differential (Completed)    Comprehensive Metabolic Panel (Completed)       GI    GERD (gastroesophageal reflux disease)    Relevant Medications    omeprazole (PRILOSEC) 20 MG capsule    Other Relevant Orders    Lipid Panel (Completed)    CBC Auto Differential (Completed)      Other Visit Diagnoses     OM (otitis media), recurrent, left        Relevant Medications    doxycycline (VIBRAMYCIN) 100 MG Cap    Hypokalemia        Relevant Medications    potassium chloride (MICRO-K) 10 MEQ CpSR        Treat for right OM today. Refill current medication  Labs done today. Follow up 6 months  Pt voices understanding and agreement    Health Maintenance Topics with due status: Not Due       Topic Last Completion Date    Foot Exam 12/21/2021    Lipid Panel 03/24/2022    Hemoglobin A1c 03/24/2022       Future Appointments   Date Time Provider Department Center   6/24/2022  8:45 AM WHIT [facility] NINA [facility]   1[facility]2023 10[facility]          Signature:  WHIT Etienne  [facility]  [facility]  [address] [address]  [phone]    Date of encounter: 3/24/22

## 2022-06-13 LAB
LEFT EYE DM RETINOPATHY: NORMAL
LEFT EYE DM RETINOPATHY: NORMAL
RIGHT EYE DM RETINOPATHY: NORMAL
RIGHT EYE DM RETINOPATHY: NORMAL

## 2022-06-24 ENCOUNTER — OFFICE VISIT (OUTPATIENT)
Dept: FAMILY MEDICINE | Facility: CLINIC | Age: 77
End: 2022-06-24
Payer: MEDICARE

## 2022-06-24 VITALS
HEIGHT: 64 IN | OXYGEN SATURATION: 98 % | TEMPERATURE: 98 F | WEIGHT: 143.19 LBS | RESPIRATION RATE: 18 BRPM | DIASTOLIC BLOOD PRESSURE: 70 MMHG | BODY MASS INDEX: 24.45 KG/M2 | HEART RATE: 78 BPM | SYSTOLIC BLOOD PRESSURE: 110 MMHG

## 2022-06-24 DIAGNOSIS — E87.6 HYPOKALEMIA: ICD-10-CM

## 2022-06-24 DIAGNOSIS — D50.9 IRON DEFICIENCY ANEMIA, UNSPECIFIED IRON DEFICIENCY ANEMIA TYPE: ICD-10-CM

## 2022-06-24 DIAGNOSIS — E78.5 HYPERLIPIDEMIA, UNSPECIFIED HYPERLIPIDEMIA TYPE: ICD-10-CM

## 2022-06-24 DIAGNOSIS — K21.9 GASTROESOPHAGEAL REFLUX DISEASE, UNSPECIFIED WHETHER ESOPHAGITIS PRESENT: ICD-10-CM

## 2022-06-24 DIAGNOSIS — R53.83 FATIGUE, UNSPECIFIED TYPE: ICD-10-CM

## 2022-06-24 DIAGNOSIS — E11.9 TYPE 2 DIABETES MELLITUS WITHOUT COMPLICATION, WITHOUT LONG-TERM CURRENT USE OF INSULIN: ICD-10-CM

## 2022-06-24 DIAGNOSIS — Z79.899 ENCOUNTER FOR LONG-TERM (CURRENT) USE OF MEDICATIONS: ICD-10-CM

## 2022-06-24 DIAGNOSIS — T78.40XD ALLERGY, SUBSEQUENT ENCOUNTER: ICD-10-CM

## 2022-06-24 DIAGNOSIS — Z79.899 OTHER LONG TERM (CURRENT) DRUG THERAPY: ICD-10-CM

## 2022-06-24 DIAGNOSIS — I10 ESSENTIAL HYPERTENSION, BENIGN: Primary | ICD-10-CM

## 2022-06-24 DIAGNOSIS — F41.9 ANXIETY: ICD-10-CM

## 2022-06-24 DIAGNOSIS — Z13.820 SCREENING FOR OSTEOPOROSIS: ICD-10-CM

## 2022-06-24 DIAGNOSIS — N95.9 UNSPECIFIED MENOPAUSAL AND PERIMENOPAUSAL DISORDER: ICD-10-CM

## 2022-06-24 LAB
25(OH)D3 SERPL-MCNC: 47.1 NG/ML
ALBUMIN SERPL BCP-MCNC: 3.7 G/DL (ref 3.5–5)
ALBUMIN/GLOB SERPL: 0.9 {RATIO}
ALP SERPL-CCNC: 115 U/L (ref 55–142)
ALT SERPL W P-5'-P-CCNC: 14 U/L (ref 13–56)
ANION GAP SERPL CALCULATED.3IONS-SCNC: 11 MMOL/L (ref 7–16)
AST SERPL W P-5'-P-CCNC: 8 U/L (ref 15–37)
BILIRUB SERPL-MCNC: 0.4 MG/DL (ref 0–1.2)
BUN SERPL-MCNC: 8 MG/DL (ref 7–18)
BUN/CREAT SERPL: 11 (ref 6–20)
CALCIUM SERPL-MCNC: 9.9 MG/DL (ref 8.5–10.1)
CHLORIDE SERPL-SCNC: 99 MMOL/L (ref 98–107)
CO2 SERPL-SCNC: 32 MMOL/L (ref 21–32)
CREAT SERPL-MCNC: 0.73 MG/DL (ref 0.55–1.02)
CREAT UR-MCNC: 50 MG/DL (ref 28–219)
GLOBULIN SER-MCNC: 4.1 G/DL (ref 2–4)
GLUCOSE SERPL-MCNC: 105 MG/DL (ref 74–106)
MICROALBUMIN UR-MCNC: 1.6 MG/DL (ref 0–2.8)
MICROALBUMIN/CREAT RATIO PNL UR: 32 MG/G (ref 0–30)
POTASSIUM SERPL-SCNC: 3.6 MMOL/L (ref 3.5–5.1)
PROT SERPL-MCNC: 7.8 G/DL (ref 6.4–8.2)
SODIUM SERPL-SCNC: 138 MMOL/L (ref 136–145)

## 2022-06-24 PROCEDURE — 82043 MICROALBUMIN / CREATININE RATIO URINE: ICD-10-PCS | Mod: ,,, | Performed by: CLINICAL MEDICAL LABORATORY

## 2022-06-24 PROCEDURE — 82306 VITAMIN D 25 HYDROXY: CPT | Mod: ,,, | Performed by: CLINICAL MEDICAL LABORATORY

## 2022-06-24 PROCEDURE — 82570 ASSAY OF URINE CREATININE: CPT | Mod: ,,, | Performed by: CLINICAL MEDICAL LABORATORY

## 2022-06-24 PROCEDURE — 99214 OFFICE O/P EST MOD 30 MIN: CPT | Mod: ,,, | Performed by: NURSE PRACTITIONER

## 2022-06-24 PROCEDURE — 82043 UR ALBUMIN QUANTITATIVE: CPT | Mod: ,,, | Performed by: CLINICAL MEDICAL LABORATORY

## 2022-06-24 PROCEDURE — 82570 MICROALBUMIN / CREATININE RATIO URINE: ICD-10-PCS | Mod: ,,, | Performed by: CLINICAL MEDICAL LABORATORY

## 2022-06-24 PROCEDURE — 82306 VITAMIN D: ICD-10-PCS | Mod: ,,, | Performed by: CLINICAL MEDICAL LABORATORY

## 2022-06-24 PROCEDURE — 80053 COMPREHEN METABOLIC PANEL: CPT | Mod: ,,, | Performed by: CLINICAL MEDICAL LABORATORY

## 2022-06-24 PROCEDURE — 80053 COMPREHENSIVE METABOLIC PANEL: ICD-10-PCS | Mod: ,,, | Performed by: CLINICAL MEDICAL LABORATORY

## 2022-06-24 PROCEDURE — 99214 PR OFFICE/OUTPT VISIT, EST, LEVL IV, 30-39 MIN: ICD-10-PCS | Mod: ,,, | Performed by: NURSE PRACTITIONER

## 2022-06-24 RX ORDER — POTASSIUM CHLORIDE 20 MEQ/1
20 TABLET, EXTENDED RELEASE ORAL 2 TIMES DAILY
COMMUNITY
Start: 2022-06-06 | End: 2022-06-24

## 2022-06-24 RX ORDER — TRAMADOL HYDROCHLORIDE 50 MG/1
50 TABLET ORAL EVERY 6 HOURS PRN
COMMUNITY
Start: 2022-06-06 | End: 2023-05-26

## 2022-06-24 RX ORDER — ALPRAZOLAM 0.25 MG/1
0.25 TABLET ORAL 2 TIMES DAILY PRN
Qty: 60 TABLET | Refills: 2 | Status: SHIPPED | OUTPATIENT
Start: 2022-06-24 | End: 2022-09-22 | Stop reason: SDUPTHER

## 2022-06-24 RX ORDER — FERROUS SULFATE 325(65) MG
325 TABLET ORAL DAILY
Qty: 90 TABLET | Refills: 1 | Status: SHIPPED | OUTPATIENT
Start: 2022-06-24 | End: 2022-09-22 | Stop reason: SDUPTHER

## 2022-06-24 RX ORDER — FLUTICASONE PROPIONATE 50 MCG
SPRAY, SUSPENSION (ML) NASAL
Qty: 48 G | Refills: 2 | Status: SHIPPED | OUTPATIENT
Start: 2022-06-24 | End: 2022-11-15 | Stop reason: SDUPTHER

## 2022-06-24 RX ORDER — PRAVASTATIN SODIUM 20 MG/1
20 TABLET ORAL NIGHTLY
Qty: 90 TABLET | Refills: 1 | Status: SHIPPED | OUTPATIENT
Start: 2022-06-24 | End: 2022-09-22

## 2022-06-24 RX ORDER — CARVEDILOL 6.25 MG/1
6.25 TABLET ORAL 2 TIMES DAILY WITH MEALS
Qty: 180 TABLET | Refills: 1 | Status: SHIPPED | OUTPATIENT
Start: 2022-06-24 | End: 2022-09-22 | Stop reason: SDUPTHER

## 2022-06-24 RX ORDER — OMEPRAZOLE 20 MG/1
20 CAPSULE, DELAYED RELEASE ORAL 2 TIMES DAILY
Qty: 180 CAPSULE | Refills: 1 | Status: SHIPPED | OUTPATIENT
Start: 2022-06-24 | End: 2022-09-22 | Stop reason: SDUPTHER

## 2022-06-24 RX ORDER — AMLODIPINE BESYLATE 10 MG/1
10 TABLET ORAL DAILY
Qty: 90 TABLET | Refills: 1 | Status: SHIPPED | OUTPATIENT
Start: 2022-06-24 | End: 2022-09-22 | Stop reason: SDUPTHER

## 2022-06-24 RX ORDER — HYDROCHLOROTHIAZIDE 25 MG/1
25 TABLET ORAL DAILY
Qty: 90 TABLET | Refills: 1 | Status: SHIPPED | OUTPATIENT
Start: 2022-06-24 | End: 2022-09-22 | Stop reason: SDUPTHER

## 2022-06-24 NOTE — PROGRESS NOTES
"   WHIT Casper   [facility]  [address]  [place] [address]     PATIENT NAME: Yana Oates  : 1945  DATE: 22  MRN: [id number]      Billing Provider: WHIT Casper  Level of Service:   Patient PCP Information     Provider PCP Type    WHIT Casper General          Reason for Visit / Chief Complaint: Fatigue ("Need something for pick  up and go do not eat that much" ) and Follow-up ("Need refill on my medicine")       Update PCP  Update Chief Complaint         History of Present Illness / Problem Focused Workflow     Yana Oates presents to the clinic for refill on routine medication. Hx of hypokalemia and has not been able to take potassium pills. Has noticed increase in fatigue.  Hx HTN, DM, anxiety, osteoarthritis and GERD      Review of Systems     Review of Systems   Constitutional: Positive for fatigue. Negative for activity change, appetite change and unexpected weight change.   Eyes: Negative for visual disturbance.   Respiratory: Negative for cough, chest tightness and shortness of breath.    Cardiovascular: Negative for chest pain, palpitations and leg swelling.   Gastrointestinal: Negative for abdominal pain, blood in stool, change in bowel habit, diarrhea, nausea, vomiting, reflux and change in bowel habit.   Endocrine: Negative for cold intolerance, heat intolerance, polydipsia, polyphagia and polyuria.   Genitourinary: Negative for difficulty urinating, frequency and pelvic pain.   Neurological: Negative for dizziness, weakness and headaches.   Psychiatric/Behavioral: Negative for sleep disturbance.        Medical / Social / Family History     Past Medical History:   Diagnosis Date    Anxiety     Diabetes mellitus, type 2     Essential hypertension, benign     Fatigue     GERD (gastroesophageal reflux disease)     Low back pain     Osteoarthritis     Other long term (current) drug therapy     Strain of muscle and tendon of back wall of " thorax, initial encounter        Past Surgical History:   Procedure Laterality Date    APPENDECTOMY      CHOLECYSTECTOMY      HYSTERECTOMY      partial    LIPOMA RESECTION Left     LUNG LOBECTOMY Right     due to trauma       Social History  Ms.  reports that she has never smoked. She has never used smokeless tobacco. She reports that she does not drink alcohol and does not use drugs.    Family History  Ms.'s family history includes Arthritis in her sister; Asthma in her father; Atrial fibrillation in her sister; Brain cancer in her brother; COPD in her brother, brother, and sister; Cancer in her brother and brother; Cervical cancer in her daughter; Heart disease in her brother and mother; Hypertension in her mother; Lung cancer in her brother; No Known Problems in her brother, brother, daughter, daughter, daughter, daughter, daughter, daughter, sister, sister, and sister; Osteoarthritis in her mother; Thyroid disease in her daughter.    Medications and Allergies     Medications  Outpatient Medications Marked as Taking for the 6/24/22 encounter (Office Visit) with WHIT Ramey   Medication Sig Dispense Refill    blood sugar diagnostic Strp by Misc.(Non-Drug; Combo Route) route. CHECK BLOOD SUGAR ONE TIME DAILY AND AS NEEDED      cloNIDine (CATAPRES) 0.2 MG tablet TAKE ONE-HALF TO ONE TAB TWICE DAILY 180 tablet 1    diclofenac (VOLTAREN) 50 MG EC tablet Take 50 mg by mouth 2 (two) times daily.      diclofenac sodium (VOLTAREN) 1 % Gel Apply 2 g topically 2 (two) times daily.      magnesium chloride (MAG 64) 64 mg TbEC Take 1 tablet (64 mg total) by mouth 2 (two) times daily. 180 tablet 1    meclizine (ANTIVERT) 12.5 mg tablet TAKE 1 TABLET BY MOUTH 3 TIMES A DAY AS NEEDED FOR DIZZINESS (MAY CAUSE DROWSINESS) 90 tablet 2    metFORMIN (GLUCOPHAGE) 500 MG tablet Take 1 tablet (500 mg total) by mouth 2 (two) times a day. 180 tablet 1    [DISCONTINUED] ALPRAZolam (XANAX) 0.25 MG tablet Take 1 tablet  "(0.25 mg total) by mouth 2 (two) times daily as needed for Anxiety. 60 tablet 2    [DISCONTINUED] amLODIPine (NORVASC) 10 MG tablet Take 1 tablet (10 mg total) by mouth once daily. 90 tablet 1    [DISCONTINUED] carvediloL (COREG) 6.25 MG tablet Take 1 tablet (6.25 mg total) by mouth 2 (two) times daily with meals. 180 tablet 1    [DISCONTINUED] ferrous sulfate (IRON, FERROUS SULFATE,) 325 mg (65 mg iron) Tab tablet Take 1 tablet (325 mg total) by mouth once daily. 90 tablet 1    [DISCONTINUED] fluticasone propionate (FLONASE) 50 mcg/actuation nasal spray USE 1 SPRAY IN EACH NOSTRIL 2 TIMES A DAY ((SHAKE WELL)) 48 g 2    [DISCONTINUED] hydroCHLOROthiazide (HYDRODIURIL) 25 MG tablet Take 1 tablet (25 mg total) by mouth once daily. 90 tablet 1    [DISCONTINUED] omeprazole (PRILOSEC) 20 MG capsule Take 1 capsule (20 mg total) by mouth 2 (two) times a day. 180 capsule 1    [DISCONTINUED] pravastatin (PRAVACHOL) 20 MG tablet Take 1 tablet (20 mg total) by mouth nightly. 90 tablet 1       Allergies  Review of patient's allergies indicates:   Allergen Reactions    Ace inhibitors Anaphylaxis    Penicillins Anaphylaxis    Bactrim [sulfamethoxazole-trimethoprim]     Biaxin [clarithromycin]     Ciprofloxacin     Iodinated contrast media     Iodine and iodide containing products Hives    Latex     Mobic [meloxicam]     Trimethoprim     Sulfa (sulfonamide antibiotics) Hives and Rash       Physical Examination     Vitals:    06/24/22 0912   BP: 110/70   Pulse: 78   Resp: 18   Temp: 98.1 °F (36.7 °C)   SpO2: 98%   Weight: 65 kg (143 lb 3.2 oz)   Height: 5' 4" (1.626 m)      Physical Exam  Constitutional:       General: She is not in acute distress.     Appearance: Normal appearance.   HENT:      Nose: No congestion.      Mouth/Throat:      Mouth: Mucous membranes are moist.   Eyes:      General: No scleral icterus.     Extraocular Movements: Extraocular movements intact.      Conjunctiva/sclera: Conjunctivae " normal.   Neck:      Thyroid: No thyromegaly.      Vascular: No carotid bruit.   Cardiovascular:      Rate and Rhythm: Normal rate and regular rhythm.      Pulses:           Carotid pulses are 3+ on the right side and 3+ on the left side.       Posterior tibial pulses are 2+ on the right side and 2+ on the left side.      Heart sounds: Normal heart sounds. No murmur heard.  Pulmonary:      Effort: Pulmonary effort is normal. No accessory muscle usage or respiratory distress.      Breath sounds: Normal breath sounds. No wheezing, rhonchi or rales.   Abdominal:      General: Bowel sounds are normal. There is no distension.      Palpations: Abdomen is soft.      Tenderness: There is no abdominal tenderness.   Musculoskeletal:         General: Normal range of motion.      Cervical back: Neck supple.      Right lower leg: No edema.      Left lower leg: No edema.   Skin:     General: Skin is warm and dry.      Capillary Refill: Capillary refill takes 2 to 3 seconds.      Coloration: Skin is not jaundiced or pale.   Neurological:      Mental Status: She is alert and oriented to person, place, and time.      Motor: No weakness or tremor.      Gait: Gait is intact.   Psychiatric:         Attention and Perception: Attention normal.         Mood and Affect: Mood normal.         Behavior: Behavior normal. Behavior is cooperative.         Thought Content: Thought content normal. Thought content does not include suicidal ideation.          Assessment and Plan (including Health Maintenance)      Problem List  Smart Sets  Document Outside HM   :  CMP  Sodium   Date Value Ref Range Status   03/24/2022 135 (L) 136 - 145 mmol/L Final     Potassium   Date Value Ref Range Status   03/24/2022 3.4 (L) 3.5 - 5.1 mmol/L Final     Chloride   Date Value Ref Range Status   03/24/2022 99 98 - 107 mmol/L Final     CO2   Date Value Ref Range Status   03/24/2022 30 21 - 32 mmol/L Final     Glucose   Date Value Ref Range Status   03/24/2022 104 74  - 106 mg/dL Final     BUN   Date Value Ref Range Status   03/24/2022 9 7 - 18 mg/dL Final     Creatinine   Date Value Ref Range Status   03/24/2022 0.78 0.55 - 1.02 mg/dL Final     Calcium   Date Value Ref Range Status   03/24/2022 9.8 8.5 - 10.1 mg/dL Final     Total Protein   Date Value Ref Range Status   03/24/2022 8.1 6.4 - 8.2 g/dL Final     Albumin   Date Value Ref Range Status   03/24/2022 4.0 3.5 - 5.0 g/dL Final     Bilirubin, Total   Date Value Ref Range Status   03/24/2022 0.4 0.0 - 1.2 mg/dL Final     Alk Phos   Date Value Ref Range Status   03/24/2022 107 55 - 142 U/L Final     AST   Date Value Ref Range Status   03/24/2022 12 (L) 15 - 37 U/L Final     ALT   Date Value Ref Range Status   03/24/2022 18 13 - 56 U/L Final     Anion Gap   Date Value Ref Range Status   03/24/2022 9 7 - 16 mmol/L Final     eGFR    Date Value Ref Range Status   09/22/2021 88 >=60 mL/min/1.73m² Final     eGFR   Date Value Ref Range Status   03/24/2022 76 >=60 mL/min/1.73m² Final     Lab Results   Component Value Date    HGBA1C 6.4 03/24/2022     Lab Results   Component Value Date    CHOL 218 (H) 03/24/2022    CHOL 207 (H) 09/22/2021     Lab Results   Component Value Date    HDL 81 (H) 03/24/2022    HDL 78 (H) 09/22/2021     Lab Results   Component Value Date    LDLCALC 117 03/24/2022    LDLCALC 115 09/22/2021     Lab Results   Component Value Date    TRIG 98 03/24/2022    TRIG 68 09/22/2021     Lab Results   Component Value Date    CHOLHDL 2.7 03/24/2022    CHOLHDL 2.7 09/22/2021               Health Maintenance Due   Topic Date Due    Diabetes Urine Screening  Never done    DEXA Scan  Never done    COVID-19 Vaccine (4 - Booster for Moderna series) 04/28/2022       Problem List Items Addressed This Visit        Psychiatric    Anxiety    Relevant Medications    ALPRAZolam (XANAX) 0.25 MG tablet       Cardiac/Vascular    Essential hypertension, benign - Primary    Relevant Medications    carvediloL (COREG) 6.25  MG tablet    hydroCHLOROthiazide (HYDRODIURIL) 25 MG tablet    amLODIPine (NORVASC) 10 MG tablet    Other Relevant Orders    Comprehensive Metabolic Panel       Oncology    Iron deficiency anemia    Relevant Medications    ferrous sulfate (IRON, FERROUS SULFATE,) 325 mg (65 mg iron) Tab tablet       Endocrine    Diabetes mellitus, type 2    Relevant Orders    Microalbumin/Creatinine Ratio, Urine       GI    GERD (gastroesophageal reflux disease)    Relevant Medications    omeprazole (PRILOSEC) 20 MG capsule      Other Visit Diagnoses     Hypokalemia        Liquid potassium ordered and hopefully this will help and she can tolerate it.    Relevant Medications    potassium chloride 1 mEq/ml Liqd liquid (PEDS)    Fatigue, unspecified type        Relevant Orders    Vitamin D    Encounter for long-term (current) use of medications        Relevant Orders    POCT Urine Drug Screen Presump    Allergy, subsequent encounter        Relevant Medications    fluticasone propionate (FLONASE) 50 mcg/actuation nasal spray    Hyperlipidemia, unspecified hyperlipidemia type        Fasting lipids ordered. Continue current medication and LFLC diet. LDL goal < 70    Relevant Medications    pravastatin (PRAVACHOL) 20 MG tablet    Screening for osteoporosis        DEXA scan ordered    Relevant Orders    DXA Bone Density Spine And Hip    Other long term (current) drug therapy         Relevant Orders    Vitamin D    Unspecified menopausal and perimenopausal disorder         Relevant Orders    DXA Bone Density Spine And Hip        Essential hypertension, benign  Comments:  Will check CMP and continue current medication and low sodium diet.  Orders:  -     carvediloL (COREG) 6.25 MG tablet; Take 1 tablet (6.25 mg total) by mouth 2 (two) times daily with meals.  Dispense: 180 tablet; Refill: 1  -     hydroCHLOROthiazide (HYDRODIURIL) 25 MG tablet; Take 1 tablet (25 mg total) by mouth once daily.  Dispense: 90 tablet; Refill: 1  -     amLODIPine  (NORVASC) 10 MG tablet; Take 1 tablet (10 mg total) by mouth once daily.  Dispense: 90 tablet; Refill: 1  -     Comprehensive Metabolic Panel; Future; Expected date: 06/24/2022    Anxiety  Comments:  Continue current medication. Denies diversion,  good, UDS consistent, no signs of aberrant behavior noted  Orders:  -     ALPRAZolam (XANAX) 0.25 MG tablet; Take 1 tablet (0.25 mg total) by mouth 2 (two) times daily as needed for Anxiety.  Dispense: 60 tablet; Refill: 2    Iron deficiency anemia, unspecified iron deficiency anemia type  -     ferrous sulfate (IRON, FERROUS SULFATE,) 325 mg (65 mg iron) Tab tablet; Take 1 tablet (325 mg total) by mouth once daily.  Dispense: 90 tablet; Refill: 1    Gastroesophageal reflux disease, unspecified whether esophagitis present  -     omeprazole (PRILOSEC) 20 MG capsule; Take 1 capsule (20 mg total) by mouth 2 (two) times a day.  Dispense: 180 capsule; Refill: 1    Type 2 diabetes mellitus without complication, without long-term current use of insulin  Comments:  Hgb A1c ordered; continue current medication and diet. Hgb A1c goal < 7.0  Orders:  -     Microalbumin/Creatinine Ratio, Urine    Hypokalemia  Comments:  Liquid potassium ordered and hopefully this will help and she can tolerate it.  Orders:  -     potassium chloride 1 mEq/ml Liqd liquid (PEDS); Take 20 mLs (20 mEq total) by mouth 2 (two) times daily.  Dispense: 1200 mL; Refill: 2    Fatigue, unspecified type  -     Vitamin D; Future; Expected date: 06/24/2022    Encounter for long-term (current) use of medications  -     POCT Urine Drug Screen Presump    Allergy, subsequent encounter  -     fluticasone propionate (FLONASE) 50 mcg/actuation nasal spray; USE 1 SPRAY IN EACH NOSTRIL 2 TIMES A DAY ((SHAKE WELL))  Dispense: 48 g; Refill: 2    Hyperlipidemia, unspecified hyperlipidemia type  Comments:  Fasting lipids ordered. Continue current medication and LFLC diet. LDL goal < 70  Orders:  -     pravastatin (PRAVACHOL)  20 MG tablet; Take 1 tablet (20 mg total) by mouth nightly.  Dispense: 90 tablet; Refill: 1    Screening for osteoporosis  Comments:  DEXA scan ordered  Orders:  -     DXA Bone Density Spine And Hip; Future; Expected date: 06/24/2022    Other long term (current) drug therapy   -     Vitamin D; Future; Expected date: 06/24/2022    Unspecified menopausal and perimenopausal disorder   -     DXA Bone Density Spine And Hip; Future; Expected date: 06/24/2022       Health Maintenance Topics with due status: Not Due       Topic Last Completion Date    Foot Exam 12/21/2021    Lipid Panel 03/24/2022    Hemoglobin A1c 03/24/2022       Procedures     Future Appointments   Date Time Provider Department Center   7/28/2022 10:30 AM [facility] XR4 DEXA [facility]   1/3/2023 10:00 AM [facility] NURSE [facility] NINA Klein        Follow up in about 3 months (around 9/24/2022).     Signature:  WHIT Casper    Date of encounter: 6/24/22

## 2022-08-10 RX ORDER — MECLIZINE HCL 12.5 MG 12.5 MG/1
TABLET ORAL
Qty: 90 TABLET | Refills: 2 | Status: SHIPPED | OUTPATIENT
Start: 2022-08-10 | End: 2022-09-22 | Stop reason: SDUPTHER

## 2022-09-22 ENCOUNTER — OFFICE VISIT (OUTPATIENT)
Dept: FAMILY MEDICINE | Facility: CLINIC | Age: 77
End: 2022-09-22
Payer: MEDICARE

## 2022-09-22 VITALS
BODY MASS INDEX: 24.41 KG/M2 | RESPIRATION RATE: 18 BRPM | HEART RATE: 100 BPM | OXYGEN SATURATION: 99 % | WEIGHT: 143 LBS | TEMPERATURE: 98 F | SYSTOLIC BLOOD PRESSURE: 138 MMHG | DIASTOLIC BLOOD PRESSURE: 84 MMHG | HEIGHT: 64 IN

## 2022-09-22 DIAGNOSIS — D50.9 IRON DEFICIENCY ANEMIA, UNSPECIFIED IRON DEFICIENCY ANEMIA TYPE: ICD-10-CM

## 2022-09-22 DIAGNOSIS — Z79.899 OTHER LONG TERM (CURRENT) DRUG THERAPY: ICD-10-CM

## 2022-09-22 DIAGNOSIS — I10 ESSENTIAL HYPERTENSION, BENIGN: ICD-10-CM

## 2022-09-22 DIAGNOSIS — E11.9 TYPE 2 DIABETES MELLITUS WITHOUT COMPLICATION, WITHOUT LONG-TERM CURRENT USE OF INSULIN: ICD-10-CM

## 2022-09-22 DIAGNOSIS — E11.41 DIABETIC MONONEUROPATHY ASSOCIATED WITH TYPE 2 DIABETES MELLITUS: Primary | ICD-10-CM

## 2022-09-22 DIAGNOSIS — K21.9 GASTROESOPHAGEAL REFLUX DISEASE, UNSPECIFIED WHETHER ESOPHAGITIS PRESENT: ICD-10-CM

## 2022-09-22 DIAGNOSIS — F41.9 ANXIETY: ICD-10-CM

## 2022-09-22 DIAGNOSIS — Z79.899 LONG-TERM USE OF HIGH-RISK MEDICATION: ICD-10-CM

## 2022-09-22 LAB
ALBUMIN SERPL BCP-MCNC: 4.3 G/DL (ref 3.5–5)
ALBUMIN/GLOB SERPL: 1 {RATIO}
ALP SERPL-CCNC: 123 U/L (ref 55–142)
ALT SERPL W P-5'-P-CCNC: 17 U/L (ref 13–56)
ANION GAP SERPL CALCULATED.3IONS-SCNC: 10 MMOL/L (ref 7–16)
AST SERPL W P-5'-P-CCNC: 8 U/L (ref 15–37)
BASOPHILS # BLD AUTO: 0.02 K/UL (ref 0–0.2)
BASOPHILS NFR BLD AUTO: 0.3 % (ref 0–1)
BILIRUB SERPL-MCNC: 0.4 MG/DL (ref ?–1.2)
BUN SERPL-MCNC: 9 MG/DL (ref 7–18)
BUN/CREAT SERPL: 11 (ref 6–20)
CALCIUM SERPL-MCNC: 10 MG/DL (ref 8.5–10.1)
CHLORIDE SERPL-SCNC: 97 MMOL/L (ref 98–107)
CHOLEST SERPL-MCNC: 221 MG/DL (ref 0–200)
CHOLEST/HDLC SERPL: 2.8 {RATIO}
CO2 SERPL-SCNC: 32 MMOL/L (ref 21–32)
CREAT SERPL-MCNC: 0.79 MG/DL (ref 0.55–1.02)
DIFFERENTIAL METHOD BLD: ABNORMAL
EGFR (NO RACE VARIABLE) (RUSH/TITUS): 76 ML/MIN/1.73M²
EOSINOPHIL # BLD AUTO: 0.36 K/UL (ref 0–0.5)
EOSINOPHIL NFR BLD AUTO: 6.1 % (ref 1–4)
ERYTHROCYTE [DISTWIDTH] IN BLOOD BY AUTOMATED COUNT: 12.4 % (ref 11.5–14.5)
EST. AVERAGE GLUCOSE BLD GHB EST-MCNC: 130 MG/DL
GLOBULIN SER-MCNC: 4.4 G/DL (ref 2–4)
GLUCOSE SERPL-MCNC: 102 MG/DL (ref 74–106)
HBA1C MFR BLD HPLC: 6.5 % (ref 4.5–6.6)
HCT VFR BLD AUTO: 38.6 % (ref 38–47)
HDLC SERPL-MCNC: 78 MG/DL (ref 40–60)
HGB BLD-MCNC: 12.7 G/DL (ref 12–16)
IMM GRANULOCYTES # BLD AUTO: 0.01 K/UL (ref 0–0.04)
IMM GRANULOCYTES NFR BLD: 0.2 % (ref 0–0.4)
LDLC SERPL CALC-MCNC: 119 MG/DL
LDLC/HDLC SERPL: 1.5 {RATIO}
LYMPHOCYTES # BLD AUTO: 2.73 K/UL (ref 1–4.8)
LYMPHOCYTES NFR BLD AUTO: 46 % (ref 27–41)
MCH RBC QN AUTO: 32.2 PG (ref 27–31)
MCHC RBC AUTO-ENTMCNC: 32.9 G/DL (ref 32–36)
MCV RBC AUTO: 97.7 FL (ref 80–96)
MONOCYTES # BLD AUTO: 0.51 K/UL (ref 0–0.8)
MONOCYTES NFR BLD AUTO: 8.6 % (ref 2–6)
MPC BLD CALC-MCNC: 10.8 FL (ref 9.4–12.4)
NEUTROPHILS # BLD AUTO: 2.3 K/UL (ref 1.8–7.7)
NEUTROPHILS NFR BLD AUTO: 38.8 % (ref 53–65)
NONHDLC SERPL-MCNC: 143 MG/DL
NRBC # BLD AUTO: 0 X10E3/UL
NRBC, AUTO (.00): 0 %
PLATELET # BLD AUTO: 330 K/UL (ref 150–400)
POTASSIUM SERPL-SCNC: 4 MMOL/L (ref 3.5–5.1)
PROT SERPL-MCNC: 8.7 G/DL (ref 6.4–8.2)
RBC # BLD AUTO: 3.95 M/UL (ref 4.2–5.4)
SODIUM SERPL-SCNC: 135 MMOL/L (ref 136–145)
TRIGL SERPL-MCNC: 122 MG/DL (ref 35–150)
VLDLC SERPL-MCNC: 24 MG/DL
WBC # BLD AUTO: 5.93 K/UL (ref 4.5–11)

## 2022-09-22 PROCEDURE — 85025 COMPLETE CBC W/AUTO DIFF WBC: CPT | Mod: ,,, | Performed by: CLINICAL MEDICAL LABORATORY

## 2022-09-22 PROCEDURE — 83036 HEMOGLOBIN A1C: ICD-10-PCS | Mod: ,,, | Performed by: CLINICAL MEDICAL LABORATORY

## 2022-09-22 PROCEDURE — 80061 LIPID PANEL: CPT | Mod: ,,, | Performed by: CLINICAL MEDICAL LABORATORY

## 2022-09-22 PROCEDURE — 80061 LIPID PANEL: ICD-10-PCS | Mod: ,,, | Performed by: CLINICAL MEDICAL LABORATORY

## 2022-09-22 PROCEDURE — 99214 PR OFFICE/OUTPT VISIT, EST, LEVL IV, 30-39 MIN: ICD-10-PCS | Mod: ,,, | Performed by: NURSE PRACTITIONER

## 2022-09-22 PROCEDURE — 99214 OFFICE O/P EST MOD 30 MIN: CPT | Mod: ,,, | Performed by: NURSE PRACTITIONER

## 2022-09-22 PROCEDURE — 80053 COMPREHEN METABOLIC PANEL: CPT | Mod: ,,, | Performed by: CLINICAL MEDICAL LABORATORY

## 2022-09-22 PROCEDURE — 80053 COMPREHENSIVE METABOLIC PANEL: ICD-10-PCS | Mod: ,,, | Performed by: CLINICAL MEDICAL LABORATORY

## 2022-09-22 PROCEDURE — 83036 HEMOGLOBIN GLYCOSYLATED A1C: CPT | Mod: ,,, | Performed by: CLINICAL MEDICAL LABORATORY

## 2022-09-22 PROCEDURE — 85025 CBC WITH DIFFERENTIAL: ICD-10-PCS | Mod: ,,, | Performed by: CLINICAL MEDICAL LABORATORY

## 2022-09-22 RX ORDER — CLONIDINE HYDROCHLORIDE 0.2 MG/1
TABLET ORAL
Qty: 180 TABLET | Refills: 1 | Status: SHIPPED | OUTPATIENT
Start: 2022-09-22 | End: 2022-11-15 | Stop reason: SDUPTHER

## 2022-09-22 RX ORDER — MECLIZINE HCL 12.5 MG 12.5 MG/1
TABLET ORAL
Qty: 90 TABLET | Refills: 2 | Status: SHIPPED | OUTPATIENT
Start: 2022-09-22 | End: 2022-11-15 | Stop reason: SDUPTHER

## 2022-09-22 RX ORDER — GABAPENTIN 100 MG/1
100 CAPSULE ORAL 3 TIMES DAILY
Qty: 90 CAPSULE | Refills: 1 | Status: SHIPPED | OUTPATIENT
Start: 2022-09-22 | End: 2022-11-15 | Stop reason: SDUPTHER

## 2022-09-22 RX ORDER — CARVEDILOL 6.25 MG/1
6.25 TABLET ORAL 2 TIMES DAILY WITH MEALS
Qty: 180 TABLET | Refills: 1 | Status: SHIPPED | OUTPATIENT
Start: 2022-09-22 | End: 2022-11-15 | Stop reason: SDUPTHER

## 2022-09-22 RX ORDER — AMLODIPINE BESYLATE 10 MG/1
10 TABLET ORAL DAILY
Qty: 90 TABLET | Refills: 1 | Status: SHIPPED | OUTPATIENT
Start: 2022-09-22 | End: 2022-11-15 | Stop reason: SDUPTHER

## 2022-09-22 RX ORDER — FERROUS SULFATE 325(65) MG
325 TABLET ORAL DAILY
Qty: 90 TABLET | Refills: 1 | Status: SHIPPED | OUTPATIENT
Start: 2022-09-22 | End: 2022-11-15 | Stop reason: SDUPTHER

## 2022-09-22 RX ORDER — METFORMIN HYDROCHLORIDE 500 MG/1
500 TABLET ORAL 2 TIMES DAILY
Qty: 180 TABLET | Refills: 1 | Status: SHIPPED | OUTPATIENT
Start: 2022-09-22 | End: 2022-11-15 | Stop reason: SDUPTHER

## 2022-09-22 RX ORDER — TRIAMCINOLONE ACETONIDE 1 MG/G
CREAM TOPICAL 2 TIMES DAILY
Qty: 15 G | Refills: 0 | Status: SHIPPED | OUTPATIENT
Start: 2022-09-22 | End: 2022-11-15 | Stop reason: SDUPTHER

## 2022-09-22 RX ORDER — ALPRAZOLAM 0.25 MG/1
0.25 TABLET ORAL 2 TIMES DAILY PRN
Qty: 60 TABLET | Refills: 2 | Status: SHIPPED | OUTPATIENT
Start: 2022-09-22 | End: 2022-11-15 | Stop reason: SDUPTHER

## 2022-09-22 RX ORDER — HYDROCHLOROTHIAZIDE 25 MG/1
25 TABLET ORAL DAILY
Qty: 90 TABLET | Refills: 1 | Status: SHIPPED | OUTPATIENT
Start: 2022-09-22 | End: 2022-11-15 | Stop reason: SDUPTHER

## 2022-09-22 RX ORDER — OMEPRAZOLE 20 MG/1
20 CAPSULE, DELAYED RELEASE ORAL 2 TIMES DAILY
Qty: 180 CAPSULE | Refills: 1 | Status: SHIPPED | OUTPATIENT
Start: 2022-09-22 | End: 2022-11-15 | Stop reason: SDUPTHER

## 2022-09-22 NOTE — PROGRESS NOTES
Myranda Velásquez NP   [id number] [facility] [address]  [place]  [address]      PATIENT NAME: Yana Oates  : 1945  DATE: 22  MRN: [id number]      Billing Provider: Myranda Velásquez NP  Level of Service:   Patient PCP Information       Provider PCP Type    [facility]            Reason for Visit / Chief Complaint: med refills and numbness in feet (States she has started to have some numbness in her feet )       Update PCP  Update Chief Complaint         History of Present Illness / Problem Focused Workflow     Yana Oates presents to the clinic with med refills and numbness in feet (States she has started to have some numbness in her feet )     77 year old female presents to clinic with complaints of burning, numbness, and tingling to bilat feet which she reports started about a month ago. She is also requesting refills on her regular medications. She states she takes her medications daily as ordered and denies any other problems at today's visit.     Review of Systems     @Review of Systems   Constitutional:  Negative for activity change, appetite change, fatigue and fever.   HENT:  Negative for nasal congestion, ear pain, rhinorrhea, sinus pressure/congestion and sore throat.    Eyes:  Negative for pain, redness, visual disturbance and eye dryness.   Respiratory:  Negative for cough and shortness of breath.    Cardiovascular:  Negative for chest pain and leg swelling.   Gastrointestinal:  Negative for abdominal distention, abdominal pain, constipation and diarrhea.   Endocrine: Negative for cold intolerance, heat intolerance and polyuria.   Genitourinary:  Negative for bladder incontinence, dysuria, frequency and urgency.   Musculoskeletal:  Negative for arthralgias, gait problem and myalgias.   Integumentary:  Negative for color change, rash and wound.   Allergic/Immunologic: Negative for environmental allergies and food allergies.   Neurological:  Positive for  numbness. Negative for dizziness, weakness, light-headedness and headaches.   Psychiatric/Behavioral:  Negative for behavioral problems and sleep disturbance.      Medical / Social / Family History     Past Medical History:   Diagnosis Date    Anxiety     Diabetes mellitus, type 2     Essential hypertension, benign     Fatigue     GERD (gastroesophageal reflux disease)     Low back pain     Osteoarthritis     Other long term (current) drug therapy     Strain of muscle and tendon of back wall of thorax, initial encounter        Past Surgical History:   Procedure Laterality Date    APPENDECTOMY      CHOLECYSTECTOMY      HYSTERECTOMY      partial    LIPOMA RESECTION Left     LUNG LOBECTOMY Right     due to trauma       Social History  Ms.  reports that she has never smoked. She has never used smokeless tobacco. She reports that she does not drink alcohol and does not use drugs.    Family History  Ms.'s family history includes Arthritis in her sister; Asthma in her father; Atrial fibrillation in her sister; Brain cancer in her brother; COPD in her brother, brother, and sister; Cancer in her brother and brother; Cervical cancer in her daughter; Heart disease in her brother and mother; Hypertension in her mother; Lung cancer in her brother; No Known Problems in her brother, brother, daughter, daughter, daughter, daughter, daughter, daughter, sister, sister, and sister; Osteoarthritis in her mother; Thyroid disease in her daughter.    Medications and Allergies     Medications  Outpatient Medications Marked as Taking for the 9/22/22 encounter (Office Visit) with Myranda Velsáquez NP   Medication Sig Dispense Refill    blood sugar diagnostic Strp by Misc.(Non-Drug; Combo Route) route. CHECK BLOOD SUGAR ONE TIME DAILY AND AS NEEDED      diclofenac (VOLTAREN) 50 MG EC tablet Take 50 mg by mouth 2 (two) times daily.      diclofenac sodium (VOLTAREN) 1 % Gel Apply 2 g topically 2 (two) times daily.       fluticasone propionate (FLONASE) 50 mcg/actuation nasal spray USE 1 SPRAY IN EACH NOSTRIL 2 TIMES A DAY ((SHAKE WELL)) 48 g 2    multivitamin-iron-folic acid Tab Take 1 tablet by mouth once daily.      [DISCONTINUED] ALPRAZolam (XANAX) 0.25 MG tablet Take 1 tablet (0.25 mg total) by mouth 2 (two) times daily as needed for Anxiety. 60 tablet 2    [DISCONTINUED] amLODIPine (NORVASC) 10 MG tablet Take 1 tablet (10 mg total) by mouth once daily. 90 tablet 1    [DISCONTINUED] carvediloL (COREG) 6.25 MG tablet Take 1 tablet (6.25 mg total) by mouth 2 (two) times daily with meals. 180 tablet 1    [DISCONTINUED] cloNIDine (CATAPRES) 0.2 MG tablet TAKE ONE-HALF TO ONE TAB TWICE DAILY 180 tablet 1    [DISCONTINUED] ferrous sulfate (IRON, FERROUS SULFATE,) 325 mg (65 mg iron) Tab tablet Take 1 tablet (325 mg total) by mouth once daily. 90 tablet 1    [DISCONTINUED] hydroCHLOROthiazide (HYDRODIURIL) 25 MG tablet Take 1 tablet (25 mg total) by mouth once daily. 90 tablet 1    [DISCONTINUED] magnesium chloride (MAG 64) 64 mg TbEC Take 1 tablet (64 mg total) by mouth 2 (two) times daily. 180 tablet 1    [DISCONTINUED] meclizine (ANTIVERT) 12.5 mg tablet TAKE 1 TABLET BY MOUTH 3 TIMES A DAY AS NEEDED FOR DIZZINESS (MAY CAUSE DROWSINESS) 90 tablet 2    [DISCONTINUED] metFORMIN (GLUCOPHAGE) 500 MG tablet Take 1 tablet (500 mg total) by mouth 2 (two) times a day. 180 tablet 1    [DISCONTINUED] omeprazole (PRILOSEC) 20 MG capsule Take 1 capsule (20 mg total) by mouth 2 (two) times a day. 180 capsule 1    [DISCONTINUED] triamcinolone acetonide 0.1% (KENALOG) 0.1 % cream Apply topically 2 (two) times daily. APPLY TO AFFECTED AREA TWO TIMES DAILY         Allergies  Review of patient's allergies indicates:   Allergen Reactions    Ace inhibitors Anaphylaxis    Penicillins Anaphylaxis    Bactrim [sulfamethoxazole-trimethoprim]     Biaxin [clarithromycin]     Ciprofloxacin     Iodinated contrast media     Iodine and iodide  containing products Hives    Latex     Mobic [meloxicam]     Trimethoprim     Sulfa (sulfonamide antibiotics) Hives and Rash       Physical Examination     Vitals:    09/22/22 0831   BP: 138/84   Pulse: 100   Resp: 18   Temp: 98.2 °F (36.8 °C)     Physical Exam  Vitals and nursing note reviewed.   HENT:      Head: Normocephalic.      Right Ear: Tympanic membrane normal.      Left Ear: Tympanic membrane normal.      Nose: Nose normal.      Mouth/Throat:      Mouth: Mucous membranes are moist.      Pharynx: Oropharynx is clear. No posterior oropharyngeal erythema.   Eyes:      Conjunctiva/sclera: Conjunctivae normal.   Cardiovascular:      Rate and Rhythm: Normal rate and regular rhythm.      Pulses:           Dorsalis pedis pulses are 2+ on the right side and 2+ on the left side.        Posterior tibial pulses are 2+ on the right side and 2+ on the left side.      Heart sounds: Normal heart sounds.   Pulmonary:      Effort: Pulmonary effort is normal.      Breath sounds: Normal breath sounds.   Abdominal:      General: Abdomen is flat. Bowel sounds are normal. There is no distension.      Palpations: Abdomen is soft.   Musculoskeletal:         General: No swelling or tenderness. Normal range of motion.      Cervical back: Normal range of motion.      Right lower leg: No edema.      Left lower leg: No edema.   Feet:      Right foot:      Protective Sensation: 9 sites tested.  6 sites sensed.      Skin integrity: Skin integrity normal.      Toenail Condition: Right toenails are normal.      Left foot:      Protective Sensation: 9 sites tested.  6 sites sensed.      Skin integrity: Skin integrity normal.      Toenail Condition: Left toenails are normal.   Skin:     General: Skin is warm and dry.   Neurological:      Mental Status: She is alert. Mental status is at baseline.   Psychiatric:         Mood and Affect: Mood normal.         Behavior: Behavior normal.             Lab Results   Component Value Date    WBC  6.00 03/24/2022    HGB 12.2 03/24/2022    HCT 38.6 03/24/2022    MCV 99.5 (H) 03/24/2022     03/24/2022          Sodium   Date Value Ref Range Status   06/24/2022 138 136 - 145 mmol/L Final     Potassium   Date Value Ref Range Status   06/24/2022 3.6 3.5 - 5.1 mmol/L Final     Chloride   Date Value Ref Range Status   06/24/2022 99 98 - 107 mmol/L Final     CO2   Date Value Ref Range Status   06/24/2022 32 21 - 32 mmol/L Final     Glucose   Date Value Ref Range Status   06/24/2022 105 74 - 106 mg/dL Final     BUN   Date Value Ref Range Status   06/24/2022 8 7 - 18 mg/dL Final     Creatinine   Date Value Ref Range Status   06/24/2022 0.73 0.55 - 1.02 mg/dL Final     Calcium   Date Value Ref Range Status   06/24/2022 9.9 8.5 - 10.1 mg/dL Final     Total Protein   Date Value Ref Range Status   06/24/2022 7.8 6.4 - 8.2 g/dL Final     Albumin   Date Value Ref Range Status   06/24/2022 3.7 3.5 - 5.0 g/dL Final     Bilirubin, Total   Date Value Ref Range Status   06/24/2022 0.4 0.0 - 1.2 mg/dL Final     Alk Phos   Date Value Ref Range Status   06/24/2022 115 55 - 142 U/L Final     AST   Date Value Ref Range Status   06/24/2022 8 (L) 15 - 37 U/L Final     ALT   Date Value Ref Range Status   06/24/2022 14 13 - 56 U/L Final     Anion Gap   Date Value Ref Range Status   06/24/2022 11 7 - 16 mmol/L Final     eGFR    Date Value Ref Range Status   09/22/2021 88 >=60 mL/min/1.73m² Final     eGFR   Date Value Ref Range Status   06/24/2022 82 >=60 mL/min/1.73m² Final      X-Ray Hand 3 View Left  Narrative: EXAMINATION:  XR HAND COMPLETE 3 VIEW LEFT    CLINICAL HISTORY:  .  Pain in left hand    TECHNIQUE:  AP, lateral, and oblique views left hand    COMPARISON:  No previous similar    FINDINGS:  There is no acute fracture or dislocation.  There is moderate osteophyte formation and degenerative joint space narrowing at the interphalangeal joint of the thumb.  There is some minimal scattered osteophyte formation  of the interphalangeal joints otherwise.  There is some mild osteophyte formation in the radial aspect of the carpus.  No areas of active joint erosion are identified.  Osseous structures are fairly well mineralized.  Impression: No acute process.  Osteoarthritis    Electronically signed by: Jose Alfredo Power  Date:    01/11/2022  Time:    11:11     Procedures   Assessment and Plan (including Health Maintenance)      Problem List  Smart Sets  Document Outside HM   :    Plan:           Problem List Items Addressed This Visit          Psychiatric    Anxiety    Relevant Medications    ALPRAZolam (XANAX) 0.25 MG tablet    Other Relevant Orders    POCT Urine Drug Screen Presump       Cardiac/Vascular    Essential hypertension, benign    Relevant Medications    amLODIPine (NORVASC) 10 MG tablet    carvediloL (COREG) 6.25 MG tablet    hydroCHLOROthiazide (HYDRODIURIL) 25 MG tablet    Other Relevant Orders    Lipid Panel    CBC Auto Differential    Comprehensive Metabolic Panel       Oncology    Iron deficiency anemia    Relevant Medications    ferrous sulfate (IRON, FERROUS SULFATE,) 325 mg (65 mg iron) Tab tablet       Endocrine    Diabetes mellitus, type 2    Relevant Medications    metFORMIN (GLUCOPHAGE) 500 MG tablet    Other Relevant Orders    Hemoglobin A1C    Lipid Panel    CBC Auto Differential    Comprehensive Metabolic Panel       GI    GERD (gastroesophageal reflux disease)    Relevant Medications    omeprazole (PRILOSEC) 20 MG capsule       Palliative Care    Other long term (current) drug therapy     Other Visit Diagnoses       Long-term use of high-risk medication        Relevant Orders    POCT Urine Drug Screen Presump            Health Maintenance Topics with due status: Not Due       Topic Last Completion Date    Foot Exam 12/21/2021    Lipid Panel 03/24/2022    Eye Exam 06/13/2022    Diabetes Urine Screening 06/24/2022       Future Appointments   Date Time Provider Department Center   1/3/2023 10:00 AM VERENA  [facility]        Health Maintenance Due   Topic Date Due    DEXA Scan  Never done    Hemoglobin A1c  09/24/2022        No follow-ups on file.     Signature:  [facility]IC[facility]  [address] [place] [address], [place]  [address]    Date of encounter: 9/22/22

## 2022-09-23 NOTE — PROGRESS NOTES
Labs reviewed: Hgb A1C is 6.5. Great work. No changes in medication needed. Cholesterol slightly elevated but at patient age I feel that starting statin would have little benefit. Sodium slightly low but potassium good (potassium had been low in past and patient had concerns) recommend increasing sodium in diet. CBC with slightly low RBCs, higher than they have been in past though and Hgb and Hct remain stable. No changes needed. Please contact patient and let her know. Recommend recheck of labs in 6 months.

## 2022-09-27 PROBLEM — E11.41 DIABETIC MONONEUROPATHY ASSOCIATED WITH TYPE 2 DIABETES MELLITUS: Status: ACTIVE | Noted: 2022-09-27

## 2022-09-27 NOTE — ASSESSMENT & PLAN NOTE
Patient reports GERD well controlled on Omeprazole . Continue at current dosage. Follow up in 3 months or as needed.

## 2022-09-27 NOTE — ASSESSMENT & PLAN NOTE
CBC obtained. Continue Iron at current dosage. Increase Iron in diet. Follow up in 3 months or as needed.

## 2022-09-27 NOTE — ASSESSMENT & PLAN NOTE
Start neurontin 100mg TID. Instructed patient use caution when taking this medication as it can cause drowsiness or dizziness. It affects people differently and you may not have these effects but just be cautious. Instructed her to look at her feet daily and check for any wounds or breakdown due to the decreased sensation in her feet she may not know when she has an injury. Follow up in 2 months.

## 2022-09-27 NOTE — ASSESSMENT & PLAN NOTE
Blood pressure well controlled at today's visit. Continue at current dosage. Follow up in 3 months or as needed.   CMP and lipid panel obtained today. Goal LDL is less than 70.

## 2022-09-27 NOTE — ASSESSMENT & PLAN NOTE
Hgb A1C obtained today. Patient reports fasting glucose has been well controlled. Continue current medications. Goal A1C is around 7. We can be more lax due to patient's age and risk for hypoglycemic episodes.

## 2022-09-27 NOTE — ASSESSMENT & PLAN NOTE
Reports anxiety well controlled with current medications.  with no signs of abuse. No aberrant behavior noted.  UDS obtained at today's visit. Will continue at current dosage. Follow up in 3 months or as needed.

## 2022-10-09 DIAGNOSIS — Z71.89 COMPLEX CARE COORDINATION: ICD-10-CM

## 2022-11-15 ENCOUNTER — OFFICE VISIT (OUTPATIENT)
Dept: FAMILY MEDICINE | Facility: CLINIC | Age: 77
End: 2022-11-15
Payer: MEDICARE

## 2022-11-15 VITALS
BODY MASS INDEX: 24.96 KG/M2 | OXYGEN SATURATION: 98 % | HEART RATE: 89 BPM | SYSTOLIC BLOOD PRESSURE: 122 MMHG | DIASTOLIC BLOOD PRESSURE: 78 MMHG | HEIGHT: 64 IN | TEMPERATURE: 98 F | RESPIRATION RATE: 20 BRPM | WEIGHT: 146.19 LBS

## 2022-11-15 DIAGNOSIS — Z79.899 ENCOUNTER FOR LONG-TERM (CURRENT) USE OF MEDICATIONS: ICD-10-CM

## 2022-11-15 DIAGNOSIS — F41.9 ANXIETY: ICD-10-CM

## 2022-11-15 DIAGNOSIS — E11.41 DIABETIC MONONEUROPATHY ASSOCIATED WITH TYPE 2 DIABETES MELLITUS: ICD-10-CM

## 2022-11-15 DIAGNOSIS — R42 DIZZINESS: ICD-10-CM

## 2022-11-15 DIAGNOSIS — D50.9 IRON DEFICIENCY ANEMIA, UNSPECIFIED IRON DEFICIENCY ANEMIA TYPE: ICD-10-CM

## 2022-11-15 DIAGNOSIS — T78.40XD ALLERGY, SUBSEQUENT ENCOUNTER: ICD-10-CM

## 2022-11-15 DIAGNOSIS — K21.9 GASTROESOPHAGEAL REFLUX DISEASE, UNSPECIFIED WHETHER ESOPHAGITIS PRESENT: ICD-10-CM

## 2022-11-15 DIAGNOSIS — E11.9 TYPE 2 DIABETES MELLITUS WITHOUT COMPLICATION, WITHOUT LONG-TERM CURRENT USE OF INSULIN: Primary | ICD-10-CM

## 2022-11-15 DIAGNOSIS — I10 ESSENTIAL HYPERTENSION, BENIGN: ICD-10-CM

## 2022-11-15 LAB
CTP QC/QA: YES
POC (AMP) AMPHETAMINE: NEGATIVE
POC (BAR) BARBITURATES: NEGATIVE
POC (BUP) BUPRENORPHINE: NEGATIVE
POC (BZO) BENZODIAZEPINES: ABNORMAL
POC (COC) COCAINE: NEGATIVE
POC (MDMA) METHYLENEDIOXYMETHAMPHETAMINE 3,4: NEGATIVE
POC (MET) METHAMPHETAMINE: NEGATIVE
POC (MOP) OPIATES: NEGATIVE
POC (MTD) METHADONE: NEGATIVE
POC (OXY) OXYCODONE: NEGATIVE
POC (PCP) PHENCYCLIDINE: NEGATIVE
POC (TCA) TRICYCLIC ANTIDEPRESSANTS: NEGATIVE
POC TEMPERATURE (URINE): ABNORMAL
POC THC: NEGATIVE

## 2022-11-15 PROCEDURE — 80305 DRUG TEST PRSMV DIR OPT OBS: CPT | Mod: RHCUB | Performed by: NURSE PRACTITIONER

## 2022-11-15 PROCEDURE — 99213 PR OFFICE/OUTPT VISIT, EST, LEVL III, 20-29 MIN: ICD-10-PCS | Mod: ,,, | Performed by: NURSE PRACTITIONER

## 2022-11-15 PROCEDURE — 99213 OFFICE O/P EST LOW 20 MIN: CPT | Mod: ,,, | Performed by: NURSE PRACTITIONER

## 2022-11-15 RX ORDER — HYDROCHLOROTHIAZIDE 25 MG/1
25 TABLET ORAL DAILY
Qty: 90 TABLET | Refills: 1 | Status: SHIPPED | OUTPATIENT
Start: 2022-11-15 | End: 2023-03-08 | Stop reason: SDUPTHER

## 2022-11-15 RX ORDER — MECLIZINE HCL 12.5 MG 12.5 MG/1
TABLET ORAL
Qty: 90 TABLET | Refills: 2 | Status: SHIPPED | OUTPATIENT
Start: 2022-11-15 | End: 2023-03-08 | Stop reason: SDUPTHER

## 2022-11-15 RX ORDER — OMEPRAZOLE 20 MG/1
20 CAPSULE, DELAYED RELEASE ORAL 2 TIMES DAILY
Qty: 180 CAPSULE | Refills: 1 | Status: SHIPPED | OUTPATIENT
Start: 2022-11-15 | End: 2023-03-08 | Stop reason: SDUPTHER

## 2022-11-15 RX ORDER — ALPRAZOLAM 0.25 MG/1
0.25 TABLET ORAL 2 TIMES DAILY PRN
Qty: 60 TABLET | Refills: 2 | Status: SHIPPED | OUTPATIENT
Start: 2022-11-15 | End: 2023-03-08 | Stop reason: SDUPTHER

## 2022-11-15 RX ORDER — FERROUS SULFATE 325(65) MG
325 TABLET ORAL DAILY
Qty: 90 TABLET | Refills: 1 | Status: SHIPPED | OUTPATIENT
Start: 2022-11-15 | End: 2023-03-08 | Stop reason: SDUPTHER

## 2022-11-15 RX ORDER — CLONIDINE HYDROCHLORIDE 0.2 MG/1
TABLET ORAL
Qty: 180 TABLET | Refills: 1 | Status: SHIPPED | OUTPATIENT
Start: 2022-11-15 | End: 2023-03-08 | Stop reason: SDUPTHER

## 2022-11-15 RX ORDER — CARVEDILOL 6.25 MG/1
6.25 TABLET ORAL 2 TIMES DAILY WITH MEALS
Qty: 180 TABLET | Refills: 1 | Status: SHIPPED | OUTPATIENT
Start: 2022-11-15 | End: 2023-03-08 | Stop reason: SDUPTHER

## 2022-11-15 RX ORDER — AMLODIPINE BESYLATE 10 MG/1
10 TABLET ORAL DAILY
Qty: 90 TABLET | Refills: 1 | Status: SHIPPED | OUTPATIENT
Start: 2022-11-15 | End: 2023-03-08 | Stop reason: SDUPTHER

## 2022-11-15 RX ORDER — METFORMIN HYDROCHLORIDE 500 MG/1
500 TABLET ORAL 2 TIMES DAILY
Qty: 180 TABLET | Refills: 1 | Status: SHIPPED | OUTPATIENT
Start: 2022-11-15 | End: 2023-03-08 | Stop reason: SDUPTHER

## 2022-11-15 RX ORDER — FLUTICASONE PROPIONATE 50 MCG
SPRAY, SUSPENSION (ML) NASAL
Qty: 48 G | Refills: 2 | Status: SHIPPED | OUTPATIENT
Start: 2022-11-15 | End: 2023-07-24 | Stop reason: SDUPTHER

## 2022-11-15 RX ORDER — TRIAMCINOLONE ACETONIDE 1 MG/G
CREAM TOPICAL 2 TIMES DAILY
Qty: 15 G | Refills: 0 | Status: SHIPPED | OUTPATIENT
Start: 2022-11-15 | End: 2023-03-08 | Stop reason: SDUPTHER

## 2022-11-15 RX ORDER — GABAPENTIN 100 MG/1
100 CAPSULE ORAL 3 TIMES DAILY
Qty: 90 CAPSULE | Refills: 1 | Status: SHIPPED | OUTPATIENT
Start: 2022-11-15 | End: 2023-03-08 | Stop reason: SDUPTHER

## 2023-03-08 ENCOUNTER — OFFICE VISIT (OUTPATIENT)
Dept: FAMILY MEDICINE | Facility: CLINIC | Age: 78
End: 2023-03-08
Payer: MEDICARE

## 2023-03-08 VITALS
WEIGHT: 147.63 LBS | SYSTOLIC BLOOD PRESSURE: 120 MMHG | OXYGEN SATURATION: 98 % | DIASTOLIC BLOOD PRESSURE: 74 MMHG | BODY MASS INDEX: 25.2 KG/M2 | RESPIRATION RATE: 18 BRPM | HEIGHT: 64 IN | TEMPERATURE: 98 F | HEART RATE: 85 BPM

## 2023-03-08 DIAGNOSIS — E11.9 TYPE 2 DIABETES MELLITUS WITHOUT COMPLICATION, WITHOUT LONG-TERM CURRENT USE OF INSULIN: ICD-10-CM

## 2023-03-08 DIAGNOSIS — Z79.899 OTHER LONG TERM (CURRENT) DRUG THERAPY: Primary | ICD-10-CM

## 2023-03-08 DIAGNOSIS — I10 ESSENTIAL HYPERTENSION, BENIGN: ICD-10-CM

## 2023-03-08 DIAGNOSIS — F41.9 ANXIETY: ICD-10-CM

## 2023-03-08 DIAGNOSIS — D50.9 IRON DEFICIENCY ANEMIA, UNSPECIFIED IRON DEFICIENCY ANEMIA TYPE: ICD-10-CM

## 2023-03-08 DIAGNOSIS — E11.41 DIABETIC MONONEUROPATHY ASSOCIATED WITH TYPE 2 DIABETES MELLITUS: ICD-10-CM

## 2023-03-08 DIAGNOSIS — K21.9 GASTROESOPHAGEAL REFLUX DISEASE, UNSPECIFIED WHETHER ESOPHAGITIS PRESENT: ICD-10-CM

## 2023-03-08 DIAGNOSIS — E78.5 HYPERLIPIDEMIA, UNSPECIFIED HYPERLIPIDEMIA TYPE: ICD-10-CM

## 2023-03-08 DIAGNOSIS — R42 DIZZINESS: ICD-10-CM

## 2023-03-08 LAB
ALBUMIN SERPL BCP-MCNC: 4.1 G/DL (ref 3.5–5)
ALBUMIN/GLOB SERPL: 1.1 {RATIO}
ALP SERPL-CCNC: 114 U/L (ref 55–142)
ALT SERPL W P-5'-P-CCNC: 16 U/L (ref 13–56)
ANION GAP SERPL CALCULATED.3IONS-SCNC: 12 MMOL/L (ref 7–16)
AST SERPL W P-5'-P-CCNC: 10 U/L (ref 15–37)
BASOPHILS # BLD AUTO: 0.02 K/UL (ref 0–0.2)
BASOPHILS NFR BLD AUTO: 0.4 % (ref 0–1)
BILIRUB SERPL-MCNC: 0.4 MG/DL (ref ?–1.2)
BUN SERPL-MCNC: 9 MG/DL (ref 7–18)
BUN/CREAT SERPL: 12 (ref 6–20)
CALCIUM SERPL-MCNC: 9.8 MG/DL (ref 8.5–10.1)
CHLORIDE SERPL-SCNC: 96 MMOL/L (ref 98–107)
CHOLEST SERPL-MCNC: 225 MG/DL (ref 0–200)
CHOLEST/HDLC SERPL: 3 {RATIO}
CO2 SERPL-SCNC: 29 MMOL/L (ref 21–32)
CREAT SERPL-MCNC: 0.74 MG/DL (ref 0.55–1.02)
CTP QC/QA: YES
DIFFERENTIAL METHOD BLD: ABNORMAL
EGFR (NO RACE VARIABLE) (RUSH/TITUS): 82 ML/MIN/1.73M²
EOSINOPHIL # BLD AUTO: 0.39 K/UL (ref 0–0.5)
EOSINOPHIL NFR BLD AUTO: 7 % (ref 1–4)
ERYTHROCYTE [DISTWIDTH] IN BLOOD BY AUTOMATED COUNT: 12.3 % (ref 11.5–14.5)
EST. AVERAGE GLUCOSE BLD GHB EST-MCNC: 140 MG/DL
GLOBULIN SER-MCNC: 3.9 G/DL (ref 2–4)
GLUCOSE SERPL-MCNC: 118 MG/DL (ref 74–106)
HBA1C MFR BLD HPLC: 6.8 % (ref 4.5–6.6)
HCT VFR BLD AUTO: 34.9 % (ref 38–47)
HDLC SERPL-MCNC: 75 MG/DL (ref 40–60)
HGB BLD-MCNC: 11.9 G/DL (ref 12–16)
IMM GRANULOCYTES # BLD AUTO: 0.01 K/UL (ref 0–0.04)
IMM GRANULOCYTES NFR BLD: 0.2 % (ref 0–0.4)
LDLC SERPL CALC-MCNC: 131 MG/DL
LDLC/HDLC SERPL: 1.7 {RATIO}
LYMPHOCYTES # BLD AUTO: 2.65 K/UL (ref 1–4.8)
LYMPHOCYTES NFR BLD AUTO: 47.4 % (ref 27–41)
MCH RBC QN AUTO: 32.2 PG (ref 27–31)
MCHC RBC AUTO-ENTMCNC: 34.1 G/DL (ref 32–36)
MCV RBC AUTO: 94.6 FL (ref 80–96)
MONOCYTES # BLD AUTO: 0.5 K/UL (ref 0–0.8)
MONOCYTES NFR BLD AUTO: 8.9 % (ref 2–6)
MPC BLD CALC-MCNC: 11 FL (ref 9.4–12.4)
NEUTROPHILS # BLD AUTO: 2.02 K/UL (ref 1.8–7.7)
NEUTROPHILS NFR BLD AUTO: 36.1 % (ref 53–65)
NONHDLC SERPL-MCNC: 150 MG/DL
NRBC # BLD AUTO: 0 X10E3/UL
NRBC, AUTO (.00): 0 %
PLATELET # BLD AUTO: 289 K/UL (ref 150–400)
POC (AMP) AMPHETAMINE: NEGATIVE
POC (BAR) BARBITURATES: NEGATIVE
POC (BUP) BUPRENORPHINE: NEGATIVE
POC (BZO) BENZODIAZEPINES: ABNORMAL
POC (COC) COCAINE: NEGATIVE
POC (MDMA) METHYLENEDIOXYMETHAMPHETAMINE 3,4: NEGATIVE
POC (MET) METHAMPHETAMINE: NEGATIVE
POC (MOP) OPIATES: NEGATIVE
POC (MTD) METHADONE: NEGATIVE
POC (OXY) OXYCODONE: NEGATIVE
POC (PCP) PHENCYCLIDINE: NEGATIVE
POC (TCA) TRICYCLIC ANTIDEPRESSANTS: NEGATIVE
POC TEMPERATURE (URINE): 92
POC THC: NEGATIVE
POTASSIUM SERPL-SCNC: 4.1 MMOL/L (ref 3.5–5.1)
PROT SERPL-MCNC: 8 G/DL (ref 6.4–8.2)
RBC # BLD AUTO: 3.69 M/UL (ref 4.2–5.4)
SODIUM SERPL-SCNC: 133 MMOL/L (ref 136–145)
TRIGL SERPL-MCNC: 96 MG/DL (ref 35–150)
VLDLC SERPL-MCNC: 19 MG/DL
WBC # BLD AUTO: 5.59 K/UL (ref 4.5–11)

## 2023-03-08 PROCEDURE — 99214 PR OFFICE/OUTPT VISIT, EST, LEVL IV, 30-39 MIN: ICD-10-PCS | Mod: ,,, | Performed by: NURSE PRACTITIONER

## 2023-03-08 PROCEDURE — 83036 HEMOGLOBIN A1C: ICD-10-PCS | Mod: ,,, | Performed by: CLINICAL MEDICAL LABORATORY

## 2023-03-08 PROCEDURE — 99214 OFFICE O/P EST MOD 30 MIN: CPT | Mod: ,,, | Performed by: NURSE PRACTITIONER

## 2023-03-08 PROCEDURE — 80053 COMPREHEN METABOLIC PANEL: CPT | Mod: ,,, | Performed by: CLINICAL MEDICAL LABORATORY

## 2023-03-08 PROCEDURE — 85025 CBC WITH DIFFERENTIAL: ICD-10-PCS | Mod: ,,, | Performed by: CLINICAL MEDICAL LABORATORY

## 2023-03-08 PROCEDURE — 85025 COMPLETE CBC W/AUTO DIFF WBC: CPT | Mod: ,,, | Performed by: CLINICAL MEDICAL LABORATORY

## 2023-03-08 PROCEDURE — 80061 LIPID PANEL: CPT | Mod: ,,, | Performed by: CLINICAL MEDICAL LABORATORY

## 2023-03-08 PROCEDURE — 83036 HEMOGLOBIN GLYCOSYLATED A1C: CPT | Mod: ,,, | Performed by: CLINICAL MEDICAL LABORATORY

## 2023-03-08 PROCEDURE — 80053 COMPREHENSIVE METABOLIC PANEL: ICD-10-PCS | Mod: ,,, | Performed by: CLINICAL MEDICAL LABORATORY

## 2023-03-08 PROCEDURE — 80061 LIPID PANEL: ICD-10-PCS | Mod: ,,, | Performed by: CLINICAL MEDICAL LABORATORY

## 2023-03-08 PROCEDURE — 80305 DRUG TEST PRSMV DIR OPT OBS: CPT | Mod: RHCUB | Performed by: NURSE PRACTITIONER

## 2023-03-08 RX ORDER — TRIAMCINOLONE ACETONIDE 1 MG/G
CREAM TOPICAL 2 TIMES DAILY
Qty: 15 G | Refills: 0 | Status: SHIPPED | OUTPATIENT
Start: 2023-03-08 | End: 2023-05-26 | Stop reason: SDUPTHER

## 2023-03-08 RX ORDER — OMEPRAZOLE 20 MG/1
20 CAPSULE, DELAYED RELEASE ORAL 2 TIMES DAILY
Qty: 180 CAPSULE | Refills: 1 | Status: SHIPPED | OUTPATIENT
Start: 2023-03-08 | End: 2023-07-24 | Stop reason: SDUPTHER

## 2023-03-08 RX ORDER — CARVEDILOL 6.25 MG/1
6.25 TABLET ORAL 2 TIMES DAILY WITH MEALS
Qty: 180 TABLET | Refills: 1 | Status: SHIPPED | OUTPATIENT
Start: 2023-03-08 | End: 2023-05-26 | Stop reason: SDUPTHER

## 2023-03-08 RX ORDER — FERROUS SULFATE 325(65) MG
325 TABLET ORAL DAILY
Qty: 90 TABLET | Refills: 1 | Status: SHIPPED | OUTPATIENT
Start: 2023-03-08 | End: 2023-05-26 | Stop reason: SDUPTHER

## 2023-03-08 RX ORDER — ALPRAZOLAM 0.25 MG/1
0.25 TABLET ORAL 2 TIMES DAILY PRN
Qty: 60 TABLET | Refills: 0 | Status: SHIPPED | OUTPATIENT
Start: 2023-03-08 | End: 2023-05-26 | Stop reason: SDUPTHER

## 2023-03-08 RX ORDER — GABAPENTIN 100 MG/1
100 CAPSULE ORAL 3 TIMES DAILY
Qty: 90 CAPSULE | Refills: 1 | Status: SHIPPED | OUTPATIENT
Start: 2023-03-08 | End: 2023-05-26 | Stop reason: SDUPTHER

## 2023-03-08 RX ORDER — AMLODIPINE BESYLATE 10 MG/1
10 TABLET ORAL DAILY
Qty: 90 TABLET | Refills: 1 | Status: SHIPPED | OUTPATIENT
Start: 2023-03-08 | End: 2023-05-26 | Stop reason: SDUPTHER

## 2023-03-08 RX ORDER — METFORMIN HYDROCHLORIDE 500 MG/1
500 TABLET ORAL 2 TIMES DAILY
Qty: 180 TABLET | Refills: 1 | Status: SHIPPED | OUTPATIENT
Start: 2023-03-08 | End: 2023-07-24 | Stop reason: SDUPTHER

## 2023-03-08 RX ORDER — ALPRAZOLAM 0.25 MG/1
0.25 TABLET ORAL 2 TIMES DAILY
Qty: 60 TABLET | Refills: 0 | Status: SHIPPED | OUTPATIENT
Start: 2023-03-08 | End: 2023-04-07

## 2023-03-08 RX ORDER — MECLIZINE HCL 12.5 MG 12.5 MG/1
TABLET ORAL
Qty: 90 TABLET | Refills: 2 | Status: SHIPPED | OUTPATIENT
Start: 2023-03-08 | End: 2023-10-03

## 2023-03-08 RX ORDER — HYDROCHLOROTHIAZIDE 25 MG/1
25 TABLET ORAL DAILY
Qty: 90 TABLET | Refills: 1 | Status: SHIPPED | OUTPATIENT
Start: 2023-03-08 | End: 2023-05-26 | Stop reason: SDUPTHER

## 2023-03-08 RX ORDER — CLONIDINE HYDROCHLORIDE 0.2 MG/1
TABLET ORAL
Qty: 180 TABLET | Refills: 1 | Status: SHIPPED | OUTPATIENT
Start: 2023-03-08 | End: 2023-05-26 | Stop reason: SDUPTHER

## 2023-03-08 NOTE — PROGRESS NOTES
Myranda Velásquez NP   [facility]  [address]  [place]  [address]      PATIENT NAME: Yana Oates  : 1945  DATE: 3/8/23  MRN: [id number]      Billing Provider: Myranda Velásquez NP  Level of Service: CT OFFICE/OUTPT VISIT, EST, LEVL IV, 30-39 MIN  Patient PCP Information       Provider PCP Type    Myranda Velásquez NP General            Reason for Visit / Chief Complaint: Diabetes (Here for check up and med refills.) and Hypertension (Here for check up and med refills.)       Update PCP  Update Chief Complaint         History of Present Illness / Problem Focused Workflow     Yana Oates presents to the clinic with Diabetes (Here for check up and med refills.) and Hypertension (Here for check up and med refills.)     78 year old female presents to clinic for check up and medication refill. States she has been doing well.       Review of Systems     @Review of Systems   Constitutional:  Negative for activity change, appetite change, fatigue and fever.   HENT:  Negative for nasal congestion, ear pain, rhinorrhea, sinus pressure/congestion and sore throat.    Eyes:  Negative for pain, redness, visual disturbance and eye dryness.   Respiratory:  Negative for cough and shortness of breath.    Cardiovascular:  Negative for chest pain and leg swelling.   Gastrointestinal:  Negative for abdominal distention, abdominal pain, constipation and diarrhea.   Endocrine: Negative for cold intolerance, heat intolerance and polyuria.   Genitourinary:  Negative for bladder incontinence, dysuria, frequency and urgency.   Musculoskeletal:  Negative for arthralgias, gait problem and myalgias.   Integumentary:  Negative for color change, rash and wound.   Allergic/Immunologic: Negative for environmental allergies and food allergies.   Neurological:  Negative for dizziness, weakness, light-headedness and headaches.   Psychiatric/Behavioral:  Negative for behavioral problems and sleep disturbance.      Medical  / Social / Family History     Past Medical History:   Diagnosis Date    Anxiety     Diabetes mellitus, type 2     Essential hypertension, benign     Fatigue     GERD (gastroesophageal reflux disease)     Low back pain     Osteoarthritis     Other long term (current) drug therapy     Strain of muscle and tendon of back wall of thorax, initial encounter        Past Surgical History:   Procedure Laterality Date    APPENDECTOMY      CHOLECYSTECTOMY      HYSTERECTOMY      partial    LIPOMA RESECTION Left     LUNG LOBECTOMY Right     due to trauma       Social History  Ms.  reports that she has never smoked. She has never been exposed to tobacco smoke. She has never used smokeless tobacco. She reports that she does not drink alcohol and does not use drugs.    Family History  Ms.'s family history includes Arthritis in her sister; Asthma in her father; Atrial fibrillation in her sister; Brain cancer in her brother; COPD in her brother, brother, and sister; Cancer in her brother and brother; Cervical cancer in her daughter; Heart disease in her brother and mother; Hypertension in her mother; Lung cancer in her brother; No Known Problems in her brother, brother, daughter, daughter, daughter, daughter, daughter, daughter, sister, sister, and sister; Osteoarthritis in her mother; Thyroid disease in her daughter.    Medications and Allergies     Medications  Outpatient Medications Marked as Taking for the 3/8/23 encounter (Office Visit) with Myranda Velásquez NP   Medication Sig Dispense Refill    blood sugar diagnostic Strp by Misc.(Non-Drug; Combo Route) route. CHECK BLOOD SUGAR ONE TIME DAILY AND AS NEEDED      diclofenac sodium (VOLTAREN) 1 % Gel Apply 2 g topically 2 (two) times daily.      fluticasone propionate (FLONASE) 50 mcg/actuation nasal spray USE 1 SPRAY IN EACH NOSTRIL 2 TIMES A DAY ((SHAKE WELL)) 48 g 2    multivitamin-iron-folic acid Tab Take 1 tablet by mouth once daily.      [DISCONTINUED]  ALPRAZolam (XANAX) 0.25 MG tablet Take 1 tablet (0.25 mg total) by mouth 2 (two) times daily as needed for Anxiety. 60 tablet 2    [DISCONTINUED] amLODIPine (NORVASC) 10 MG tablet Take 1 tablet (10 mg total) by mouth once daily. 90 tablet 1    [DISCONTINUED] carvediloL (COREG) 6.25 MG tablet Take 1 tablet (6.25 mg total) by mouth 2 (two) times daily with meals. 180 tablet 1    [DISCONTINUED] cloNIDine (CATAPRES) 0.2 MG tablet TAKE ONE-HALF TO ONE TAB TWICE DAILY 180 tablet 1    [DISCONTINUED] ferrous sulfate (IRON, FERROUS SULFATE,) 325 mg (65 mg iron) Tab tablet Take 1 tablet (325 mg total) by mouth once daily. 90 tablet 1    [DISCONTINUED] gabapentin (NEURONTIN) 100 MG capsule Take 1 capsule (100 mg total) by mouth 3 (three) times daily. 90 capsule 1    [DISCONTINUED] hydroCHLOROthiazide (HYDRODIURIL) 25 MG tablet Take 1 tablet (25 mg total) by mouth once daily. 90 tablet 1    [DISCONTINUED] magnesium chloride (MAG 64) 64 mg TbEC Take 1 tablet (64 mg total) by mouth 2 (two) times daily. 180 tablet 1    [DISCONTINUED] meclizine (ANTIVERT) 12.5 mg tablet TAKE 1 TABLET BY MOUTH 3 TIMES A DAY AS NEEDED FOR DIZZINESS (MAY CAUSE DROWSINESS) 90 tablet 2    [DISCONTINUED] metFORMIN (GLUCOPHAGE) 500 MG tablet Take 1 tablet (500 mg total) by mouth 2 (two) times a day. 180 tablet 1    [DISCONTINUED] omeprazole (PRILOSEC) 20 MG capsule Take 1 capsule (20 mg total) by mouth 2 (two) times a day. 180 capsule 1    [DISCONTINUED] triamcinolone acetonide 0.1% (KENALOG) 0.1 % cream Apply topically 2 (two) times daily. APPLY TO AFFECTED AREA TWO TIMES DAILY 15 g 0       Allergies  Review of patient's allergies indicates:   Allergen Reactions    Ace inhibitors Anaphylaxis    Penicillins Anaphylaxis    Bactrim [sulfamethoxazole-trimethoprim]     Biaxin [clarithromycin]     Ciprofloxacin     Iodinated contrast media     Iodine and iodide containing products Hives    Latex     Mobic [meloxicam]     Trimethoprim      Sulfa (sulfonamide antibiotics) Hives and Rash       Physical Examination     Vitals:    03/08/23 0933   BP: 120/74   Pulse: 85   Resp: 18   Temp: 98.3 °F (36.8 °C)     Physical Exam  Vitals and nursing note reviewed.   HENT:      Head: Normocephalic.      Right Ear: Tympanic membrane normal.      Left Ear: Tympanic membrane normal.      Nose: Nose normal.      Mouth/Throat:      Mouth: Mucous membranes are moist.      Pharynx: Oropharynx is clear. No posterior oropharyngeal erythema.   Eyes:      Conjunctiva/sclera: Conjunctivae normal.   Cardiovascular:      Rate and Rhythm: Normal rate and regular rhythm.      Pulses: Normal pulses.      Heart sounds: Normal heart sounds.   Pulmonary:      Effort: Pulmonary effort is normal.      Breath sounds: Normal breath sounds.   Abdominal:      General: Abdomen is flat. Bowel sounds are normal. There is no distension.      Palpations: Abdomen is soft.   Musculoskeletal:         General: No swelling or tenderness. Normal range of motion.      Cervical back: Normal range of motion.      Right lower leg: No edema.      Left lower leg: No edema.   Skin:     General: Skin is warm and dry.      Capillary Refill: Capillary refill takes less than 2 seconds.   Neurological:      Mental Status: She is alert. Mental status is at baseline.   Psychiatric:         Mood and Affect: Mood normal.         Behavior: Behavior normal.             Lab Results   Component Value Date    WBC 5.59 03/08/2023    HGB 11.9 (L) 03/08/2023    HCT 34.9 (L) 03/08/2023    MCV 94.6 03/08/2023     03/08/2023          Sodium   Date Value Ref Range Status   03/08/2023 133 (L) 136 - 145 mmol/L Final     Potassium   Date Value Ref Range Status   03/08/2023 4.1 3.5 - 5.1 mmol/L Final     Chloride   Date Value Ref Range Status   03/08/2023 96 (L) 98 - 107 mmol/L Final     CO2   Date Value Ref Range Status   03/08/2023 29 21 - 32 mmol/L Final     Glucose   Date Value Ref Range Status   03/08/2023 118 (H) 74 -  106 mg/dL Final     BUN   Date Value Ref Range Status   03/08/2023 9 7 - 18 mg/dL Final     Creatinine   Date Value Ref Range Status   03/08/2023 0.74 0.55 - 1.02 mg/dL Final     Calcium   Date Value Ref Range Status   03/08/2023 9.8 8.5 - 10.1 mg/dL Final     Total Protein   Date Value Ref Range Status   03/08/2023 8.0 6.4 - 8.2 g/dL Final     Albumin   Date Value Ref Range Status   03/08/2023 4.1 3.5 - 5.0 g/dL Final     Bilirubin, Total   Date Value Ref Range Status   03/08/2023 0.4 >0.0 - 1.2 mg/dL Final     Alk Phos   Date Value Ref Range Status   03/08/2023 114 55 - 142 U/L Final     AST   Date Value Ref Range Status   03/08/2023 10 (L) 15 - 37 U/L Final     ALT   Date Value Ref Range Status   03/08/2023 16 13 - 56 U/L Final     Anion Gap   Date Value Ref Range Status   03/08/2023 12 7 - 16 mmol/L Final     eGFR    Date Value Ref Range Status   09/22/2021 88 >=60 mL/min/1.73m² Final     eGFR   Date Value Ref Range Status   06/24/2022 82 >=60 mL/min/1.73m² Final      X-Ray Hand 3 View Left  Narrative: EXAMINATION:  XR HAND COMPLETE 3 VIEW LEFT    CLINICAL HISTORY:  .  Pain in left hand    TECHNIQUE:  AP, lateral, and oblique views left hand    COMPARISON:  No previous similar    FINDINGS:  There is no acute fracture or dislocation.  There is moderate osteophyte formation and degenerative joint space narrowing at the interphalangeal joint of the thumb.  There is some minimal scattered osteophyte formation of the interphalangeal joints otherwise.  There is some mild osteophyte formation in the radial aspect of the carpus.  No areas of active joint erosion are identified.  Osseous structures are fairly well mineralized.  Impression: No acute process.  Osteoarthritis    Electronically signed by: Jose Alfredo Power  Date:    01/11/2022  Time:    11:11     Procedures   Assessment and Plan (including Health Maintenance)      Problem List  Smart Sets  Document Outside HM   :    Plan:           Problem  List Items Addressed This Visit          Neuro    Diabetic mononeuropathy associated with type 2 diabetes mellitus    Current Assessment & Plan     Well controlled on Neurontin. Continue at current dosage.            Relevant Medications    gabapentin (NEURONTIN) 100 MG capsule    metFORMIN (GLUCOPHAGE) 500 MG tablet       Psychiatric    Anxiety    Current Assessment & Plan     Reports anxiety well controlled with current medications.  with no signs of abuse. No aberrant behavior noted.  UDS obtained at today's visit. Will continue at current dosage. Follow up in 3 months or as needed.            Relevant Medications    ALPRAZolam (XANAX) 0.25 MG tablet       Cardiac/Vascular    Essential hypertension, benign    Current Assessment & Plan     Blood pressure well controlled. Continue current medications. Follow up in 3 months or as needed.              Relevant Medications    amLODIPine (NORVASC) 10 MG tablet    carvediloL (COREG) 6.25 MG tablet    cloNIDine (CATAPRES) 0.2 MG tablet    hydroCHLOROthiazide (HYDRODIURIL) 25 MG tablet    magnesium chloride (MAG 64) 64 mg TbEC    Other Relevant Orders    CBC Auto Differential (Completed)    Comprehensive Metabolic Panel (Completed)       Oncology    Iron deficiency anemia    Current Assessment & Plan     CBC obtained. Continue Iron at current dosage. Increase Iron in diet. Follow up in 3 months or as needed.            Relevant Medications    ferrous sulfate (IRON, FERROUS SULFATE,) 325 mg (65 mg iron) Tab tablet       Endocrine    Diabetes mellitus, type 2    Current Assessment & Plan     HgbA1C obtained at today's visit. Goal is less than 7. Continue current meds and low carb/no concentrated sweets diet with increased exercise as tolerated. Will follow up with lab results. Patient to follow up in 3 months or as needed.              Relevant Medications    metFORMIN (GLUCOPHAGE) 500 MG tablet    Other Relevant Orders    Comprehensive Metabolic Panel (Completed)     Hemoglobin A1C (Completed)       GI    GERD (gastroesophageal reflux disease)    Current Assessment & Plan     Patient reports GERD well controlled on Omeprazole . Continue at current dosage. Follow up in 3 months or as needed.          Relevant Medications    omeprazole (PRILOSEC) 20 MG capsule       Palliative Care    Other long term (current) drug therapy - Primary    Relevant Orders    POCT Urine Drug Screen Presump (Completed)     Other Visit Diagnoses       Hyperlipidemia, unspecified hyperlipidemia type        Relevant Orders    Lipid Panel (Completed)    Dizziness        Relevant Medications    meclizine (ANTIVERT) 12.5 mg tablet            Health Maintenance Topics with due status: Not Due       Topic Last Completion Date    Eye Exam 06/13/2022    Foot Exam 09/22/2022    Lipid Panel 03/08/2023    Hemoglobin A1c 03/08/2023       Future Appointments   Date Time Provider Department Center   6/14/2023 10:00 AM [facility]   6/29/2023  9:00 AM [facility]        Health Maintenance Due   Topic Date Due    Pneumococcal Vaccines (Age 65+) (1 - PCV) Never done    DEXA Scan  Never done    Diabetes Urine Screening  06/24/2023        No follow-ups on file.     Signature:  Myranda Velásquez NP  [facility]  [address] [address] [place]  [address]    Date of encounter: 3/8/23

## 2023-05-26 ENCOUNTER — OFFICE VISIT (OUTPATIENT)
Dept: FAMILY MEDICINE | Facility: CLINIC | Age: 78
End: 2023-05-26
Payer: MEDICARE

## 2023-05-26 VITALS
HEIGHT: 64 IN | BODY MASS INDEX: 24.59 KG/M2 | TEMPERATURE: 98 F | OXYGEN SATURATION: 97 % | HEART RATE: 77 BPM | WEIGHT: 144 LBS | RESPIRATION RATE: 18 BRPM | SYSTOLIC BLOOD PRESSURE: 122 MMHG | DIASTOLIC BLOOD PRESSURE: 84 MMHG

## 2023-05-26 DIAGNOSIS — D50.9 IRON DEFICIENCY ANEMIA, UNSPECIFIED IRON DEFICIENCY ANEMIA TYPE: ICD-10-CM

## 2023-05-26 DIAGNOSIS — E11.41 DIABETIC MONONEUROPATHY ASSOCIATED WITH TYPE 2 DIABETES MELLITUS: ICD-10-CM

## 2023-05-26 DIAGNOSIS — I10 ESSENTIAL HYPERTENSION, BENIGN: ICD-10-CM

## 2023-05-26 DIAGNOSIS — F41.9 ANXIETY: ICD-10-CM

## 2023-05-26 PROCEDURE — 99214 PR OFFICE/OUTPT VISIT, EST, LEVL IV, 30-39 MIN: ICD-10-PCS | Mod: ,,,

## 2023-05-26 PROCEDURE — 99214 OFFICE O/P EST MOD 30 MIN: CPT | Mod: ,,,

## 2023-05-26 RX ORDER — CLONIDINE HYDROCHLORIDE 0.2 MG/1
.1-.2 TABLET ORAL 2 TIMES DAILY
Qty: 180 TABLET | Refills: 1 | Status: SHIPPED | OUTPATIENT
Start: 2023-05-26 | End: 2023-10-24 | Stop reason: SDUPTHER

## 2023-05-26 RX ORDER — FERROUS SULFATE 325(65) MG
325 TABLET ORAL DAILY
Qty: 90 TABLET | Refills: 1 | Status: SHIPPED | OUTPATIENT
Start: 2023-05-26 | End: 2023-07-24 | Stop reason: SDUPTHER

## 2023-05-26 RX ORDER — HYDROCHLOROTHIAZIDE 25 MG/1
25 TABLET ORAL DAILY
Qty: 90 TABLET | Refills: 1 | Status: SHIPPED | OUTPATIENT
Start: 2023-05-26 | End: 2023-10-24 | Stop reason: SDUPTHER

## 2023-05-26 RX ORDER — GABAPENTIN 100 MG/1
100 CAPSULE ORAL 3 TIMES DAILY
Qty: 270 CAPSULE | Refills: 1 | Status: SHIPPED | OUTPATIENT
Start: 2023-05-26 | End: 2023-10-24 | Stop reason: SDUPTHER

## 2023-05-26 RX ORDER — TRIAMCINOLONE ACETONIDE 1 MG/G
CREAM TOPICAL 2 TIMES DAILY
Qty: 15 G | Refills: 0 | Status: SHIPPED | OUTPATIENT
Start: 2023-05-26 | End: 2023-10-24 | Stop reason: SDUPTHER

## 2023-05-26 RX ORDER — ALPRAZOLAM 0.25 MG/1
0.25 TABLET ORAL 2 TIMES DAILY PRN
Qty: 60 TABLET | Refills: 0 | Status: SHIPPED | OUTPATIENT
Start: 2023-05-26 | End: 2023-07-24 | Stop reason: SDUPTHER

## 2023-05-26 RX ORDER — CARVEDILOL 6.25 MG/1
6.25 TABLET ORAL 2 TIMES DAILY WITH MEALS
Qty: 180 TABLET | Refills: 1 | Status: SHIPPED | OUTPATIENT
Start: 2023-05-26 | End: 2023-10-24 | Stop reason: SDUPTHER

## 2023-05-26 RX ORDER — AMLODIPINE BESYLATE 10 MG/1
10 TABLET ORAL DAILY
Qty: 90 TABLET | Refills: 1 | Status: SHIPPED | OUTPATIENT
Start: 2023-05-26 | End: 2023-10-24 | Stop reason: SDUPTHER

## 2023-05-26 NOTE — ASSESSMENT & PLAN NOTE
Medications refilled today. Blood pressure well controlled. Continue current medications. Follow up in 3 months or as needed.

## 2023-05-26 NOTE — ASSESSMENT & PLAN NOTE
Reports anxiety well controlled with current medications.  with no signs of abuse. No aberrant behavior noted.  UDS obtained at today's visit. Will continue at current dosage. Follow up in 3 months or as needed

## 2023-05-26 NOTE — ASSESSMENT & PLAN NOTE
Ferrous sulfate refilled today. Continue Iron at current dosage. Increase Iron in diet. Follow up in 3 months or as needed.

## 2023-05-26 NOTE — PROGRESS NOTES
WHIT DOUGLAS   [address]  [place]  [address]      PATIENT NAME: Yana Oates  : 1945  DATE: 23  MRN: [id number]      Billing Provider: WHIT DOUGLAS  Level of Service:   Patient PCP Information       Provider PCP Type    Myranda Velásquez NP General            Reason for Visit / Chief Complaint: Anxiety (Routine visit for xanax refill. ) and Anemia (Routine visit for refill, Pt states that she increased her ferrous sulfate to 2 tablets daily and wants to have the Rx written this way. )         History of Present Illness / Problem Focused Workflow     Yana Oates presents to the clinic with Anxiety (Routine visit for xanax refill. ) and Anemia (Routine visit for refill, Pt states that she increased her ferrous sulfate to 2 tablets daily and wants to have the Rx written this way. )     79 y/o female presents to clinic for medication refill. Pt states symptoms are well controlled with current medication. Pt denies any chest pain, SOB, cough, palpitations, vision or hearing changes, fever, chills. Pt denies any concerns or questions at present.     Anxiety  Patient reports no chest pain, dizziness, nausea, palpitations, shortness of breath or suicidal ideas.     Her past medical history is significant for anemia.   Anemia  There has been no abdominal pain, fever, light-headedness or palpitations.     Review of Systems     Review of Systems   Constitutional:  Negative for chills, fatigue, fever and unexpected weight change.   HENT:  Negative for nasal congestion, ear pain, sinus pressure/congestion and sore throat.    Respiratory:  Negative for cough, chest tightness, shortness of breath and wheezing.    Cardiovascular:  Negative for chest pain and palpitations.   Gastrointestinal:  Negative for abdominal pain, change in bowel habit, nausea, vomiting and change in bowel habit.   Genitourinary:  Negative for difficulty urinating, dysuria, flank pain,  frequency, hematuria and urgency.   Musculoskeletal:  Negative for back pain, gait problem and neck pain.   Neurological:  Negative for dizziness, vertigo, seizures, weakness, light-headedness, numbness and headaches.   Psychiatric/Behavioral:  Negative for suicidal ideas.      Medical / Social / Family History     Past Medical History:   Diagnosis Date    Anxiety     Diabetes mellitus, type 2     Essential hypertension, benign     Fatigue     GERD (gastroesophageal reflux disease)     Low back pain     Osteoarthritis     Other long term (current) drug therapy     Strain of muscle and tendon of back wall of thorax, initial encounter        Past Surgical History:   Procedure Laterality Date    APPENDECTOMY      CHOLECYSTECTOMY      HYSTERECTOMY      partial    LIPOMA RESECTION Left     LUNG LOBECTOMY Right     due to trauma       Social History  Ms.  reports that she has never smoked. She has never been exposed to tobacco smoke. She has never used smokeless tobacco. She reports that she does not drink alcohol and does not use drugs.    Family History  Ms.'s family history includes Arthritis in her sister; Asthma in her father; Atrial fibrillation in her sister; Brain cancer in her brother; COPD in her brother, brother, and sister; Cancer in her brother and brother; Cervical cancer in her daughter; Heart disease in her brother and mother; Hypertension in her mother; Lung cancer in her brother; No Known Problems in her brother, brother, daughter, daughter, daughter, daughter, daughter, daughter, sister, sister, and sister; Osteoarthritis in her mother; Thyroid disease in her daughter.    Medications and Allergies     Medications  Outpatient Medications Marked as Taking for the 5/26/23 encounter (Office Visit) with WHIT Melendrez   Medication Sig Dispense Refill    blood sugar diagnostic Strp by Misc.(Non-Drug; Combo Route) route. CHECK BLOOD SUGAR ONE TIME DAILY AND AS NEEDED      fluticasone propionate  (FLONASE) 50 mcg/actuation nasal spray USE 1 SPRAY IN EACH NOSTRIL 2 TIMES A DAY ((SHAKE WELL)) 48 g 2    meclizine (ANTIVERT) 12.5 mg tablet TAKE 1 TABLET BY MOUTH 3 TIMES A DAY AS NEEDED FOR DIZZINESS (MAY CAUSE DROWSINESS) 90 tablet 2    metFORMIN (GLUCOPHAGE) 500 MG tablet Take 1 tablet (500 mg total) by mouth 2 (two) times a day. 180 tablet 1    multivitamin-iron-folic acid Tab Take 1 tablet by mouth once daily.      omeprazole (PRILOSEC) 20 MG capsule Take 1 capsule (20 mg total) by mouth 2 (two) times a day. 180 capsule 1    [DISCONTINUED] ALPRAZolam (XANAX) 0.25 MG tablet Take 1 tablet (0.25 mg total) by mouth 2 (two) times daily as needed for Anxiety. 60 tablet 0    [DISCONTINUED] amLODIPine (NORVASC) 10 MG tablet Take 1 tablet (10 mg total) by mouth once daily. 90 tablet 1    [DISCONTINUED] carvediloL (COREG) 6.25 MG tablet Take 1 tablet (6.25 mg total) by mouth 2 (two) times daily with meals. 180 tablet 1    [DISCONTINUED] cloNIDine (CATAPRES) 0.2 MG tablet TAKE ONE-HALF TO ONE TAB TWICE DAILY 180 tablet 1    [DISCONTINUED] ferrous sulfate (IRON, FERROUS SULFATE,) 325 mg (65 mg iron) Tab tablet Take 1 tablet (325 mg total) by mouth once daily. (Patient taking differently: Take 650 mg by mouth once daily.) 90 tablet 1    [DISCONTINUED] gabapentin (NEURONTIN) 100 MG capsule Take 1 capsule (100 mg total) by mouth 3 (three) times daily. 90 capsule 1    [DISCONTINUED] hydroCHLOROthiazide (HYDRODIURIL) 25 MG tablet Take 1 tablet (25 mg total) by mouth once daily. 90 tablet 1    [DISCONTINUED] magnesium chloride (MAG 64) 64 mg TbEC Take 1 tablet (64 mg total) by mouth 2 (two) times daily. 180 tablet 1    [DISCONTINUED] triamcinolone acetonide 0.1% (KENALOG) 0.1 % cream Apply topically 2 (two) times daily. APPLY TO AFFECTED AREA TWO TIMES DAILY 15 g 0       Allergies  Review of patient's allergies indicates:   Allergen Reactions    Ace inhibitors Anaphylaxis    Penicillins Anaphylaxis    Bactrim  [sulfamethoxazole-trimethoprim]     Biaxin [clarithromycin]     Ciprofloxacin     Iodinated contrast media     Iodine and iodide containing products Hives    Latex     Mobic [meloxicam]     Trimethoprim     Sulfa (sulfonamide antibiotics) Hives and Rash       Physical Examination     Vitals:    05/26/23 0822   BP: 122/84   Pulse: 77   Resp: 18   Temp: 97.8 °F (36.6 °C)     Physical Exam  Vitals and nursing note reviewed.   Constitutional:       General: She is not in acute distress.     Appearance: Normal appearance.   HENT:      Head: Normocephalic.   Neck:      Thyroid: No thyroid mass or thyroid tenderness.      Vascular: No carotid bruit.   Cardiovascular:      Rate and Rhythm: Normal rate and regular rhythm.      Heart sounds: Normal heart sounds, S1 normal and S2 normal. No murmur heard.    No friction rub. No gallop.   Pulmonary:      Effort: Pulmonary effort is normal. No respiratory distress.      Breath sounds: Normal breath sounds. No decreased breath sounds, wheezing, rhonchi or rales.   Musculoskeletal:         General: No swelling or tenderness. Normal range of motion.      Cervical back: Normal range of motion and neck supple.   Lymphadenopathy:      Cervical: No cervical adenopathy.   Skin:     General: Skin is warm and dry.      Capillary Refill: Capillary refill takes less than 2 seconds.   Neurological:      Mental Status: She is alert and oriented to person, place, and time.   Psychiatric:         Attention and Perception: Attention normal.         Mood and Affect: Mood normal.         Behavior: Behavior normal. Behavior is cooperative.             Lab Results   Component Value Date    WBC 5.59 03/08/2023    HGB 11.9 (L) 03/08/2023    HCT 34.9 (L) 03/08/2023    MCV 94.6 03/08/2023     03/08/2023        CMP  Sodium   Date Value Ref Range Status   03/08/2023 133 (L) 136 - 145 mmol/L Final     Potassium   Date Value Ref Range Status   03/08/2023 4.1 3.5 - 5.1 mmol/L Final     Chloride   Date  Value Ref Range Status   03/08/2023 96 (L) 98 - 107 mmol/L Final     CO2   Date Value Ref Range Status   03/08/2023 29 21 - 32 mmol/L Final     Glucose   Date Value Ref Range Status   03/08/2023 118 (H) 74 - 106 mg/dL Final     BUN   Date Value Ref Range Status   03/08/2023 9 7 - 18 mg/dL Final     Creatinine   Date Value Ref Range Status   03/08/2023 0.74 0.55 - 1.02 mg/dL Final     Calcium   Date Value Ref Range Status   03/08/2023 9.8 8.5 - 10.1 mg/dL Final     Total Protein   Date Value Ref Range Status   03/08/2023 8.0 6.4 - 8.2 g/dL Final     Albumin   Date Value Ref Range Status   03/08/2023 4.1 3.5 - 5.0 g/dL Final     Bilirubin, Total   Date Value Ref Range Status   03/08/2023 0.4 >0.0 - 1.2 mg/dL Final     Alk Phos   Date Value Ref Range Status   03/08/2023 114 55 - 142 U/L Final     AST   Date Value Ref Range Status   03/08/2023 10 (L) 15 - 37 U/L Final     ALT   Date Value Ref Range Status   03/08/2023 16 13 - 56 U/L Final     Anion Gap   Date Value Ref Range Status   03/08/2023 12 7 - 16 mmol/L Final     eGFR   Date Value Ref Range Status   03/08/2023 82 >=60 mL/min/1.73m² Final     Procedures   Assessment and Plan (including Health Maintenance)   :    Plan: Keep next scheduled appointment. RTC  sooner with any concerns, questions or new symptoms.           Problem List Items Addressed This Visit          Neuro    Diabetic mononeuropathy associated with type 2 diabetes mellitus    Current Assessment & Plan     Neurontin refilled. Well controlled on medication. Continue at current dose.           Relevant Medications    gabapentin (NEURONTIN) 100 MG capsule       Psychiatric    Anxiety    Current Assessment & Plan     Reports anxiety well controlled with current medications.  with no signs of abuse. No aberrant behavior noted.  UDS obtained at today's visit. Will continue at current dosage. Follow up in 3 months or as needed           Relevant Medications    ALPRAZolam (XANAX) 0.25 MG tablet        Cardiac/Vascular    Essential hypertension, benign    Current Assessment & Plan     Medications refilled today. Blood pressure well controlled. Continue current medications. Follow up in 3 months or as needed.            Relevant Medications    hydroCHLOROthiazide (HYDRODIURIL) 25 MG tablet    carvediloL (COREG) 6.25 MG tablet    amLODIPine (NORVASC) 10 MG tablet    magnesium chloride (MAG 64) 64 mg TbEC    cloNIDine (CATAPRES) 0.2 MG tablet       Oncology    Iron deficiency anemia    Current Assessment & Plan     Ferrous sulfate refilled today. Continue Iron at current dosage. Increase Iron in diet. Follow up in 3 months or as needed.            Relevant Medications    ferrous sulfate (IRON, FERROUS SULFATE,) 325 mg (65 mg iron) Tab tablet       Health Maintenance Topics with due status: Not Due       Topic Last Completion Date    Foot Exam 09/22/2022    Lipid Panel 03/08/2023    Hemoglobin A1c 03/08/2023       Future Appointments   Date Time Provider Department Center   8/1/2023 10:00 AM Myranda Velásquez NP [facility]   12/7/2023  1:00 PM AWV NURSE [facility]        Health Maintenance Due   Topic Date Due    Pneumococcal Vaccines (Age 65+) (1 - PCV) Never done    DEXA Scan  Never done    Eye Exam  06/13/2023    Diabetes Urine Screening  06/24/2023        No follow-ups on file.     Signature:  WHIT DOUGLAS  [facility]  [id number] [place] [address], [place]  [address]    Date of encounter: 5/26/23

## 2023-07-24 ENCOUNTER — OFFICE VISIT (OUTPATIENT)
Dept: FAMILY MEDICINE | Facility: CLINIC | Age: 78
End: 2023-07-24
Payer: MEDICARE

## 2023-07-24 VITALS
OXYGEN SATURATION: 99 % | DIASTOLIC BLOOD PRESSURE: 80 MMHG | SYSTOLIC BLOOD PRESSURE: 130 MMHG | HEART RATE: 88 BPM | HEIGHT: 64 IN | TEMPERATURE: 99 F | BODY MASS INDEX: 24.59 KG/M2 | RESPIRATION RATE: 20 BRPM | WEIGHT: 144 LBS

## 2023-07-24 DIAGNOSIS — K21.9 GASTROESOPHAGEAL REFLUX DISEASE, UNSPECIFIED WHETHER ESOPHAGITIS PRESENT: ICD-10-CM

## 2023-07-24 DIAGNOSIS — E11.9 ENCOUNTER FOR DIABETIC FOOT EXAM: ICD-10-CM

## 2023-07-24 DIAGNOSIS — T78.40XD ALLERGY, SUBSEQUENT ENCOUNTER: ICD-10-CM

## 2023-07-24 DIAGNOSIS — Z13.820 SCREENING FOR OSTEOPOROSIS: ICD-10-CM

## 2023-07-24 DIAGNOSIS — D50.9 IRON DEFICIENCY ANEMIA, UNSPECIFIED IRON DEFICIENCY ANEMIA TYPE: ICD-10-CM

## 2023-07-24 DIAGNOSIS — F41.9 ANXIETY: Primary | ICD-10-CM

## 2023-07-24 DIAGNOSIS — Z53.20 PATIENT REFUSED EVALUATION OR TREATMENT: ICD-10-CM

## 2023-07-24 DIAGNOSIS — E11.9 TYPE 2 DIABETES MELLITUS WITHOUT COMPLICATION, WITHOUT LONG-TERM CURRENT USE OF INSULIN: ICD-10-CM

## 2023-07-24 DIAGNOSIS — E78.5 HYPERLIPIDEMIA, UNSPECIFIED HYPERLIPIDEMIA TYPE: ICD-10-CM

## 2023-07-24 LAB
CREAT UR-MCNC: 36 MG/DL (ref 28–219)
MICROALBUMIN UR-MCNC: 5.7 MG/DL (ref 0–2.8)
MICROALBUMIN/CREAT RATIO PNL UR: 158.3 MG/G (ref 0–30)

## 2023-07-24 PROCEDURE — 82570 MICROALBUMIN / CREATININE RATIO URINE: ICD-10-PCS | Mod: ,,, | Performed by: CLINICAL MEDICAL LABORATORY

## 2023-07-24 PROCEDURE — 82043 MICROALBUMIN / CREATININE RATIO URINE: ICD-10-PCS | Mod: ,,, | Performed by: CLINICAL MEDICAL LABORATORY

## 2023-07-24 PROCEDURE — 99213 PR OFFICE/OUTPT VISIT, EST, LEVL III, 20-29 MIN: ICD-10-PCS | Mod: ,,, | Performed by: FAMILY MEDICINE

## 2023-07-24 PROCEDURE — 82043 UR ALBUMIN QUANTITATIVE: CPT | Mod: ,,, | Performed by: CLINICAL MEDICAL LABORATORY

## 2023-07-24 PROCEDURE — 82570 ASSAY OF URINE CREATININE: CPT | Mod: ,,, | Performed by: CLINICAL MEDICAL LABORATORY

## 2023-07-24 PROCEDURE — 99213 OFFICE O/P EST LOW 20 MIN: CPT | Mod: ,,, | Performed by: FAMILY MEDICINE

## 2023-07-24 RX ORDER — FLUTICASONE PROPIONATE 50 MCG
SPRAY, SUSPENSION (ML) NASAL
Qty: 48 G | Refills: 3 | Status: SHIPPED | OUTPATIENT
Start: 2023-07-24

## 2023-07-24 RX ORDER — OMEPRAZOLE 20 MG/1
20 CAPSULE, DELAYED RELEASE ORAL 2 TIMES DAILY
Qty: 180 CAPSULE | Refills: 1 | Status: SHIPPED | OUTPATIENT
Start: 2023-07-24 | End: 2024-01-18 | Stop reason: SDUPTHER

## 2023-07-24 RX ORDER — ALPRAZOLAM 0.25 MG/1
0.25 TABLET ORAL 2 TIMES DAILY PRN
Qty: 60 TABLET | Refills: 0 | Status: SHIPPED | OUTPATIENT
Start: 2023-07-24 | End: 2023-07-24

## 2023-07-24 RX ORDER — FERROUS SULFATE 325(65) MG
325 TABLET ORAL 2 TIMES DAILY
Qty: 180 TABLET | Refills: 3 | Status: SHIPPED | OUTPATIENT
Start: 2023-07-24 | End: 2024-07-23

## 2023-07-24 RX ORDER — ALPRAZOLAM 0.25 MG/1
0.25 TABLET ORAL 2 TIMES DAILY PRN
Qty: 60 TABLET | Refills: 2 | Status: SHIPPED | OUTPATIENT
Start: 2023-07-24 | End: 2023-10-24 | Stop reason: SDUPTHER

## 2023-07-24 RX ORDER — METFORMIN HYDROCHLORIDE 500 MG/1
500 TABLET ORAL 2 TIMES DAILY
Qty: 180 TABLET | Refills: 1 | Status: SHIPPED | OUTPATIENT
Start: 2023-07-24 | End: 2024-01-18 | Stop reason: SDUPTHER

## 2023-07-24 NOTE — ASSESSMENT & PLAN NOTE
Diabetic foot exam was done  Diabetic urine screening pending  Patient had an eye exam a week ago  Patient does not want to give blood today. Will defere A1c for next visit.

## 2023-07-24 NOTE — ASSESSMENT & PLAN NOTE
Elevated cholesterol levels was seen on previous lab exams  Patient >75 with no CAD, Would not benefit from lipitor at this point.  Will monitor the HLD and act accordingly.

## 2023-07-24 NOTE — ASSESSMENT & PLAN NOTE
History     Chief Complaint   Patient presents with     Leg Swelling     HPI  Elaina Lin is a 35 year old female who I saw in the ER last week and had to heavily sedate with Ativan, Haldol, and then ketamine in order to complete testing for her presentation of dyskinesia.  The patient was eventually admitted to the Medicine Service on the [place]. She was seen by Neurology and was thought to have a serotonin reaction to methadone that had recently been prescribed.  The patient has a history of opioid dependence and was discharged home within the past 4 days.  Since that time, the patient has had some problems with bowel movements for which she was placed on senna and enemas.  Patient states her last enema was 4 days ago.  Patient states that she has now developed some nausea and vomiting with a productive cough.  She has had no fevers, no shortness of breath, but complains of diffuse abdominal pain and bilateral leg swelling.  The patient presents here to the ER for evaluation.    This part of the medical record was transcribed by Roslyn Grady Scribe, from a dictation done by Ciaran Pressley MD.     PAST MEDICAL HISTORY  Past Medical History:   Diagnosis Date     Genital herpes 2012     IMO update changed this record. Please review for accuracy     H/O: substance abuse 2013     Postpartum depression 10/21/2011     Recur major depress, severe 2012     Substance abuse      PAST SURGICAL HISTORY  Past Surgical History:   Procedure Laterality Date      SECTION  2011 and 13     HERNIA REPAIR       REPAIR TENDON HAND       FAMILY HISTORY  Family History   Problem Relation Age of Onset     Asthma Mother      DIABETES Mother      Allergies Mother      Psychotic Disorder Mother      schitzophrenic     DIABETES Maternal Grandmother      HEART DISEASE Maternal Grandmother      triple bypass     Eye Disorder Maternal Grandmother      cataracts     Hypertension Maternal  Controlled with current regimen  Continue with Omeprazole 20 mg BID   Grandmother      Unknown/Adopted Maternal Grandfather      Unknown/Adopted Paternal Grandmother      Unknown/Adopted Paternal Grandfather      CANCER Sister      leukemia     SOCIAL HISTORY  Social History   Substance Use Topics     Smoking status: Current Every Day Smoker     Packs/day: 1.00     Types: Cigarettes     Smokeless tobacco: Never Used      Comment: about 1 cigarettes per day     Alcohol use Yes      Comment: 1 liter vodka per day     MEDICATIONS  Previous Medications    ALBUTEROL (PROAIR HFA/PROVENTIL HFA/VENTOLIN HFA) 108 (90 BASE) MCG/ACT INHALER    Inhale 2 puffs into the lungs 4 times daily as needed for other (dyspnea)    AMLODIPINE (NORVASC) 5 MG TABLET    Take 1 tablet (5 mg) by mouth daily    DIPHENHYDRAMINE (BENADRYL) 25 MG CAPSULE    Take 1-2 capsules (25-50 mg) by mouth every 6 hours as needed for itching    DIVALPROEX SODIUM EXTENDED-RELEASE (DEPAKOTE ER) 250 MG 24 HR TABLET    Take 5 tablets (1,250 mg) by mouth At Bedtime    EPINEPHRINE (EPIPEN/ADRENACLICK/OR ANY BX GENERIC EQUIV) 0.3 MG/0.3ML INJECTION 2-PACK    Inject 0.3 mLs (0.3 mg) into the muscle once as needed for anaphylaxis    FERROUS SULFATE (IRON) 325 (65 FE) MG TABLET    Take 1 tablet (325 mg) by mouth daily (with breakfast)    METHADONE (DOLPHINE) 5 MG/5ML SOLUTION    Take 40 mg by mouth daily    NALOXONE (NARCAN) 1 MG/ML FOR INTRANASAL KIT (2 SYRINGES WITH 2 MUCOSAL ATOMIZER DEVICE)    In opioid overdose put cone in nostril and push 1/2 of contents into each nostril.  Repeat every 3 min if no response until help arrives.    ONDANSETRON (ZOFRAN-ODT) 4 MG ODT TAB    Take 1 tablet (4 mg) by mouth every 6 hours as needed for nausea or vomiting    QUETIAPINE (SEROQUEL) 25 MG TABLET    Take 1 tablet (25 mg) by mouth daily    SENNA-DOCUSATE (SENOKOT-S;PERICOLACE) 8.6-50 MG PER TABLET    Take 1 tablet by mouth 2 times daily as needed for constipation     ALLERGIES  Allergies   Allergen Reactions     Tomato Anaphylaxis     Compazine  "[Prochlorperazine]          I have reviewed the Medications, Allergies, Past Medical and Surgical History, and Social History in the Epic system.    Review of Systems   All other systems reviewed and are negative.      Physical Exam   BP: 118/87  Pulse: 75  Heart Rate: 78  Temp: 98  F (36.7  C)  Resp: 16  Height: 157.5 cm (5' 2\")  Weight: 69.9 kg (154 lb)  SpO2: 100 %      Physical Exam   Constitutional: She is oriented to person, place, and time.   [place] dramatic spitting into a vomit bag and somewhat hyperactive   HENT:   Head: Atraumatic.   Eyes: EOM are normal. Pupils are equal, round, and reactive to light.   Neck: Neck supple.   Cardiovascular: Normal heart sounds.    Pulmonary/Chest: Breath sounds normal.   Abdominal: Soft.   Musculoskeletal: She exhibits no deformity.   Patient's lower extremities do have 1+ edema   Neurological: She is alert and oriented to person, place, and time.   Moving all extremities with equal strength   Skin: Skin is warm.   Psychiatric:   Somewhat hyper, somewhat vulgar, somewhat dogmatic       ED Course     ED Course     Procedures          IV established for blood draw.    Medications   lactulose (CHRONULAC) solution 20 g (not administered)   ondansetron (ZOFRAN) injection 8 mg (8 mg Intravenous Given 6/7/18 2002)   LORazepam (ATIVAN) injection 0.5 mg (0.5 mg Intravenous Given 6/7/18 5819)     Patient given Zofran and Ativan for her ongoing hyperactivity, spitting up, and coughing.      Results for orders placed or performed during the hospital encounter of 06/07/18   Chest XR,  PA & LAT    Narrative    Preliminary report:  This is a preliminary resident interpretation. Full report to follow.        Impression    IMPRESSION:   No acute cardiopulmonary abnormality.    XR Abdomen 2 Views    Narrative    PRELIMINARY REPORT - The following report is a preliminary  interpretation.      Impression    Impression:   1-Nonobstructive bowel gas pattern.  2-Moderate distal burden in the " colon.   CBC with platelets differential   Result Value Ref Range    WBC 10.2 4.0 - 11.0 10e9/L    RBC Count 3.79 (L) 3.8 - 5.2 10e12/L    Hemoglobin 10.6 (L) 11.7 - 15.7 g/dL    Hematocrit 33.3 (L) 35.0 - 47.0 %    MCV 88 78 - 100 fl    MCH 28.0 26.5 - 33.0 pg    MCHC 31.8 31.5 - 36.5 g/dL    RDW 16.8 (H) 10.0 - 15.0 %    Platelet Count 322 150 - 450 10e9/L    Diff Method Automated Method     % Neutrophils 63.7 %    % Lymphocytes 27.2 %    % Monocytes 7.8 %    % Eosinophils 1.0 %    % Basophils 0.2 %    % Immature Granulocytes 0.1 %    Nucleated RBCs 0 0 /100    Absolute Neutrophil 6.5 1.6 - 8.3 10e9/L    Absolute Lymphocytes 2.8 0.8 - 5.3 10e9/L    Absolute Monocytes 0.8 0.0 - 1.3 10e9/L    Absolute Eosinophils 0.1 0.0 - 0.7 10e9/L    Absolute Basophils 0.0 0.0 - 0.2 10e9/L    Abs Immature Granulocytes 0.0 0 - 0.4 10e9/L    Absolute Nucleated RBC 0.0    Comprehensive metabolic panel   Result Value Ref Range    Sodium 139 133 - 144 mmol/L    Potassium 3.7 3.4 - 5.3 mmol/L    Chloride 107 94 - 109 mmol/L    Carbon Dioxide 26 20 - 32 mmol/L    Anion Gap 10 3 - 14 mmol/L    Glucose 96 70 - 99 mg/dL    Urea Nitrogen 6 (L) 7 - 30 mg/dL    Creatinine 0.67 0.52 - 1.04 mg/dL    GFR Estimate >90 >60 mL/min/1.7m2    GFR Estimate If Black >90 >60 mL/min/1.7m2    Calcium 8.0 (L) 8.5 - 10.1 mg/dL    Bilirubin Total 0.1 (L) 0.2 - 1.3 mg/dL    Albumin 2.6 (L) 3.4 - 5.0 g/dL    Protein Total 6.0 (L) 6.8 - 8.8 g/dL    Alkaline Phosphatase 52 40 - 150 U/L    ALT 19 0 - 50 U/L    AST 23 0 - 45 U/L   Lipase   Result Value Ref Range    Lipase 257 73 - 393 U/L   NT proBNP inpatient and ED   Result Value Ref Range    N-Terminal Pro BNP Inpatient 60 0 - 450 pg/mL       Labs Ordered and Resulted from Time of ED Arrival Up to the Time of Departure from the ED   CBC WITH PLATELETS DIFFERENTIAL - Abnormal; Notable for the following:        Result Value    RBC Count 3.79 (*)     Hemoglobin 10.6 (*)     Hematocrit 33.3 (*)     RDW 16.8 (*)      All other components within normal limits   COMPREHENSIVE METABOLIC PANEL - Abnormal; Notable for the following:     Urea Nitrogen 6 (*)     Calcium 8.0 (*)     Bilirubin Total 0.1 (*)     Albumin 2.6 (*)     Protein Total 6.0 (*)     All other components within normal limits   LIPASE   NT PROBNP INPATIENT   UA MACROSCOPIC WITH REFLEX TO MICRO AND CULTURE   PERIPHERAL IV CATHETER       Assessments & Plan (with Medical Decision Making)     I have reviewed the nursing notes.    Patient became somewhat sedated with the Zofran and Ativan used.  Patient was easily arousable however by touch.  Patient's leg edema is most likely secondary to her low protein and this was discussed with the patient and the patient's .  The patient's abdominal pain and vomiting is most likely related to the patient's constipation.   states the patient has not been taking her senna and that while it was discussed that she stopped the methadone during her last hospitalization, it sounds like the patient has been going to the methadone clinic anyway and has restarted it.  At this time the patient will be given a dose of lactulose here and then be kept on some lactulose and is to restart her senna as directed.    I have reviewed the findings, diagnosis, plan and need for follow up with the patient and .    New Prescriptions    LACTULOSE 20 GM/30ML SOLN    Take 30 mLs by mouth 3 times daily for 5 days       Final diagnoses:   Bilateral lower extremity edema - with hypoalbuminemia   Constipation, unspecified constipation type     Elevate your legs above your chest as much as possible over the next 48 hours.    Full liquid diet for the next 2 days.  May advance diet to increased protein when you start having bowel movements.    Restart your senna after taking lactulose for 5 days.    Use your Zofran as needed for nausea.    Please make an appointment to follow up with Your Primary Care Provider or our [facility] (phone: [phone]) in one week for recheck.      Routine discharge instructions were given for these diagnoses and directions.    Ciaran Pressley MD    6/7/2018   [facility], [place], EMERGENCY DEPARTMENT     Ciaran Pressley MD  06/08/18 0125

## 2023-07-24 NOTE — ASSESSMENT & PLAN NOTE
Patient has increased her daily iron to BID and she reports feeling a lot better. Continue current treatment

## 2023-07-24 NOTE — PROGRESS NOTES
Subjective     Patient ID: Yana Oates is a 78 y.o. female.    Chief Complaint: Anxiety (Patient is requesting refill on xanax. She has to take it for her anxiety. ) and Diabetes (Patient needs refill son medications. Her blood sugar last night was 137.)    78 year old female with PMHx of HTN, DM2 with last A1c of 6.8 in march this year, and anxiety came in for a follow up visit and medication refill. Patient's BP is 130/80 and she reports it is about the same at home. Patient reports she is compliant with all her medications and has no new complaints.   Patient has had her eye exam a week ago. Patient reports she does not want to get the pneumococcal vaccine and declined DXA bone exam. She reports she does not want to worry about the results of DXA exam in case if there is signs of osteoprosis. She is a non smoker and a non drinker. She reports feeling happy and no symptoms of depression.   Review of Systems   Constitutional:  Negative for chills and fever.   HENT:  Negative for sore throat and trouble swallowing.    Respiratory:  Negative for chest tightness, shortness of breath and wheezing.    Cardiovascular:  Negative for chest pain, palpitations, leg swelling and claudication.   Gastrointestinal:  Negative for abdominal distention, abdominal pain, constipation, diarrhea, nausea and vomiting.   Genitourinary:  Negative for bladder incontinence.   Neurological:  Negative for tremors and weakness.   Psychiatric/Behavioral:  Negative for agitation and behavioral problems.         Objective   Vitals:    07/24/23 0823   BP: 130/80   Pulse: 88   Resp: 20   Temp: 98.6 °F (37 °C)       Physical Exam  Vitals and nursing note reviewed.   Constitutional:       General: She is not in acute distress.     Appearance: Normal appearance. She is normal weight. She is not ill-appearing or toxic-appearing.   HENT:      Head: Normocephalic and atraumatic.      Right Ear: External ear normal.      Left Ear: External ear normal.       Nose: Nose normal. No congestion or rhinorrhea.      Mouth/Throat:      Mouth: Mucous membranes are moist.   Eyes:      Conjunctiva/sclera: Conjunctivae normal.   Neck:      Vascular: No carotid bruit.   Cardiovascular:      Rate and Rhythm: Normal rate and regular rhythm.      Pulses: Normal pulses.      Heart sounds: Normal heart sounds. No murmur heard.    No friction rub.   Pulmonary:      Effort: Pulmonary effort is normal. No respiratory distress.      Breath sounds: Normal breath sounds. No wheezing or rales.   Abdominal:      General: Abdomen is flat. Bowel sounds are normal. There is no distension.      Palpations: Abdomen is soft.      Tenderness: There is no abdominal tenderness.   Musculoskeletal:         General: No swelling or tenderness.      Cervical back: Neck supple. No tenderness.      Right lower leg: No edema.      Left lower leg: No edema.   Feet:      Comments: Foot exam was done with no abnormalities including sensation and motor function.   Skin:     General: Skin is warm.      Coloration: Skin is not jaundiced.      Findings: No bruising or lesion.   Neurological:      Mental Status: She is alert and oriented to person, place, and time.   Psychiatric:         Behavior: Behavior normal.        Assessment and Plan     1. Screening for osteoporosis    2. Anxiety  Comments:  Continue current medication. Denies diversion,  good, UDS consistent, no signs of aberrant behavior noted  Orders:  -     Discontinue: ALPRAZolam (XANAX) 0.25 MG tablet; Take 1 tablet (0.25 mg total) by mouth 2 (two) times daily as needed for Anxiety.  Dispense: 60 tablet; Refill: 0  -     ALPRAZolam (XANAX) 0.25 MG tablet; Take 1 tablet (0.25 mg total) by mouth 2 (two) times daily as needed for Anxiety.  Dispense: 60 tablet; Refill: 2    3. Gastroesophageal reflux disease, unspecified whether esophagitis present  Comments:  Continue current medication  Orders:  -     omeprazole (PRILOSEC) 20 MG capsule; Take 1  capsule (20 mg total) by mouth 2 (two) times a day.  Dispense: 180 capsule; Refill: 1    4. Type 2 diabetes mellitus without complication, without long-term current use of insulin  Comments:  Continue current medication and diet. Hgb A1c goal < 7.0  Orders:  -     metFORMIN (GLUCOPHAGE) 500 MG tablet; Take 1 tablet (500 mg total) by mouth 2 (two) times a day.  Dispense: 180 tablet; Refill: 1  -     Microalbumin/Creatinine Ratio, Urine    5. Allergy, subsequent encounter  -     fluticasone propionate (FLONASE) 50 mcg/actuation nasal spray; USE 1 SPRAY IN EACH NOSTRIL 2 TIMES A DAY ((SHAKE WELL))  Dispense: 48 g; Refill: 3    6. Iron deficiency anemia, unspecified iron deficiency anemia type  Comments:  Continue high protein diet and ferrous sulfate  Orders:  -     ferrous sulfate (IRON, FERROUS SULFATE,) 325 mg (65 mg iron) Tab tablet; Take 1 tablet (325 mg total) by mouth 2 (two) times daily.  Dispense: 180 tablet; Refill: 3        Anxiety  Comments:  Continue current medication. Denies diversion,  good, UDS consistent, no signs of aberrant behavior noted  Orders:  -     Discontinue: ALPRAZolam (XANAX) 0.25 MG tablet; Take 1 tablet (0.25 mg total) by mouth 2 (two) times daily as needed for Anxiety.  Dispense: 60 tablet; Refill: 0  -     ALPRAZolam (XANAX) 0.25 MG tablet; Take 1 tablet (0.25 mg total) by mouth 2 (two) times daily as needed for Anxiety.  Dispense: 60 tablet; Refill: 2    Gastroesophageal reflux disease, unspecified whether esophagitis present  Comments:  Continue current medication  Orders:  -     omeprazole (PRILOSEC) 20 MG capsule; Take 1 capsule (20 mg total) by mouth 2 (two) times a day.  Dispense: 180 capsule; Refill: 1    Type 2 diabetes mellitus without complication, without long-term current use of insulin  Comments:  Continue current medication and diet. Hgb A1c goal < 7.0  Orders:  -     metFORMIN (GLUCOPHAGE) 500 MG tablet; Take 1 tablet (500 mg total) by mouth 2 (two) times a day.   Dispense: 180 tablet; Refill: 1  -     Microalbumin/Creatinine Ratio, Urine    Allergy, subsequent encounter  -     fluticasone propionate (FLONASE) 50 mcg/actuation nasal spray; USE 1 SPRAY IN EACH NOSTRIL 2 TIMES A DAY ((SHAKE WELL))  Dispense: 48 g; Refill: 3    Iron deficiency anemia, unspecified iron deficiency anemia type  Comments:  Continue high protein diet and ferrous sulfate  Orders:  -     ferrous sulfate (IRON, FERROUS SULFATE,) 325 mg (65 mg iron) Tab tablet; Take 1 tablet (325 mg total) by mouth 2 (two) times daily.  Dispense: 180 tablet; Refill: 3    Screening for osteoporosis    Encounter for diabetic foot exam    Patient refused evaluation or treatment    Hyperlipidemia, unspecified hyperlipidemia type               No follow-ups on file.

## 2023-09-09 DIAGNOSIS — Z71.89 COMPLEX CARE COORDINATION: ICD-10-CM

## 2023-10-03 DIAGNOSIS — R42 DIZZINESS: ICD-10-CM

## 2023-10-03 RX ORDER — MECLIZINE HCL 12.5 MG 12.5 MG/1
TABLET ORAL
Qty: 90 TABLET | Refills: 2 | Status: SHIPPED | OUTPATIENT
Start: 2023-10-03 | End: 2024-01-18 | Stop reason: SDUPTHER

## 2023-10-23 PROBLEM — Z13.820 SCREENING FOR OSTEOPOROSIS: Status: RESOLVED | Noted: 2023-07-24 | Resolved: 2023-10-23

## 2023-10-24 ENCOUNTER — OFFICE VISIT (OUTPATIENT)
Dept: FAMILY MEDICINE | Facility: CLINIC | Age: 78
End: 2023-10-24
Payer: MEDICARE

## 2023-10-24 VITALS
TEMPERATURE: 98 F | SYSTOLIC BLOOD PRESSURE: 136 MMHG | HEIGHT: 64 IN | OXYGEN SATURATION: 99 % | WEIGHT: 145.38 LBS | DIASTOLIC BLOOD PRESSURE: 86 MMHG | BODY MASS INDEX: 24.82 KG/M2 | RESPIRATION RATE: 18 BRPM | HEART RATE: 94 BPM

## 2023-10-24 DIAGNOSIS — I10 ESSENTIAL HYPERTENSION, BENIGN: ICD-10-CM

## 2023-10-24 DIAGNOSIS — E11.9 TYPE 2 DIABETES MELLITUS WITHOUT COMPLICATION, WITHOUT LONG-TERM CURRENT USE OF INSULIN: ICD-10-CM

## 2023-10-24 DIAGNOSIS — E78.5 HYPERLIPIDEMIA, UNSPECIFIED HYPERLIPIDEMIA TYPE: ICD-10-CM

## 2023-10-24 DIAGNOSIS — Z79.899 OTHER LONG TERM (CURRENT) DRUG THERAPY: Primary | ICD-10-CM

## 2023-10-24 DIAGNOSIS — K21.9 GASTROESOPHAGEAL REFLUX DISEASE, UNSPECIFIED WHETHER ESOPHAGITIS PRESENT: ICD-10-CM

## 2023-10-24 DIAGNOSIS — F41.9 ANXIETY: ICD-10-CM

## 2023-10-24 DIAGNOSIS — E11.41 DIABETIC MONONEUROPATHY ASSOCIATED WITH TYPE 2 DIABETES MELLITUS: ICD-10-CM

## 2023-10-24 LAB
ALBUMIN SERPL BCP-MCNC: 4.1 G/DL (ref 3.5–5)
ALBUMIN/GLOB SERPL: 1 {RATIO}
ALP SERPL-CCNC: 119 U/L (ref 55–142)
ALT SERPL W P-5'-P-CCNC: 21 U/L (ref 13–56)
ANION GAP SERPL CALCULATED.3IONS-SCNC: 11 MMOL/L (ref 7–16)
AST SERPL W P-5'-P-CCNC: 14 U/L (ref 15–37)
BASOPHILS # BLD AUTO: 0.02 K/UL (ref 0–0.2)
BASOPHILS NFR BLD AUTO: 0.3 % (ref 0–1)
BILIRUB SERPL-MCNC: 0.4 MG/DL (ref ?–1.2)
BUN SERPL-MCNC: 7 MG/DL (ref 7–18)
BUN/CREAT SERPL: 9 (ref 6–20)
CALCIUM SERPL-MCNC: 9.9 MG/DL (ref 8.5–10.1)
CHLORIDE SERPL-SCNC: 95 MMOL/L (ref 98–107)
CHOLEST SERPL-MCNC: 233 MG/DL (ref 0–200)
CHOLEST/HDLC SERPL: 2.9 {RATIO}
CO2 SERPL-SCNC: 29 MMOL/L (ref 21–32)
CREAT SERPL-MCNC: 0.82 MG/DL (ref 0.55–1.02)
CTP QC/QA: YES
DIFFERENTIAL METHOD BLD: ABNORMAL
EGFR (NO RACE VARIABLE) (RUSH/TITUS): 72 ML/MIN/1.73M2
EOSINOPHIL # BLD AUTO: 0.47 K/UL (ref 0–0.5)
EOSINOPHIL NFR BLD AUTO: 7.2 % (ref 1–4)
ERYTHROCYTE [DISTWIDTH] IN BLOOD BY AUTOMATED COUNT: 12.1 % (ref 11.5–14.5)
EST. AVERAGE GLUCOSE BLD GHB EST-MCNC: 137 MG/DL
GLOBULIN SER-MCNC: 4.2 G/DL (ref 2–4)
GLUCOSE SERPL-MCNC: 98 MG/DL (ref 74–106)
HBA1C MFR BLD HPLC: 6.7 % (ref 4.5–6.6)
HCT VFR BLD AUTO: 37.1 % (ref 38–47)
HDLC SERPL-MCNC: 79 MG/DL (ref 40–60)
HGB BLD-MCNC: 12.9 G/DL (ref 12–16)
IMM GRANULOCYTES # BLD AUTO: 0.01 K/UL (ref 0–0.04)
IMM GRANULOCYTES NFR BLD: 0.2 % (ref 0–0.4)
LDLC SERPL CALC-MCNC: 129 MG/DL
LDLC/HDLC SERPL: 1.6 {RATIO}
LYMPHOCYTES # BLD AUTO: 2.91 K/UL (ref 1–4.8)
LYMPHOCYTES NFR BLD AUTO: 44.8 % (ref 27–41)
MCH RBC QN AUTO: 32.8 PG (ref 27–31)
MCHC RBC AUTO-ENTMCNC: 34.8 G/DL (ref 32–36)
MCV RBC AUTO: 94.4 FL (ref 80–96)
MONOCYTES # BLD AUTO: 0.54 K/UL (ref 0–0.8)
MONOCYTES NFR BLD AUTO: 8.3 % (ref 2–6)
MPC BLD CALC-MCNC: 11.2 FL (ref 9.4–12.4)
NEUTROPHILS # BLD AUTO: 2.54 K/UL (ref 1.8–7.7)
NEUTROPHILS NFR BLD AUTO: 39.2 % (ref 53–65)
NONHDLC SERPL-MCNC: 154 MG/DL
NRBC # BLD AUTO: 0 X10E3/UL
NRBC, AUTO (.00): 0 %
PLATELET # BLD AUTO: 314 K/UL (ref 150–400)
POC (AMP) AMPHETAMINE: NEGATIVE
POC (BAR) BARBITURATES: NEGATIVE
POC (BUP) BUPRENORPHINE: NEGATIVE
POC (BZO) BENZODIAZEPINES: NEGATIVE
POC (COC) COCAINE: NEGATIVE
POC (MDMA) METHYLENEDIOXYMETHAMPHETAMINE 3,4: NEGATIVE
POC (MET) METHAMPHETAMINE: NEGATIVE
POC (MOP) OPIATES: NEGATIVE
POC (MTD) METHADONE: NEGATIVE
POC (OXY) OXYCODONE: NEGATIVE
POC (PCP) PHENCYCLIDINE: NEGATIVE
POC (TCA) TRICYCLIC ANTIDEPRESSANTS: NEGATIVE
POC TEMPERATURE (URINE): 90
POC THC: NEGATIVE
POTASSIUM SERPL-SCNC: 4.1 MMOL/L (ref 3.5–5.1)
PROT SERPL-MCNC: 8.3 G/DL (ref 6.4–8.2)
RBC # BLD AUTO: 3.93 M/UL (ref 4.2–5.4)
SODIUM SERPL-SCNC: 131 MMOL/L (ref 136–145)
TRIGL SERPL-MCNC: 126 MG/DL (ref 35–150)
VLDLC SERPL-MCNC: 25 MG/DL
WBC # BLD AUTO: 6.49 K/UL (ref 4.5–11)

## 2023-10-24 PROCEDURE — 80053 COMPREHEN METABOLIC PANEL: CPT | Mod: ,,, | Performed by: CLINICAL MEDICAL LABORATORY

## 2023-10-24 PROCEDURE — 85025 COMPLETE CBC W/AUTO DIFF WBC: CPT | Mod: ,,, | Performed by: CLINICAL MEDICAL LABORATORY

## 2023-10-24 PROCEDURE — 83036 HEMOGLOBIN GLYCOSYLATED A1C: CPT | Mod: ,,, | Performed by: CLINICAL MEDICAL LABORATORY

## 2023-10-24 PROCEDURE — 80053 COMPREHENSIVE METABOLIC PANEL: ICD-10-PCS | Mod: ,,, | Performed by: CLINICAL MEDICAL LABORATORY

## 2023-10-24 PROCEDURE — 85025 CBC WITH DIFFERENTIAL: ICD-10-PCS | Mod: ,,, | Performed by: CLINICAL MEDICAL LABORATORY

## 2023-10-24 PROCEDURE — 80061 LIPID PANEL: CPT | Mod: ,,, | Performed by: CLINICAL MEDICAL LABORATORY

## 2023-10-24 PROCEDURE — 83036 HEMOGLOBIN A1C: ICD-10-PCS | Mod: ,,, | Performed by: CLINICAL MEDICAL LABORATORY

## 2023-10-24 PROCEDURE — 99214 PR OFFICE/OUTPT VISIT, EST, LEVL IV, 30-39 MIN: ICD-10-PCS | Mod: ,,, | Performed by: NURSE PRACTITIONER

## 2023-10-24 PROCEDURE — 80061 LIPID PANEL: ICD-10-PCS | Mod: ,,, | Performed by: CLINICAL MEDICAL LABORATORY

## 2023-10-24 PROCEDURE — 99214 OFFICE O/P EST MOD 30 MIN: CPT | Mod: ,,, | Performed by: NURSE PRACTITIONER

## 2023-10-24 PROCEDURE — 80305 DRUG TEST PRSMV DIR OPT OBS: CPT | Mod: RHCUB | Performed by: NURSE PRACTITIONER

## 2023-10-24 RX ORDER — CLONIDINE HYDROCHLORIDE 0.2 MG/1
.1-.2 TABLET ORAL 2 TIMES DAILY
Qty: 180 TABLET | Refills: 1 | Status: SHIPPED | OUTPATIENT
Start: 2023-10-24 | End: 2024-01-18 | Stop reason: SDUPTHER

## 2023-10-24 RX ORDER — SODIUM CHLORIDE 1 G/1
1000 TABLET ORAL 3 TIMES DAILY
Qty: 21 TABLET | Refills: 0 | Status: SHIPPED | OUTPATIENT
Start: 2023-10-24 | End: 2023-10-31

## 2023-10-24 RX ORDER — AMLODIPINE BESYLATE 10 MG/1
10 TABLET ORAL DAILY
Qty: 90 TABLET | Refills: 1 | Status: SHIPPED | OUTPATIENT
Start: 2023-10-24 | End: 2024-01-18 | Stop reason: SDUPTHER

## 2023-10-24 RX ORDER — TRIAMCINOLONE ACETONIDE 1 MG/G
CREAM TOPICAL 2 TIMES DAILY
Qty: 15 G | Refills: 0 | Status: SHIPPED | OUTPATIENT
Start: 2023-10-24 | End: 2023-12-07 | Stop reason: SDUPTHER

## 2023-10-24 RX ORDER — ALPRAZOLAM 0.25 MG/1
0.25 TABLET ORAL 2 TIMES DAILY PRN
Qty: 60 TABLET | Refills: 2 | Status: SHIPPED | OUTPATIENT
Start: 2023-10-24 | End: 2024-01-18 | Stop reason: SDUPTHER

## 2023-10-24 RX ORDER — HYDROCHLOROTHIAZIDE 25 MG/1
25 TABLET ORAL DAILY
Qty: 90 TABLET | Refills: 1 | Status: SHIPPED | OUTPATIENT
Start: 2023-10-24 | End: 2024-01-18 | Stop reason: SDUPTHER

## 2023-10-24 RX ORDER — GABAPENTIN 100 MG/1
100 CAPSULE ORAL 3 TIMES DAILY
Qty: 270 CAPSULE | Refills: 1 | Status: SHIPPED | OUTPATIENT
Start: 2023-10-24 | End: 2024-01-18 | Stop reason: SDUPTHER

## 2023-10-24 RX ORDER — CARVEDILOL 6.25 MG/1
6.25 TABLET ORAL 2 TIMES DAILY WITH MEALS
Qty: 180 TABLET | Refills: 1 | Status: SHIPPED | OUTPATIENT
Start: 2023-10-24 | End: 2024-01-18 | Stop reason: SDUPTHER

## 2023-10-24 NOTE — ASSESSMENT & PLAN NOTE
Well controlled on Xanax BID PRN. Continue on as needed basis. Follow up in 3 months or as needed.

## 2023-10-24 NOTE — PROGRESS NOTES
Myranda Velásquez NP   [facility]  [address]  [place]  [address]      PATIENT NAME: Yana Oates  : 1945  DATE: 10/24/23  MRN: [id number]      Billing Provider: Myranda Velásquez NP  Level of Service: GA OFFICE/OUTPT VISIT, EST, LEVL IV, 30-39 MIN  Patient PCP Information       Provider PCP Type    Myranda Velásquez NP General            Reason for Visit / Chief Complaint: Diabetes (Here for check up and labs.) and Hypertension (Here for check up and labs.)         History of Present Illness / Problem Focused Workflow     Yana Oates presents to the clinic with Diabetes (Here for check up and labs.) and Hypertension (Here for check up and labs.)     78 year old female presents to clinic for check up and medication refill. States she has been doing well and denies problems.           Review of Systems     @Review of Systems   Constitutional:  Negative for activity change, appetite change, fatigue and fever.   HENT:  Negative for nasal congestion, ear pain, rhinorrhea, sinus pressure/congestion and sore throat.    Eyes:  Negative for pain, redness, visual disturbance and eye dryness.   Respiratory:  Negative for cough and shortness of breath.    Cardiovascular:  Negative for chest pain and leg swelling.   Gastrointestinal:  Negative for abdominal distention, abdominal pain, constipation and diarrhea.   Endocrine: Negative for cold intolerance, heat intolerance and polyuria.   Genitourinary:  Negative for bladder incontinence, dysuria, frequency and urgency.   Musculoskeletal:  Negative for arthralgias, gait problem and myalgias.   Integumentary:  Negative for color change, rash and wound.   Allergic/Immunologic: Negative for environmental allergies and food allergies.   Neurological:  Negative for dizziness, weakness, light-headedness and headaches.   Psychiatric/Behavioral:  Negative for behavioral problems and sleep disturbance.        Medical / Social / Family History     Past  Medical History:   Diagnosis Date    Anemia     Anxiety     Diabetes mellitus, type 2     Essential hypertension, benign     Fatigue     GERD (gastroesophageal reflux disease)     Low back pain     Osteoarthritis     Other long term (current) drug therapy     Strain of muscle and tendon of back wall of thorax, initial encounter        Past Surgical History:   Procedure Laterality Date    APPENDECTOMY      CHOLECYSTECTOMY      HYSTERECTOMY      partial    LIPOMA RESECTION Left     LUNG LOBECTOMY Right     due to trauma       Social History  Ms.  reports that she has never smoked. She has never been exposed to tobacco smoke. She has never used smokeless tobacco. She reports that she does not drink alcohol and does not use drugs.    Family History  Ms.'s family history includes Arthritis in her sister; Asthma in her father; Atrial fibrillation in her sister; Brain cancer in her brother; COPD in her brother, brother, and sister; Cancer in her brother and brother; Cervical cancer in her daughter; Heart disease in her brother and mother; Hypertension in her mother; Lung cancer in her brother; No Known Problems in her brother, brother, daughter, daughter, daughter, daughter, daughter, daughter, sister, sister, and sister; Osteoarthritis in her mother; Thyroid disease in her daughter.    Medications and Allergies     Medications  Outpatient Medications Marked as Taking for the 10/24/23 encounter (Office Visit) with Myranda Velásquez NP   Medication Sig Dispense Refill    blood sugar diagnostic Strp by Misc.(Non-Drug; Combo Route) route. CHECK BLOOD SUGAR ONE TIME DAILY AND AS NEEDED      ferrous sulfate (IRON, FERROUS SULFATE,) 325 mg (65 mg iron) Tab tablet Take 1 tablet (325 mg total) by mouth 2 (two) times daily. 180 tablet 3    fluticasone propionate (FLONASE) 50 mcg/actuation nasal spray USE 1 SPRAY IN EACH NOSTRIL 2 TIMES A DAY ((SHAKE WELL)) 48 g 3    meclizine (ANTIVERT) 12.5 mg tablet TAKE 1 TABLET BY MOUTH 3  TIMES A DAY AS NEEDED FOR DIZZINESS (MAY CAUSE DROWSINESS) 90 tablet 2    metFORMIN (GLUCOPHAGE) 500 MG tablet Take 1 tablet (500 mg total) by mouth 2 (two) times a day. 180 tablet 1    multivitamin-iron-folic acid Tab Take 1 tablet by mouth once daily.      omeprazole (PRILOSEC) 20 MG capsule Take 1 capsule (20 mg total) by mouth 2 (two) times a day. 180 capsule 1    [DISCONTINUED] ALPRAZolam (XANAX) 0.25 MG tablet Take 1 tablet (0.25 mg total) by mouth 2 (two) times daily as needed for Anxiety. 60 tablet 2    [DISCONTINUED] amLODIPine (NORVASC) 10 MG tablet Take 1 tablet (10 mg total) by mouth once daily. 90 tablet 1    [DISCONTINUED] carvediloL (COREG) 6.25 MG tablet Take 1 tablet (6.25 mg total) by mouth 2 (two) times daily with meals. 180 tablet 1    [DISCONTINUED] cloNIDine (CATAPRES) 0.2 MG tablet Take 0.5-1 tablets (0.1-0.2 mg total) by mouth 2 (two) times daily. 180 tablet 1    [DISCONTINUED] gabapentin (NEURONTIN) 100 MG capsule Take 1 capsule (100 mg total) by mouth 3 (three) times daily. 270 capsule 1    [DISCONTINUED] hydroCHLOROthiazide (HYDRODIURIL) 25 MG tablet Take 1 tablet (25 mg total) by mouth once daily. 90 tablet 1    [DISCONTINUED] magnesium chloride (MAG 64) 64 mg TbEC Take 1 tablet (64 mg total) by mouth 2 (two) times daily. 180 tablet 1    [DISCONTINUED] triamcinolone acetonide 0.1% (KENALOG) 0.1 % cream Apply topically 2 (two) times daily. 15 g 0       Allergies  Review of patient's allergies indicates:   Allergen Reactions    Ace inhibitors Anaphylaxis    Penicillins Anaphylaxis    Bactrim [sulfamethoxazole-trimethoprim]     Biaxin [clarithromycin]     Ciprofloxacin     Iodine and iodide containing products Hives    Latex     Mobic [meloxicam]     Sulfa (sulfonamide antibiotics) Hives and Rash       Physical Examination     Vitals:    10/24/23 0954   BP: 136/86   Pulse: 94   Resp: 18   Temp: 97.9 °F (36.6 °C)     Physical Exam  Vitals and nursing note reviewed.   HENT:      Head:  Normocephalic.      Right Ear: Tympanic membrane normal.      Left Ear: Tympanic membrane normal.      Nose: Nose normal.      Mouth/Throat:      Mouth: Mucous membranes are moist.      Pharynx: Oropharynx is clear. No posterior oropharyngeal erythema.   Eyes:      Conjunctiva/sclera: Conjunctivae normal.   Cardiovascular:      Rate and Rhythm: Normal rate and regular rhythm.      Pulses: Normal pulses.           Dorsalis pedis pulses are 2+ on the right side and 2+ on the left side.        Posterior tibial pulses are 2+ on the right side and 2+ on the left side.      Heart sounds: Normal heart sounds.   Pulmonary:      Effort: Pulmonary effort is normal.      Breath sounds: Normal breath sounds.   Abdominal:      General: Abdomen is flat. Bowel sounds are normal. There is no distension.      Palpations: Abdomen is soft.   Musculoskeletal:         General: No swelling or tenderness. Normal range of motion.      Cervical back: Normal range of motion.      Right lower leg: No edema.      Left lower leg: No edema.   Feet:      Right foot:      Protective Sensation: 9 sites tested.  9 sites sensed.      Skin integrity: Skin integrity normal.      Toenail Condition: Right toenails are normal.      Left foot:      Protective Sensation: 9 sites tested.  9 sites sensed.      Skin integrity: Skin integrity normal.      Toenail Condition: Left toenails are normal.   Skin:     General: Skin is warm and dry.      Capillary Refill: Capillary refill takes less than 2 seconds.   Neurological:      Mental Status: She is alert. Mental status is at baseline.   Psychiatric:         Mood and Affect: Mood normal.         Behavior: Behavior normal.               Lab Results   Component Value Date    WBC 5.59 03/08/2023    HGB 11.9 (L) 03/08/2023    HCT 34.9 (L) 03/08/2023    MCV 94.6 03/08/2023     03/08/2023        CMP  Sodium   Date Value Ref Range Status   03/08/2023 133 (L) 136 - 145 mmol/L Final     Potassium   Date Value Ref  Range Status   03/08/2023 4.1 3.5 - 5.1 mmol/L Final     Chloride   Date Value Ref Range Status   03/08/2023 96 (L) 98 - 107 mmol/L Final     CO2   Date Value Ref Range Status   03/08/2023 29 21 - 32 mmol/L Final     Glucose   Date Value Ref Range Status   03/08/2023 118 (H) 74 - 106 mg/dL Final     BUN   Date Value Ref Range Status   03/08/2023 9 7 - 18 mg/dL Final     Creatinine   Date Value Ref Range Status   03/08/2023 0.74 0.55 - 1.02 mg/dL Final     Calcium   Date Value Ref Range Status   03/08/2023 9.8 8.5 - 10.1 mg/dL Final     Total Protein   Date Value Ref Range Status   03/08/2023 8.0 6.4 - 8.2 g/dL Final     Albumin   Date Value Ref Range Status   03/08/2023 4.1 3.5 - 5.0 g/dL Final     Bilirubin, Total   Date Value Ref Range Status   03/08/2023 0.4 >0.0 - 1.2 mg/dL Final     Alk Phos   Date Value Ref Range Status   03/08/2023 114 55 - 142 U/L Final     AST   Date Value Ref Range Status   03/08/2023 10 (L) 15 - 37 U/L Final     ALT   Date Value Ref Range Status   03/08/2023 16 13 - 56 U/L Final     Anion Gap   Date Value Ref Range Status   03/08/2023 12 7 - 16 mmol/L Final     eGFR   Date Value Ref Range Status   03/08/2023 82 >=60 mL/min/1.73m² Final     Procedures   Assessment and Plan (including Health Maintenance)   :    Plan:           Problem List Items Addressed This Visit          Neuro    Diabetic mononeuropathy associated with type 2 diabetes mellitus    Current Assessment & Plan     Well controlled on Neurontin. Continue at current dosage. Follow up in 3 months or as needed.          Relevant Medications    gabapentin (NEURONTIN) 100 MG capsule    Other Relevant Orders    Comprehensive Metabolic Panel    Hemoglobin A1C       Psychiatric    Anxiety    Current Assessment & Plan     Well controlled on Xanax BID PRN. Continue on as needed basis. Follow up in 3 months or as needed.          Relevant Medications    ALPRAZolam (XANAX) 0.25 MG tablet       Cardiac/Vascular    Essential hypertension,  benign    Current Assessment & Plan     Blood pressure well controlled. Continue current medications. Follow up in 3 months or as needed.            Relevant Medications    amLODIPine (NORVASC) 10 MG tablet    carvediloL (COREG) 6.25 MG tablet    cloNIDine (CATAPRES) 0.2 MG tablet    hydroCHLOROthiazide (HYDRODIURIL) 25 MG tablet    magnesium chloride (MAG 64) 64 mg TbEC    Other Relevant Orders    CBC Auto Differential    Comprehensive Metabolic Panel    Lipid Panel    Hyperlipidemia    Current Assessment & Plan     Lipid panel obtained at today's visit. Goal LDL is less than 70. Continue current meds and low fat/low cholesterol diet with increased exercise as tolerated. Will follow up with labs. Patient to follow up in 3 months or as needed.               Endocrine    Diabetes mellitus, type 2    Current Assessment & Plan     HgbA1C obtained at today's visit. Goal is less than 7. Continue current meds and low carb/no concentrated sweets diet with increased exercise as tolerated. Will follow up with lab results. Patient to follow up in 3 months or as needed.               GI    GERD (gastroesophageal reflux disease)    Current Assessment & Plan     Well controlled on Omeprazole. Continue at current dosage. Follow up in 3 months or as needed.            Palliative Care    Other long term (current) drug therapy - Primary    Relevant Orders    POCT Urine Drug Screen Presump (Completed)       Health Maintenance Topics with due status: Not Due       Topic Last Completion Date    Lipid Panel 03/08/2023    Eye Exam 07/11/2023    Diabetes Urine Screening 07/24/2023       Future Appointments   Date Time Provider Department Center   12/7/2023  1:00 PM [facility] NURSE [facility]        Health Maintenance Due   Topic Date Due    Pneumococcal Vaccines (Age 65+) (1 - PCV) Never done    DEXA Scan  Never done    Shingles Vaccine (1 of 2) Never done    COVID-19 Vaccine (4 - 2023-24 season) 09/01/2023    Hemoglobin  A1c  09/08/2023    Foot Exam  09/22/2023        No follow-ups on file.     Signature:  Myranda Velásquez NP  [address]  [address]    Date of encounter: 10/24/23

## 2023-10-25 NOTE — PROGRESS NOTES
Labs reviewed: A1C was 6.7 which remains under goal of 7. Continue current meds and diabetic diet. Sodium was low, it has been in the past but not this low. I am gong to send her in some sodium tablets and want her to take one tab 3 times daily for one week. We will recheck her sodium in one month. Cholesterol level was high. Follow low fat/low cholesterol diet.

## 2023-11-29 RX ORDER — SODIUM BICARBONATE 650 MG/1
650 TABLET ORAL 3 TIMES DAILY
Qty: 21 TABLET | Refills: 0 | Status: SHIPPED | OUTPATIENT
Start: 2023-11-29 | End: 2023-12-07

## 2023-12-04 NOTE — PROGRESS NOTES
Yana Oates presented for a  Medicare AWV and comprehensive Health Risk Assessment today. The following components were reviewed and updated:    Medical history  Family History  Social history  Allergies and Current Medications  Health Risk Assessment  Health Maintenance  Care Team         ** See Completed Assessments for Annual Wellness Visit within the encounter summary.**         The following assessments were completed:  Living Situation  CAGE  Depression Screening  Timed Get Up and Go  Whisper Test  Cognitive Function Screening  Nutrition Screening  ADL Screening  PAQ Screening        There were no vitals filed for this visit.  There is no height or weight on file to calculate BMI.  Physical Exam  Vitals and nursing note reviewed.   Constitutional:       General: She is awake.      Appearance: Normal appearance.   HENT:      Head: Normocephalic.      Right Ear: Tympanic membrane, ear canal and external ear normal.      Left Ear: Tympanic membrane, ear canal and external ear normal.      Nose: Nose normal.      Mouth/Throat:      Lips: Pink.      Mouth: Mucous membranes are moist.      Pharynx: Oropharynx is clear. Uvula midline.   Cardiovascular:      Rate and Rhythm: Normal rate and regular rhythm.      Heart sounds: Normal heart sounds, S1 normal and S2 normal.   Pulmonary:      Effort: Pulmonary effort is normal. No respiratory distress.      Breath sounds: Normal breath sounds. No decreased breath sounds, wheezing, rhonchi or rales.   Abdominal:      General: Bowel sounds are normal.      Palpations: Abdomen is soft.      Tenderness: There is no abdominal tenderness.   Musculoskeletal:      Cervical back: Normal range of motion.   Skin:     General: Skin is warm.      Capillary Refill: Capillary refill takes less than 2 seconds.   Neurological:      Mental Status: She is alert and oriented to person, place, and time.   Psychiatric:         Thought Content: Thought content does not include homicidal or  suicidal ideation. Thought content does not include homicidal or suicidal plan.           Diagnoses and health risks identified today and associated recommendations/orders:    Problem List Items Addressed This Visit    None  Visit Diagnoses       Encounter for subsequent annual wellness visit (AWV) in Medicare patient    -  Primary            Provided Yana with a 5-10 year written screening schedule and personal prevention plan. Recommendations were developed using the USPSTF age appropriate recommendations. Education, counseling, and referrals were provided as needed. After Visit Summary printed and given to patient which includes a list of additional screenings\tests needed.    Follow up for yearly annual wellness visit    Declined Dexa and all vaccines today

## 2023-12-07 ENCOUNTER — OFFICE VISIT (OUTPATIENT)
Dept: FAMILY MEDICINE | Facility: CLINIC | Age: 78
End: 2023-12-07
Payer: MEDICARE

## 2023-12-07 VITALS
WEIGHT: 144 LBS | HEIGHT: 64 IN | HEART RATE: 84 BPM | TEMPERATURE: 98 F | DIASTOLIC BLOOD PRESSURE: 72 MMHG | SYSTOLIC BLOOD PRESSURE: 118 MMHG | RESPIRATION RATE: 20 BRPM | BODY MASS INDEX: 24.59 KG/M2 | OXYGEN SATURATION: 98 %

## 2023-12-07 DIAGNOSIS — R21 RASH: ICD-10-CM

## 2023-12-07 DIAGNOSIS — E11.9 TYPE 2 DIABETES MELLITUS WITHOUT COMPLICATION, WITHOUT LONG-TERM CURRENT USE OF INSULIN: ICD-10-CM

## 2023-12-07 DIAGNOSIS — Z00.00 ENCOUNTER FOR PREVENTIVE HEALTH EXAMINATION: ICD-10-CM

## 2023-12-07 DIAGNOSIS — E11.41 DIABETIC MONONEUROPATHY ASSOCIATED WITH TYPE 2 DIABETES MELLITUS: ICD-10-CM

## 2023-12-07 DIAGNOSIS — Z00.00 ENCOUNTER FOR SUBSEQUENT ANNUAL WELLNESS VISIT (AWV) IN MEDICARE PATIENT: Primary | ICD-10-CM

## 2023-12-07 DIAGNOSIS — I10 ESSENTIAL HYPERTENSION, BENIGN: ICD-10-CM

## 2023-12-07 DIAGNOSIS — F41.9 ANXIETY: ICD-10-CM

## 2023-12-07 PROCEDURE — G0439 PPPS, SUBSEQ VISIT: HCPCS | Mod: ,,,

## 2023-12-07 RX ORDER — TRIAMCINOLONE ACETONIDE 1 MG/G
CREAM TOPICAL 2 TIMES DAILY
Qty: 15 G | Refills: 1 | Status: SHIPPED | OUTPATIENT
Start: 2023-12-07 | End: 2024-04-18 | Stop reason: SDUPTHER

## 2023-12-21 ENCOUNTER — OFFICE VISIT (OUTPATIENT)
Dept: FAMILY MEDICINE | Facility: CLINIC | Age: 78
End: 2023-12-21
Payer: MEDICARE

## 2023-12-21 VITALS
DIASTOLIC BLOOD PRESSURE: 72 MMHG | HEIGHT: 64 IN | TEMPERATURE: 98 F | BODY MASS INDEX: 24.96 KG/M2 | HEART RATE: 80 BPM | WEIGHT: 146.19 LBS | SYSTOLIC BLOOD PRESSURE: 110 MMHG | RESPIRATION RATE: 18 BRPM | OXYGEN SATURATION: 97 %

## 2023-12-21 DIAGNOSIS — M54.31 SCIATICA OF RIGHT SIDE: Primary | ICD-10-CM

## 2023-12-21 PROCEDURE — 96372 PR INJECTION,THERAP/PROPH/DIAG2ST, IM OR SUBCUT: ICD-10-PCS | Mod: ,,,

## 2023-12-21 PROCEDURE — 99213 PR OFFICE/OUTPT VISIT, EST, LEVL III, 20-29 MIN: ICD-10-PCS | Mod: ,,,

## 2023-12-21 PROCEDURE — 99213 OFFICE O/P EST LOW 20 MIN: CPT | Mod: ,,,

## 2023-12-21 PROCEDURE — 96372 THER/PROPH/DIAG INJ SC/IM: CPT | Mod: ,,,

## 2023-12-21 RX ORDER — ALPRAZOLAM 0.25 MG/1
TABLET ORAL
COMMUNITY

## 2023-12-21 RX ORDER — METHYLPREDNISOLONE ACETATE 40 MG/ML
40 INJECTION, SUSPENSION INTRA-ARTICULAR; INTRALESIONAL; INTRAMUSCULAR; SOFT TISSUE
Status: COMPLETED | OUTPATIENT
Start: 2023-12-21 | End: 2023-12-21

## 2023-12-21 RX ORDER — DEXAMETHASONE SODIUM PHOSPHATE 4 MG/ML
4 INJECTION, SOLUTION INTRA-ARTICULAR; INTRALESIONAL; INTRAMUSCULAR; INTRAVENOUS; SOFT TISSUE
Status: COMPLETED | OUTPATIENT
Start: 2023-12-21 | End: 2023-12-21

## 2023-12-21 RX ORDER — CLINDAMYCIN HYDROCHLORIDE 300 MG/1
300 CAPSULE ORAL EVERY 8 HOURS
COMMUNITY
Start: 2023-12-08

## 2023-12-21 RX ORDER — HYDROCODONE BITARTRATE AND ACETAMINOPHEN 5; 325 MG/1; MG/1
1 TABLET ORAL EVERY 6 HOURS PRN
COMMUNITY
Start: 2023-12-08

## 2023-12-21 RX ORDER — CLONIDINE 0.2 MG/24H
1 PATCH, EXTENDED RELEASE TRANSDERMAL
COMMUNITY
End: 2024-01-18

## 2023-12-21 RX ORDER — MULTIVIT-MINERALS/FOLIC ACID 80 MCG
TABLET,CHEWABLE ORAL
COMMUNITY

## 2023-12-21 RX ADMIN — METHYLPREDNISOLONE ACETATE 40 MG: 40 INJECTION, SUSPENSION INTRA-ARTICULAR; INTRALESIONAL; INTRAMUSCULAR; SOFT TISSUE at 03:12

## 2023-12-21 RX ADMIN — DEXAMETHASONE SODIUM PHOSPHATE 4 MG: 4 INJECTION, SOLUTION INTRA-ARTICULAR; INTRALESIONAL; INTRAMUSCULAR; INTRAVENOUS; SOFT TISSUE at 03:12

## 2023-12-21 NOTE — PATIENT INSTRUCTIONS
Use a heating pad (put barrier between skin and heating pad)  Take tylenol for pain  Steroid shot today  Come back to the clinic if needed and we will send in a pain referral.   Increase fluid intake

## 2023-12-21 NOTE — ASSESSMENT & PLAN NOTE
Use a heating pad (put barrier between skin and heating pad)  Take tylenol for pain  Steroid shot today  Come back to the clinic if needed and we will send in a pain referral.   Increase fluid intake.  She can not take anything with meloxicam in it so no toradol shot. She has had 12 Norcos filled on 12/ from Dr. Rene. She has completed that.  She will need a pain management referral if pain continues.

## 2023-12-21 NOTE — PROGRESS NOTES
HPI:   Yana Oates is a pleasant 78 y.o. patient who reports to clinic with complaints of pain in her left hip and it shoots down her left thigh. She states that the pain started about 3 days ago. She fell about 7 years ago and she has these flare ups with her hip and thigh from time to time after that. She has tried a heating pad and some cream at home and it is not helping her. She states she has had this pain in the past. She denies any recent injury.     Back Pain  This is a chronic problem. The current episode started yesterday. The problem occurs intermittently. The problem is unchanged. The quality of the pain is described as aching, shooting and stabbing. The pain radiates to the left thigh. The pain is at a severity of 10/10. The pain is moderate. The pain is The same all the time. The symptoms are aggravated by bending, position, standing, sitting, lying down and twisting. Stiffness is present All day. Pertinent negatives include no chest pain or fever. The treatment provided mild relief.                Past Medical History:   Diagnosis Date    Anemia     Anxiety     Diabetes mellitus, type 2     Essential hypertension, benign     Fatigue     GERD (gastroesophageal reflux disease)     Low back pain     Osteoarthritis     Other long term (current) drug therapy     Strain of muscle and tendon of back wall of thorax, initial encounter        PAST SURGICAL HISTORY:   Past Surgical History:   Procedure Laterality Date    APPENDECTOMY      CHOLECYSTECTOMY      HYSTERECTOMY      partial    LIPOMA RESECTION Left     LUNG LOBECTOMY Right     due to trauma       MEDICATIONS:    Current Outpatient Medications:     ALPRAZolam (XANAX) 0.25 MG tablet, Take 1 tablet (0.25 mg total) by mouth 2 (two) times daily as needed for Anxiety., Disp: 60 tablet, Rfl: 2    amLODIPine (NORVASC) 10 MG tablet, Take 1 tablet (10 mg total) by mouth once daily., Disp: 90 tablet, Rfl: 1    blood sugar diagnostic Strp, by Misc.(Non-Drug;  Combo Route) route. CHECK BLOOD SUGAR ONE TIME DAILY AND AS NEEDED, Disp: , Rfl:     carvediloL (COREG) 6.25 MG tablet, Take 1 tablet (6.25 mg total) by mouth 2 (two) times daily with meals., Disp: 180 tablet, Rfl: 1    clindamycin (CLEOCIN) 300 MG capsule, Take 300 mg by mouth every 8 (eight) hours., Disp: , Rfl:     cloNIDine (CATAPRES) 0.2 MG tablet, Take 0.5-1 tablets (0.1-0.2 mg total) by mouth 2 (two) times daily., Disp: 180 tablet, Rfl: 1    ferrous sulfate (IRON, FERROUS SULFATE,) 325 mg (65 mg iron) Tab tablet, Take 1 tablet (325 mg total) by mouth 2 (two) times daily., Disp: 180 tablet, Rfl: 3    fluticasone propionate (FLONASE) 50 mcg/actuation nasal spray, USE 1 SPRAY IN EACH NOSTRIL 2 TIMES A DAY ((SHAKE WELL)), Disp: 48 g, Rfl: 3    gabapentin (NEURONTIN) 100 MG capsule, Take 1 capsule (100 mg total) by mouth 3 (three) times daily., Disp: 270 capsule, Rfl: 1    hydroCHLOROthiazide (HYDRODIURIL) 25 MG tablet, Take 1 tablet (25 mg total) by mouth once daily., Disp: 90 tablet, Rfl: 1    HYDROcodone-acetaminophen (NORCO) 5-325 mg per tablet, Take 1 tablet by mouth every 6 (six) hours as needed., Disp: , Rfl:     magnesium chloride (MAG 64) 64 mg TbEC, Take 1 tablet (64 mg total) by mouth 2 (two) times daily., Disp: 180 tablet, Rfl: 1    meclizine (ANTIVERT) 12.5 mg tablet, TAKE 1 TABLET BY MOUTH 3 TIMES A DAY AS NEEDED FOR DIZZINESS (MAY CAUSE DROWSINESS), Disp: 90 tablet, Rfl: 2    metFORMIN (GLUCOPHAGE) 500 MG tablet, Take 1 tablet (500 mg total) by mouth 2 (two) times a day., Disp: 180 tablet, Rfl: 1    multivit with min-folic acid (CENTRUM ADULT 50 PLUS) 80 mcg Chew, , Disp: , Rfl:     multivitamin-iron-folic acid Tab, Take 1 tablet by mouth once daily., Disp: , Rfl:     omeprazole (PRILOSEC) 20 MG capsule, Take 1 capsule (20 mg total) by mouth 2 (two) times a day., Disp: 180 capsule, Rfl: 1    triamcinolone acetonide 0.1% (KENALOG) 0.1 % cream, Apply topically 2 (two) times daily., Disp: 15 g, Rfl:  1    ALPRAZolam (XANAX) 0.25 MG tablet, Take by mouth., Disp: , Rfl:     cloNIDine 0.2 mg/24 hr td ptwk (CATAPRES) 0.2 mg/24 hr, Place 1 patch onto the skin., Disp: , Rfl:     sodium bicarbonate 650 MG tablet, Take 1 tablet (650 mg total) by mouth 3 (three) times daily. for 7 days, Disp: 21 tablet, Rfl: 0  No current facility-administered medications for this visit.    ALLERGIES:   Review of patient's allergies indicates:   Allergen Reactions    Ace inhibitors Anaphylaxis    Penicillins Anaphylaxis and Swelling    Sulfa (sulfonamide antibiotics) Hives, Rash and Swelling    Bactrim [sulfamethoxazole-trimethoprim]     Biaxin [clarithromycin]     Ciprofloxacin     Iodine and iodide containing products Hives    Mobic [meloxicam]     Trimethoprim     Iodine Rash    Latex Rash         Review of Systems   Constitutional: Negative.  Negative for activity change, chills and fever.   HENT: Negative.  Negative for drooling and nosebleeds.    Eyes: Negative.    Respiratory: Negative.  Negative for chest tightness.    Cardiovascular: Negative.  Negative for chest pain and palpitations.   Gastrointestinal: Negative.    Endocrine: Negative.    Genitourinary: Negative.  Negative for difficulty urinating.   Musculoskeletal:  Positive for back pain.        Radiates to her left thigh when standing up.    Integumentary:  Negative.   Allergic/Immunologic: Negative.    Neurological: Negative.  Negative for dizziness.   Hematological: Negative.    Psychiatric/Behavioral: Negative.     All other systems reviewed and are negative.         Physical Exam  Constitutional:       General: She is not in acute distress.     Appearance: Normal appearance. She is well-developed and normal weight. She is not ill-appearing.   HENT:      Head: Normocephalic and atraumatic.      Right Ear: Tympanic membrane normal.      Left Ear: Tympanic membrane normal.      Nose: Nose normal.      Mouth/Throat:      Mouth: Mucous membranes are moist.      Pharynx:  "Oropharynx is clear. No posterior oropharyngeal erythema.   Cardiovascular:      Rate and Rhythm: Normal rate and regular rhythm.      Pulses: Normal pulses.      Heart sounds: Normal heart sounds.   Pulmonary:      Effort: Pulmonary effort is normal. No accessory muscle usage or respiratory distress.      Breath sounds: Normal breath sounds.   Abdominal:      General: Abdomen is flat. Bowel sounds are normal. There is no distension.      Palpations: Abdomen is soft.      Tenderness: There is no abdominal tenderness.   Musculoskeletal:         General: Normal range of motion.      Cervical back: Normal range of motion.   Skin:     General: Skin is warm and dry.      Capillary Refill: Capillary refill takes less than 2 seconds.   Neurological:      Mental Status: She is alert and oriented to person, place, and time. Mental status is at baseline.   Psychiatric:         Mood and Affect: Mood normal.         Speech: Speech normal.         Behavior: Behavior normal. Behavior is cooperative.         Thought Content: Thought content normal.          VITAL SIGNS:   /72 (BP Location: Right arm, Patient Position: Sitting, BP Method: Medium (Automatic))   Pulse 80   Temp 97.7 °F (36.5 °C) (Oral)   Resp 18   Ht 5' 4" (1.626 m)   Wt 66.3 kg (146 lb 3.2 oz)   LMP  (LMP Unknown)   SpO2 97%   BMI 25.10 kg/m²       ASSESSMENT/PLAN  1. Sciatica of right side  Assessment & Plan:  Use a heating pad (put barrier between skin and heating pad)  Take tylenol for pain  Steroid shot today  Come back to the clinic if needed and we will send in a pain referral.   Increase fluid intake.  She can not take anything with meloxicam in it so no toradol shot. She has had 12 Norcos filled on 12/ from Dr. Rene. She has completed that.  She will need a pain management referral if pain continues.     Orders:  -     dexAMETHasone injection 4 mg  -     methylPREDNISolone acetate injection 40 mg             Patient Instructions   Use a " heating pad (put barrier between skin and heating pad)  Take tylenol for pain  Steroid shot today  Come back to the clinic if needed and we will send in a pain referral.   Increase fluid intake    No orders of the defined types were placed in this encounter.

## 2024-01-18 ENCOUNTER — OFFICE VISIT (OUTPATIENT)
Dept: FAMILY MEDICINE | Facility: CLINIC | Age: 79
End: 2024-01-18
Payer: MEDICARE

## 2024-01-18 VITALS
WEIGHT: 144.63 LBS | HEIGHT: 64 IN | TEMPERATURE: 98 F | DIASTOLIC BLOOD PRESSURE: 90 MMHG | HEART RATE: 93 BPM | BODY MASS INDEX: 24.69 KG/M2 | SYSTOLIC BLOOD PRESSURE: 128 MMHG | OXYGEN SATURATION: 97 % | RESPIRATION RATE: 19 BRPM

## 2024-01-18 DIAGNOSIS — E11.41 DIABETIC MONONEUROPATHY ASSOCIATED WITH TYPE 2 DIABETES MELLITUS: ICD-10-CM

## 2024-01-18 DIAGNOSIS — E11.9 TYPE 2 DIABETES MELLITUS WITHOUT COMPLICATION, WITHOUT LONG-TERM CURRENT USE OF INSULIN: ICD-10-CM

## 2024-01-18 DIAGNOSIS — K21.9 GASTROESOPHAGEAL REFLUX DISEASE, UNSPECIFIED WHETHER ESOPHAGITIS PRESENT: ICD-10-CM

## 2024-01-18 DIAGNOSIS — Z79.899 OTHER LONG TERM (CURRENT) DRUG THERAPY: Primary | ICD-10-CM

## 2024-01-18 DIAGNOSIS — R42 DIZZINESS: ICD-10-CM

## 2024-01-18 DIAGNOSIS — I10 ESSENTIAL HYPERTENSION, BENIGN: ICD-10-CM

## 2024-01-18 DIAGNOSIS — F41.9 ANXIETY: ICD-10-CM

## 2024-01-18 LAB
CTP QC/QA: YES
POC (AMP) AMPHETAMINE: NEGATIVE
POC (BAR) BARBITURATES: NEGATIVE
POC (BUP) BUPRENORPHINE: NEGATIVE
POC (BZO) BENZODIAZEPINES: ABNORMAL
POC (COC) COCAINE: NEGATIVE
POC (MDMA) METHYLENEDIOXYMETHAMPHETAMINE 3,4: NEGATIVE
POC (MET) METHAMPHETAMINE: NEGATIVE
POC (MOP) OPIATES: NEGATIVE
POC (MTD) METHADONE: NEGATIVE
POC (OXY) OXYCODONE: NEGATIVE
POC (PCP) PHENCYCLIDINE: NEGATIVE
POC (TCA) TRICYCLIC ANTIDEPRESSANTS: NEGATIVE
POC TEMPERATURE (URINE): 90
POC THC: NEGATIVE

## 2024-01-18 PROCEDURE — 99213 OFFICE O/P EST LOW 20 MIN: CPT | Mod: ,,,

## 2024-01-18 PROCEDURE — 80305 DRUG TEST PRSMV DIR OPT OBS: CPT | Mod: RHCUB

## 2024-01-18 RX ORDER — MECLIZINE HCL 12.5 MG 12.5 MG/1
TABLET ORAL
Qty: 90 TABLET | Refills: 2 | Status: SHIPPED | OUTPATIENT
Start: 2024-01-18 | End: 2024-04-18 | Stop reason: SDUPTHER

## 2024-01-18 RX ORDER — HYDROCHLOROTHIAZIDE 25 MG/1
25 TABLET ORAL DAILY
Qty: 90 TABLET | Refills: 1 | Status: SHIPPED | OUTPATIENT
Start: 2024-01-18 | End: 2024-04-18 | Stop reason: SDUPTHER

## 2024-01-18 RX ORDER — METFORMIN HYDROCHLORIDE 500 MG/1
500 TABLET ORAL 2 TIMES DAILY
Qty: 180 TABLET | Refills: 1 | Status: SHIPPED | OUTPATIENT
Start: 2024-01-18 | End: 2024-04-18 | Stop reason: SDUPTHER

## 2024-01-18 RX ORDER — AMLODIPINE BESYLATE 10 MG/1
10 TABLET ORAL DAILY
Qty: 90 TABLET | Refills: 1 | Status: SHIPPED | OUTPATIENT
Start: 2024-01-18 | End: 2024-04-18 | Stop reason: SDUPTHER

## 2024-01-18 RX ORDER — MAGNESIUM CHLORIDE 64 MG
64 TABLET, DELAYED RELEASE (ENTERIC COATED) ORAL 2 TIMES DAILY
Qty: 180 TABLET | Refills: 1 | Status: SHIPPED | OUTPATIENT
Start: 2024-01-18

## 2024-01-18 RX ORDER — GABAPENTIN 100 MG/1
100 CAPSULE ORAL 3 TIMES DAILY
Qty: 270 CAPSULE | Refills: 1 | Status: SHIPPED | OUTPATIENT
Start: 2024-01-18 | End: 2024-04-18

## 2024-01-18 RX ORDER — OMEPRAZOLE 20 MG/1
20 CAPSULE, DELAYED RELEASE ORAL 2 TIMES DAILY
Qty: 180 CAPSULE | Refills: 1 | Status: SHIPPED | OUTPATIENT
Start: 2024-01-18

## 2024-01-18 RX ORDER — CLONIDINE HYDROCHLORIDE 0.2 MG/1
.1-.2 TABLET ORAL 2 TIMES DAILY
Qty: 180 TABLET | Refills: 1 | Status: SHIPPED | OUTPATIENT
Start: 2024-01-18

## 2024-01-18 RX ORDER — ALPRAZOLAM 0.25 MG/1
0.25 TABLET ORAL 2 TIMES DAILY PRN
Qty: 60 TABLET | Refills: 2 | Status: SHIPPED | OUTPATIENT
Start: 2024-01-18 | End: 2024-04-18 | Stop reason: SDUPTHER

## 2024-01-18 RX ORDER — CARVEDILOL 6.25 MG/1
6.25 TABLET ORAL 2 TIMES DAILY WITH MEALS
Qty: 180 TABLET | Refills: 1 | Status: SHIPPED | OUTPATIENT
Start: 2024-01-18 | End: 2024-04-18 | Stop reason: SDUPTHER

## 2024-01-18 NOTE — PROGRESS NOTES
WHIT DOUGLAS   [facility]  [address]  [place]  [address]      PATIENT NAME: Yana Oates  : 1945  DATE: 24  MRN: [id number]        Reason for Visit / Chief Complaint: Medication Refill (Patient states she is here for medication refills. Patient states she is feeling well and doing good.)         History of Present Illness / Problem Focused Workflow     78 y/o female presents to clinic for medication refill and follow up. States he is doing good. No complaints at present      Review of Systems     @Review of Systems   Constitutional:  Negative for chills, fatigue and fever.   HENT:  Negative for nasal congestion, ear discharge, ear pain, rhinorrhea, sinus pressure/congestion and sore throat.    Respiratory:  Negative for cough, chest tightness, shortness of breath, wheezing and stridor.    Cardiovascular:  Negative for palpitations and claudication.   Gastrointestinal:  Negative for abdominal pain, constipation, diarrhea, nausea, vomiting and reflux.   Genitourinary:  Negative for dysuria, flank pain, frequency, hematuria and urgency.   Musculoskeletal:  Negative for myalgias.   Neurological:  Negative for dizziness, weakness, light-headedness and headaches.   Psychiatric/Behavioral:  Negative for suicidal ideas.        Medical / Social / Family History     Past Medical History:   Diagnosis Date    Anemia     Anxiety     Diabetes mellitus, type 2     Essential hypertension, benign     Fatigue     GERD (gastroesophageal reflux disease)     Low back pain     Osteoarthritis     Other long term (current) drug therapy     Strain of muscle and tendon of back wall of thorax, initial encounter        Past Surgical History:   Procedure Laterality Date    APPENDECTOMY      CHOLECYSTECTOMY      HYSTERECTOMY      partial    LIPOMA RESECTION Left     LUNG LOBECTOMY Right     due to trauma           Medications and Allergies     Medications  Outpatient Medications Marked as Taking for  the 1/18/24 encounter (Office Visit) with Rick Albert FNP   Medication Sig Dispense Refill    ALPRAZolam (XANAX) 0.25 MG tablet Take by mouth.      blood sugar diagnostic Strp by Misc.(Non-Drug; Combo Route) route. CHECK BLOOD SUGAR ONE TIME DAILY AND AS NEEDED      ferrous sulfate (IRON, FERROUS SULFATE,) 325 mg (65 mg iron) Tab tablet Take 1 tablet (325 mg total) by mouth 2 (two) times daily. 180 tablet 3    fluticasone propionate (FLONASE) 50 mcg/actuation nasal spray USE 1 SPRAY IN EACH NOSTRIL 2 TIMES A DAY ((SHAKE WELL)) 48 g 3    multivit with min-folic acid (CENTRUM ADULT 50 PLUS) 80 mcg Chew       triamcinolone acetonide 0.1% (KENALOG) 0.1 % cream Apply topically 2 (two) times daily. 15 g 1    [DISCONTINUED] ALPRAZolam (XANAX) 0.25 MG tablet Take 1 tablet (0.25 mg total) by mouth 2 (two) times daily as needed for Anxiety. 60 tablet 2    [DISCONTINUED] amLODIPine (NORVASC) 10 MG tablet Take 1 tablet (10 mg total) by mouth once daily. 90 tablet 1    [DISCONTINUED] carvediloL (COREG) 6.25 MG tablet Take 1 tablet (6.25 mg total) by mouth 2 (two) times daily with meals. 180 tablet 1    [DISCONTINUED] cloNIDine (CATAPRES) 0.2 MG tablet Take 0.5-1 tablets (0.1-0.2 mg total) by mouth 2 (two) times daily. 180 tablet 1    [DISCONTINUED] gabapentin (NEURONTIN) 100 MG capsule Take 1 capsule (100 mg total) by mouth 3 (three) times daily. 270 capsule 1    [DISCONTINUED] hydroCHLOROthiazide (HYDRODIURIL) 25 MG tablet Take 1 tablet (25 mg total) by mouth once daily. 90 tablet 1    [DISCONTINUED] magnesium chloride (MAG 64) 64 mg TbEC Take 1 tablet (64 mg total) by mouth 2 (two) times daily. 180 tablet 1    [DISCONTINUED] meclizine (ANTIVERT) 12.5 mg tablet TAKE 1 TABLET BY MOUTH 3 TIMES A DAY AS NEEDED FOR DIZZINESS (MAY CAUSE DROWSINESS) 90 tablet 2    [DISCONTINUED] metFORMIN (GLUCOPHAGE) 500 MG tablet Take 1 tablet (500 mg total) by mouth 2 (two) times a day. 180 tablet 1    [DISCONTINUED] omeprazole (PRILOSEC) 20  MG capsule Take 1 capsule (20 mg total) by mouth 2 (two) times a day. 180 capsule 1       Allergies  Review of patient's allergies indicates:   Allergen Reactions    Ace inhibitors Anaphylaxis    Iodinated contrast media Swelling    Penicillins Anaphylaxis and Swelling    Sulfa (sulfonamide antibiotics) Hives, Rash and Swelling    Bactrim [sulfamethoxazole-trimethoprim]     Biaxin [clarithromycin]     Ciprofloxacin     Iodine and iodide containing products Hives    Mobic [meloxicam]     Trimethoprim     Iodine Rash    Latex Rash       Physical Examination     Vitals:    01/18/24 1521   BP: (!) 128/90   Pulse: 93   Resp: 19   Temp: 97.7 °F (36.5 °C)     Physical Exam  Vitals and nursing note reviewed.   Constitutional:       General: She is awake.      Appearance: Normal appearance.   HENT:      Head: Normocephalic.      Right Ear: Tympanic membrane, ear canal and external ear normal.      Left Ear: Tympanic membrane, ear canal and external ear normal.      Nose: Nose normal.      Mouth/Throat:      Lips: Pink.      Mouth: Mucous membranes are moist.      Pharynx: Oropharynx is clear. Uvula midline.   Cardiovascular:      Rate and Rhythm: Normal rate and regular rhythm.      Heart sounds: Normal heart sounds, S1 normal and S2 normal.   Pulmonary:      Effort: Pulmonary effort is normal. No respiratory distress.      Breath sounds: Normal breath sounds. No decreased breath sounds, wheezing, rhonchi or rales.   Abdominal:      General: Bowel sounds are normal.      Palpations: Abdomen is soft.      Tenderness: There is no abdominal tenderness.   Musculoskeletal:      Cervical back: Normal range of motion.   Skin:     General: Skin is warm.      Capillary Refill: Capillary refill takes less than 2 seconds.   Neurological:      Mental Status: She is alert and oriented to person, place, and time.   Psychiatric:         Thought Content: Thought content does not include homicidal or suicidal ideation. Thought content does  not include homicidal or suicidal plan.               Procedures   Assessment and Plan (including Health Maintenance)   :    Plan:     Problem List Items Addressed This Visit          Neuro    Diabetic mononeuropathy associated with type 2 diabetes mellitus    Current Assessment & Plan     Well controlled on Neurontin. Continue at current dosage. Follow up in 3 months or as needed.          Relevant Medications    gabapentin (NEURONTIN) 100 MG capsule    metFORMIN (GLUCOPHAGE) 500 MG tablet       Psychiatric    Anxiety    Current Assessment & Plan     Well controlled on Xanax BID PRN. Continue on as needed basis. Follow up in 3 months or as needed.          Relevant Medications    ALPRAZolam (XANAX) 0.25 MG tablet       Cardiac/Vascular    Essential hypertension, benign    Current Assessment & Plan     Blood pressure well controlled. Continue current medications. Follow up in 3 months or as needed.            Relevant Medications    amLODIPine (NORVASC) 10 MG tablet    carvediloL (COREG) 6.25 MG tablet    cloNIDine (CATAPRES) 0.2 MG tablet    hydroCHLOROthiazide (HYDRODIURIL) 25 MG tablet    magnesium chloride (MAG 64) 64 mg TbEC       Endocrine    Diabetes mellitus, type 2    Current Assessment & Plan     HgbA1C obtained at today's visit. Goal is less than 7. Continue current meds and low carb/no concentrated sweets diet with increased exercise as tolerated. Will follow up with lab results. Patient to follow up in 3 months or as needed.            Relevant Medications    metFORMIN (GLUCOPHAGE) 500 MG tablet       GI    GERD (gastroesophageal reflux disease)    Current Assessment & Plan     Well controlled on Omeprazole. Continue at current dosage. Follow up in 3 months or as needed.         Relevant Medications    omeprazole (PRILOSEC) 20 MG capsule       Palliative Care    Other long term (current) drug therapy - Primary    Relevant Orders    POCT Urine Drug Screen Presump (Completed)     Other Visit Diagnoses        Dizziness        Relevant Medications    meclizine (ANTIVERT) 12.5 mg tablet            Health Maintenance Topics with due status: Not Due       Topic Last Completion Date    Eye Exam 07/11/2023    Diabetes Urine Screening 07/24/2023    Foot Exam 10/24/2023    Lipid Panel 10/24/2023    Hemoglobin A1c 10/24/2023       Future Appointments   Date Time Provider Department Center   1/16/2025  2:00 PM [facility] NURSE [facility] [place] [facility]        Health Maintenance Due   Topic Date Due    Pneumococcal Vaccines (Age 65+) (1 of 2 - PCV) Never done    TETANUS VACCINE  Never done    DEXA Scan  Never done    Shingles Vaccine (1 of 2) Never done    RSV Vaccine (Age 60+ and Pregnant patients) (1 - 1-dose 60+ series) Never done    COVID-19 Vaccine (4 - 2023-24 season) 09/01/2023        No follow-ups on file.     Signature:  WHIT DOUGLAS  [facility]  [address] [place] [address], [place]  [id number]    Date of encounter: 1/18/24

## 2024-01-18 NOTE — PROGRESS NOTES
I discussed care gaps with patient and she declined and said she would think about them in the future.

## 2024-01-31 PROBLEM — Z00.00 ENCOUNTER FOR SUBSEQUENT ANNUAL WELLNESS VISIT (AWV) IN MEDICARE PATIENT: Status: ACTIVE | Noted: 2024-01-31

## 2024-02-02 NOTE — ASSESSMENT & PLAN NOTE
Well controlled on Neurontin. Continue at current dosage. Follow up in 3 months or as needed.    negative

## 2024-04-09 DIAGNOSIS — Z71.89 COMPLEX CARE COORDINATION: ICD-10-CM

## 2024-04-18 ENCOUNTER — OFFICE VISIT (OUTPATIENT)
Dept: FAMILY MEDICINE | Facility: CLINIC | Age: 79
End: 2024-04-18
Payer: MEDICARE

## 2024-04-18 VITALS
TEMPERATURE: 98 F | HEIGHT: 64 IN | RESPIRATION RATE: 20 BRPM | DIASTOLIC BLOOD PRESSURE: 60 MMHG | OXYGEN SATURATION: 97 % | BODY MASS INDEX: 24.38 KG/M2 | SYSTOLIC BLOOD PRESSURE: 100 MMHG | HEART RATE: 87 BPM | WEIGHT: 142.81 LBS

## 2024-04-18 DIAGNOSIS — R21 RASH: ICD-10-CM

## 2024-04-18 DIAGNOSIS — R79.9 ABNORMAL FINDING OF BLOOD CHEMISTRY, UNSPECIFIED: ICD-10-CM

## 2024-04-18 DIAGNOSIS — I10 ESSENTIAL HYPERTENSION, BENIGN: ICD-10-CM

## 2024-04-18 DIAGNOSIS — R42 DIZZINESS: ICD-10-CM

## 2024-04-18 DIAGNOSIS — E78.2 MIXED HYPERLIPIDEMIA: Primary | ICD-10-CM

## 2024-04-18 DIAGNOSIS — E11.9 TYPE 2 DIABETES MELLITUS WITHOUT COMPLICATION, WITHOUT LONG-TERM CURRENT USE OF INSULIN: ICD-10-CM

## 2024-04-18 DIAGNOSIS — F41.9 ANXIETY: ICD-10-CM

## 2024-04-18 LAB
ALBUMIN SERPL BCP-MCNC: 4 G/DL (ref 3.5–5)
ALBUMIN/GLOB SERPL: 1 {RATIO}
ALP SERPL-CCNC: 98 U/L (ref 55–142)
ALT SERPL W P-5'-P-CCNC: 23 U/L (ref 13–56)
ANION GAP SERPL CALCULATED.3IONS-SCNC: 12 MMOL/L (ref 7–16)
AST SERPL W P-5'-P-CCNC: 14 U/L (ref 15–37)
BASOPHILS # BLD AUTO: 0.03 K/UL (ref 0–0.2)
BASOPHILS NFR BLD AUTO: 0.5 % (ref 0–1)
BILIRUB SERPL-MCNC: 0.3 MG/DL (ref ?–1.2)
BUN SERPL-MCNC: 10 MG/DL (ref 7–18)
BUN/CREAT SERPL: 11 (ref 6–20)
CALCIUM SERPL-MCNC: 10 MG/DL (ref 8.5–10.1)
CHLORIDE SERPL-SCNC: 99 MMOL/L (ref 98–107)
CHOLEST SERPL-MCNC: 216 MG/DL (ref 0–200)
CHOLEST/HDLC SERPL: 2.8 {RATIO}
CO2 SERPL-SCNC: 27 MMOL/L (ref 21–32)
CREAT SERPL-MCNC: 0.91 MG/DL (ref 0.55–1.02)
DIFFERENTIAL METHOD BLD: ABNORMAL
EGFR (NO RACE VARIABLE) (RUSH/TITUS): 64 ML/MIN/1.73M2
EOSINOPHIL # BLD AUTO: 0.45 K/UL (ref 0–0.5)
EOSINOPHIL NFR BLD AUTO: 7.5 % (ref 1–4)
ERYTHROCYTE [DISTWIDTH] IN BLOOD BY AUTOMATED COUNT: 12.4 % (ref 11.5–14.5)
GLOBULIN SER-MCNC: 4 G/DL (ref 2–4)
GLUCOSE SERPL-MCNC: 127 MG/DL (ref 74–106)
HCT VFR BLD AUTO: 35.6 % (ref 38–47)
HDLC SERPL-MCNC: 77 MG/DL (ref 40–60)
HGB BLD-MCNC: 11.3 G/DL (ref 12–16)
IMM GRANULOCYTES # BLD AUTO: 0.01 K/UL (ref 0–0.04)
IMM GRANULOCYTES NFR BLD: 0.2 % (ref 0–0.4)
LDLC SERPL CALC-MCNC: 118 MG/DL
LDLC/HDLC SERPL: 1.5 {RATIO}
LYMPHOCYTES # BLD AUTO: 2.34 K/UL (ref 1–4.8)
LYMPHOCYTES NFR BLD AUTO: 39.2 % (ref 27–41)
MCH RBC QN AUTO: 32.1 PG (ref 27–31)
MCHC RBC AUTO-ENTMCNC: 31.7 G/DL (ref 32–36)
MCV RBC AUTO: 101.1 FL (ref 80–96)
MONOCYTES # BLD AUTO: 0.48 K/UL (ref 0–0.8)
MONOCYTES NFR BLD AUTO: 8 % (ref 2–6)
MPC BLD CALC-MCNC: 10.8 FL (ref 9.4–12.4)
NEUTROPHILS # BLD AUTO: 2.66 K/UL (ref 1.8–7.7)
NEUTROPHILS NFR BLD AUTO: 44.6 % (ref 53–65)
NONHDLC SERPL-MCNC: 139 MG/DL
NRBC # BLD AUTO: 0 X10E3/UL
NRBC, AUTO (.00): 0 %
PLATELET # BLD AUTO: 280 K/UL (ref 150–400)
POTASSIUM SERPL-SCNC: 4.1 MMOL/L (ref 3.5–5.1)
PROT SERPL-MCNC: 8 G/DL (ref 6.4–8.2)
RBC # BLD AUTO: 3.52 M/UL (ref 4.2–5.4)
SODIUM SERPL-SCNC: 134 MMOL/L (ref 136–145)
TRIGL SERPL-MCNC: 107 MG/DL (ref 35–150)
VLDLC SERPL-MCNC: 21 MG/DL
WBC # BLD AUTO: 5.97 K/UL (ref 4.5–11)

## 2024-04-18 PROCEDURE — 80053 COMPREHEN METABOLIC PANEL: CPT | Mod: ,,, | Performed by: CLINICAL MEDICAL LABORATORY

## 2024-04-18 PROCEDURE — 99214 OFFICE O/P EST MOD 30 MIN: CPT | Mod: ,,,

## 2024-04-18 PROCEDURE — 85025 COMPLETE CBC W/AUTO DIFF WBC: CPT | Mod: ,,, | Performed by: CLINICAL MEDICAL LABORATORY

## 2024-04-18 PROCEDURE — 80061 LIPID PANEL: CPT | Mod: ,,, | Performed by: CLINICAL MEDICAL LABORATORY

## 2024-04-18 RX ORDER — CARVEDILOL 6.25 MG/1
6.25 TABLET ORAL 2 TIMES DAILY WITH MEALS
Qty: 180 TABLET | Refills: 1 | Status: SHIPPED | OUTPATIENT
Start: 2024-04-18

## 2024-04-18 RX ORDER — TRIAMCINOLONE ACETONIDE 1 MG/G
CREAM TOPICAL 2 TIMES DAILY
Qty: 15 G | Refills: 1 | Status: SHIPPED | OUTPATIENT
Start: 2024-04-18

## 2024-04-18 RX ORDER — METFORMIN HYDROCHLORIDE 500 MG/1
500 TABLET ORAL 2 TIMES DAILY
Qty: 180 TABLET | Refills: 1 | Status: SHIPPED | OUTPATIENT
Start: 2024-04-18

## 2024-04-18 RX ORDER — HYDROCHLOROTHIAZIDE 25 MG/1
25 TABLET ORAL DAILY
Qty: 90 TABLET | Refills: 1 | Status: SHIPPED | OUTPATIENT
Start: 2024-04-18

## 2024-04-18 RX ORDER — MECLIZINE HCL 12.5 MG 12.5 MG/1
TABLET ORAL
Qty: 90 TABLET | Refills: 2 | Status: SHIPPED | OUTPATIENT
Start: 2024-04-18

## 2024-04-18 RX ORDER — AMLODIPINE BESYLATE 10 MG/1
10 TABLET ORAL DAILY
Qty: 90 TABLET | Refills: 1 | Status: SHIPPED | OUTPATIENT
Start: 2024-04-18

## 2024-04-18 RX ORDER — ALPRAZOLAM 0.25 MG/1
0.25 TABLET ORAL 2 TIMES DAILY PRN
Qty: 60 TABLET | Refills: 2 | Status: SHIPPED | OUTPATIENT
Start: 2024-04-18

## 2024-04-18 NOTE — PROGRESS NOTES
WHIT DOUGLAS   [facility]  [address]  [place]  [address]      PATIENT NAME: Yana Oates  : 1945  DATE: 24  MRN: [id number]        Reason for Visit / Chief Complaint: Follow-up (Patient is an 79 year old female who presents to the clinic related to 3 month follow up. ), Leg Pain (Patient reports she still has left upper leg pain, stopped taking gabapentin 100 mg related to dizziness.  ), Dizziness (Patient reports she is still having some dizziness, taking antivert 2 times daily with no relief. Patient requesting to check iron levels. ), and caregaps (Patient declines care gaps at this time. )         History of Present Illness / Problem Focused Workflow     78 y/o female presents to clinic for follow up and medication refills. State she is still having mild left leg/thigh pain but it is relieved with OTC medication. She is complaining of dizziness randomly and would like lab work today. She states the antivert helps with the dizziness. She denies any other concerns or questions at present.       Review of Systems     @Review of Systems   Constitutional:  Negative for chills, fatigue and fever.   HENT:  Negative for nasal congestion, ear discharge, ear pain, rhinorrhea, sinus pressure/congestion and sore throat.    Respiratory:  Negative for cough, chest tightness, shortness of breath, wheezing and stridor.    Cardiovascular:  Negative for palpitations and claudication.   Gastrointestinal:  Negative for abdominal pain, constipation, diarrhea, nausea, vomiting and reflux.   Genitourinary:  Negative for dysuria, flank pain, frequency, hematuria and urgency.   Musculoskeletal:  Positive for leg pain. Negative for myalgias.   Neurological:  Positive for dizziness. Negative for weakness, light-headedness and headaches.   Psychiatric/Behavioral:  Negative for suicidal ideas.        Medical / Social / Family History     Past Medical History:   Diagnosis Date    Anemia     Anxiety      Diabetes mellitus, type 2     Essential hypertension, benign     Fatigue     GERD (gastroesophageal reflux disease)     Low back pain     Osteoarthritis     Other long term (current) drug therapy     Strain of muscle and tendon of back wall of thorax, initial encounter        Past Surgical History:   Procedure Laterality Date    APPENDECTOMY      CHOLECYSTECTOMY      HYSTERECTOMY      partial    LIPOMA RESECTION Left     LUNG LOBECTOMY Right     due to trauma           Medications and Allergies     Medications  Current Outpatient Medications   Medication Sig Dispense Refill    blood sugar diagnostic Strp by Misc.(Non-Drug; Combo Route) route. CHECK BLOOD SUGAR ONE TIME DAILY AND AS NEEDED      cloNIDine (CATAPRES) 0.2 MG tablet Take 0.5-1 tablets (0.1-0.2 mg total) by mouth 2 (two) times daily. 180 tablet 1    ferrous sulfate (IRON, FERROUS SULFATE,) 325 mg (65 mg iron) Tab tablet Take 1 tablet (325 mg total) by mouth 2 (two) times daily. 180 tablet 3    fluticasone propionate (FLONASE) 50 mcg/actuation nasal spray USE 1 SPRAY IN EACH NOSTRIL 2 TIMES A DAY ((SHAKE WELL)) 48 g 3    magnesium chloride (MAG 64) 64 mg TbEC Take 1 tablet (64 mg total) by mouth 2 (two) times daily. 180 tablet 1    multivit with min-folic acid (CENTRUM ADULT 50 PLUS) 80 mcg Chew       multivitamin-iron-folic acid Tab Take 1 tablet by mouth once daily.      omeprazole (PRILOSEC) 20 MG capsule Take 1 capsule (20 mg total) by mouth 2 (two) times a day. 180 capsule 1    ALPRAZolam (XANAX) 0.25 MG tablet Take 1 tablet (0.25 mg total) by mouth 2 (two) times daily as needed for Anxiety. 60 tablet 2    amLODIPine (NORVASC) 10 MG tablet Take 1 tablet (10 mg total) by mouth once daily. 90 tablet 1    carvediloL (COREG) 6.25 MG tablet Take 1 tablet (6.25 mg total) by mouth 2 (two) times daily with meals. 180 tablet 1    hydroCHLOROthiazide (HYDRODIURIL) 25 MG tablet Take 1 tablet (25 mg total) by mouth once daily. 90 tablet 1    meclizine  (ANTIVERT) 12.5 mg tablet Take one tablet by mouth 3 times daily as needed for dizziness 90 tablet 2    metFORMIN (GLUCOPHAGE) 500 MG tablet Take 1 tablet (500 mg total) by mouth 2 (two) times a day. 180 tablet 1    triamcinolone acetonide 0.1% (KENALOG) 0.1 % cream Apply topically 2 (two) times daily. 15 g 1     No current facility-administered medications for this visit.       Allergies  Review of patient's allergies indicates:   Allergen Reactions    Ace inhibitors Anaphylaxis    Iodinated contrast media Swelling    Penicillins Anaphylaxis and Swelling    Sulfa (sulfonamide antibiotics) Hives, Rash and Swelling    Bactrim [sulfamethoxazole-trimethoprim]     Biaxin [clarithromycin]     Ciprofloxacin     Iodine and iodide containing products Hives    Mobic [meloxicam]     Trimethoprim     Iodine Rash    Latex Rash       Physical Examination     Vitals:    04/18/24 0830   BP: 100/60   Pulse: 87   Resp: 20   Temp: 98.1 °F (36.7 °C)     Physical Exam  Vitals and nursing note reviewed.   Constitutional:       General: She is awake.      Appearance: Normal appearance.   HENT:      Head: Normocephalic.      Right Ear: Tympanic membrane, ear canal and external ear normal.      Left Ear: Tympanic membrane, ear canal and external ear normal.      Nose: Nose normal.      Mouth/Throat:      Lips: Pink.      Mouth: Mucous membranes are moist.      Pharynx: Oropharynx is clear. Uvula midline.   Cardiovascular:      Rate and Rhythm: Normal rate and regular rhythm.      Heart sounds: Normal heart sounds, S1 normal and S2 normal.   Pulmonary:      Effort: Pulmonary effort is normal. No respiratory distress.      Breath sounds: Normal breath sounds. No decreased breath sounds, wheezing, rhonchi or rales.   Abdominal:      General: Bowel sounds are normal.      Palpations: Abdomen is soft.      Tenderness: There is no abdominal tenderness.   Musculoskeletal:      Cervical back: Normal range of motion.   Skin:     General: Skin is  warm.      Capillary Refill: Capillary refill takes less than 2 seconds.   Neurological:      Mental Status: She is alert and oriented to person, place, and time.   Psychiatric:         Thought Content: Thought content does not include homicidal or suicidal ideation. Thought content does not include homicidal or suicidal plan.               Procedures   Assessment and Plan (including Health Maintenance)   :    Plan:     Problem List Items Addressed This Visit          Psychiatric    Anxiety    Current Assessment & Plan     Well controlled on Xanax BID PRN. Continue on as needed basis. Follow up in 3 months or as needed.          Relevant Medications    ALPRAZolam (XANAX) 0.25 MG tablet       Derm    Rash    Current Assessment & Plan     Triamcinolone refilled today         Relevant Medications    triamcinolone acetonide 0.1% (KENALOG) 0.1 % cream       Cardiac/Vascular    Essential hypertension, benign    Current Assessment & Plan     Blood pressure well controlled. Continue current medications. Follow up in 3 months or as needed.            Relevant Medications    amLODIPine (NORVASC) 10 MG tablet    carvediloL (COREG) 6.25 MG tablet    hydroCHLOROthiazide (HYDRODIURIL) 25 MG tablet    Other Relevant Orders    CBC Auto Differential    Comprehensive Metabolic Panel    Hyperlipidemia - Primary    Current Assessment & Plan     Lipid panel obtained at today's visit. Goal LDL is less than 70. Continue current meds and low fat/low cholesterol diet with increased exercise as tolerated. Will follow up with labs. Patient to follow up in 3 months or as needed.            Relevant Orders    Lipid Panel       Endocrine    Diabetes mellitus, type 2    Current Assessment & Plan      Goal is less than 7. Continue current meds and low carb/no concentrated sweets diet with increased exercise as tolerated. Will follow up with lab results. Patient to follow up in 3 months or as needed.            Relevant Medications    metFORMIN  (GLUCOPHAGE) 500 MG tablet     Other Visit Diagnoses       Dizziness        Relevant Medications    meclizine (ANTIVERT) 12.5 mg tablet    Other Relevant Orders    Iron and TIBC    Abnormal finding of blood chemistry, unspecified        Relevant Orders    Iron and TIBC            Health Maintenance Topics with due status: Not Due       Topic Last Completion Date    Diabetes Urine Screening 07/24/2023    Foot Exam 10/24/2023    Lipid Panel 10/24/2023       Future Appointments   Date Time Provider Department Center   1/16/2025  2:00 PM AWV NURSE [facility]        Health Maintenance Due   Topic Date Due    Pneumococcal Vaccines (Age 65+) (1 of 2 - PCV) Never done    TETANUS VACCINE  Never done    DEXA Scan  Never done    Shingles Vaccine (1 of 2) Never done    RSV Vaccine (Age 60+ and Pregnant patients) (1 - 1-dose 60+ series) Never done    COVID-19 Vaccine (4 - 2023-24 season) 09/01/2023    Hemoglobin A1c  04/24/2024    Eye Exam  07/11/2024        Follow up if symptoms worsen or fail to improve.     Signature:  WHIT DOUGLAS  [facility]  [address] [address] [place]  [address]    Date of encounter: 4/18/24

## 2024-04-18 NOTE — ASSESSMENT & PLAN NOTE
Goal is less than 7. Continue current meds and low carb/no concentrated sweets diet with increased exercise as tolerated. Will follow up with lab results. Patient to follow up in 3 months or as needed.      Reviewed with patient in detail, Hospitalist, Surgical PA's and today's RN team.

## 2024-05-06 PROBLEM — Z00.00 ENCOUNTER FOR SUBSEQUENT ANNUAL WELLNESS VISIT (AWV) IN MEDICARE PATIENT: Status: RESOLVED | Noted: 2024-01-31 | Resolved: 2024-05-06

## 2024-05-30 DIAGNOSIS — D50.9 IRON DEFICIENCY ANEMIA, UNSPECIFIED IRON DEFICIENCY ANEMIA TYPE: ICD-10-CM

## 2024-05-30 RX ORDER — FERROUS SULFATE 325(65) MG
325 TABLET ORAL DAILY
Qty: 90 TABLET | Refills: 0 | Status: SHIPPED | OUTPATIENT
Start: 2024-05-30

## 2024-07-18 ENCOUNTER — OFFICE VISIT (OUTPATIENT)
Dept: FAMILY MEDICINE | Facility: CLINIC | Age: 79
End: 2024-07-18
Payer: MEDICARE

## 2024-07-18 VITALS
HEART RATE: 90 BPM | BODY MASS INDEX: 24.24 KG/M2 | DIASTOLIC BLOOD PRESSURE: 68 MMHG | SYSTOLIC BLOOD PRESSURE: 106 MMHG | WEIGHT: 142 LBS | HEIGHT: 64 IN | TEMPERATURE: 98 F | RESPIRATION RATE: 17 BRPM | OXYGEN SATURATION: 98 %

## 2024-07-18 DIAGNOSIS — Z79.899 HIGH RISK MEDICATION USE: ICD-10-CM

## 2024-07-18 DIAGNOSIS — I10 ESSENTIAL HYPERTENSION, BENIGN: ICD-10-CM

## 2024-07-18 DIAGNOSIS — F41.9 ANXIETY: Primary | ICD-10-CM

## 2024-07-18 DIAGNOSIS — E11.41 DIABETIC MONONEUROPATHY ASSOCIATED WITH TYPE 2 DIABETES MELLITUS: ICD-10-CM

## 2024-07-18 DIAGNOSIS — E11.9 TYPE 2 DIABETES MELLITUS WITHOUT COMPLICATION, WITHOUT LONG-TERM CURRENT USE OF INSULIN: ICD-10-CM

## 2024-07-18 DIAGNOSIS — R21 RASH: ICD-10-CM

## 2024-07-18 DIAGNOSIS — D50.9 IRON DEFICIENCY ANEMIA, UNSPECIFIED IRON DEFICIENCY ANEMIA TYPE: ICD-10-CM

## 2024-07-18 LAB
ALBUMIN SERPL BCP-MCNC: 4.1 G/DL (ref 3.5–5)
ALBUMIN/GLOB SERPL: 1.2 {RATIO}
ALP SERPL-CCNC: 112 U/L (ref 55–142)
ALT SERPL W P-5'-P-CCNC: 16 U/L (ref 13–56)
ANION GAP SERPL CALCULATED.3IONS-SCNC: 11 MMOL/L (ref 7–16)
AST SERPL W P-5'-P-CCNC: 8 U/L (ref 15–37)
BASOPHILS # BLD AUTO: 0.03 K/UL (ref 0–0.2)
BASOPHILS NFR BLD AUTO: 0.5 % (ref 0–1)
BILIRUB SERPL-MCNC: 0.3 MG/DL (ref ?–1.2)
BUN SERPL-MCNC: 14 MG/DL (ref 7–18)
BUN/CREAT SERPL: 14 (ref 6–20)
CALCIUM SERPL-MCNC: 9.7 MG/DL (ref 8.5–10.1)
CHLORIDE SERPL-SCNC: 97 MMOL/L (ref 98–107)
CHOLEST SERPL-MCNC: 206 MG/DL (ref 0–200)
CHOLEST/HDLC SERPL: 2.7 {RATIO}
CO2 SERPL-SCNC: 27 MMOL/L (ref 21–32)
CREAT SERPL-MCNC: 0.99 MG/DL (ref 0.55–1.02)
CREAT UR-MCNC: 62 MG/DL (ref 28–219)
CTP QC/QA: YES
DIFFERENTIAL METHOD BLD: ABNORMAL
EGFR (NO RACE VARIABLE) (RUSH/TITUS): 57 ML/MIN/1.73M2
EOSINOPHIL # BLD AUTO: 0.38 K/UL (ref 0–0.5)
EOSINOPHIL NFR BLD AUTO: 6.6 % (ref 1–4)
ERYTHROCYTE [DISTWIDTH] IN BLOOD BY AUTOMATED COUNT: 12.5 % (ref 11.5–14.5)
EST. AVERAGE GLUCOSE BLD GHB EST-MCNC: 134 MG/DL
GLOBULIN SER-MCNC: 3.3 G/DL (ref 2–4)
GLUCOSE SERPL-MCNC: 122 MG/DL (ref 74–106)
HBA1C MFR BLD HPLC: 6.3 % (ref 4.5–6.6)
HCT VFR BLD AUTO: 34.3 % (ref 38–47)
HDLC SERPL-MCNC: 77 MG/DL (ref 40–60)
HGB BLD-MCNC: 11.2 G/DL (ref 12–16)
IMM GRANULOCYTES # BLD AUTO: 0 K/UL (ref 0–0.04)
IMM GRANULOCYTES NFR BLD: 0 % (ref 0–0.4)
IRON SATN MFR SERPL: 29 % (ref 14–50)
IRON SERPL-MCNC: 72 ΜG/DL (ref 50–170)
LDLC SERPL CALC-MCNC: 111 MG/DL
LDLC/HDLC SERPL: 1.4 {RATIO}
LYMPHOCYTES # BLD AUTO: 2.4 K/UL (ref 1–4.8)
LYMPHOCYTES NFR BLD AUTO: 42 % (ref 27–41)
MCH RBC QN AUTO: 32.3 PG (ref 27–31)
MCHC RBC AUTO-ENTMCNC: 32.7 G/DL (ref 32–36)
MCV RBC AUTO: 98.8 FL (ref 80–96)
MICROALBUMIN UR-MCNC: 3.1 MG/DL (ref 0–2.8)
MICROALBUMIN/CREAT RATIO PNL UR: 50 MG/G (ref 0–30)
MONOCYTES # BLD AUTO: 0.49 K/UL (ref 0–0.8)
MONOCYTES NFR BLD AUTO: 8.6 % (ref 2–6)
MPC BLD CALC-MCNC: 10.2 FL (ref 9.4–12.4)
NEUTROPHILS # BLD AUTO: 2.42 K/UL (ref 1.8–7.7)
NEUTROPHILS NFR BLD AUTO: 42.3 % (ref 53–65)
NONHDLC SERPL-MCNC: 129 MG/DL
NRBC # BLD AUTO: 0 X10E3/UL
NRBC, AUTO (.00): 0 %
PLATELET # BLD AUTO: 290 K/UL (ref 150–400)
POC (AMP) AMPHETAMINE: NEGATIVE
POC (BAR) BARBITURATES: NEGATIVE
POC (BUP) BUPRENORPHINE: NEGATIVE
POC (BZO) BENZODIAZEPINES: ABNORMAL
POC (COC) COCAINE: NEGATIVE
POC (MDMA) METHYLENEDIOXYMETHAMPHETAMINE 3,4: NEGATIVE
POC (MET) METHAMPHETAMINE: NEGATIVE
POC (MOP) OPIATES: NEGATIVE
POC (MTD) METHADONE: NEGATIVE
POC (OXY) OXYCODONE: NEGATIVE
POC (PCP) PHENCYCLIDINE: NEGATIVE
POC (TCA) TRICYCLIC ANTIDEPRESSANTS: NEGATIVE
POC TEMPERATURE (URINE): 96
POC THC: NEGATIVE
POTASSIUM SERPL-SCNC: 4.6 MMOL/L (ref 3.5–5.1)
PROT SERPL-MCNC: 7.4 G/DL (ref 6.4–8.2)
RBC # BLD AUTO: 3.47 M/UL (ref 4.2–5.4)
SODIUM SERPL-SCNC: 130 MMOL/L (ref 136–145)
TIBC SERPL-MCNC: 245 ΜG/DL (ref 250–450)
TRIGL SERPL-MCNC: 91 MG/DL (ref 35–150)
VLDLC SERPL-MCNC: 18 MG/DL
WBC # BLD AUTO: 5.72 K/UL (ref 4.5–11)

## 2024-07-18 PROCEDURE — 83036 HEMOGLOBIN GLYCOSYLATED A1C: CPT | Mod: ,,, | Performed by: CLINICAL MEDICAL LABORATORY

## 2024-07-18 PROCEDURE — 82043 UR ALBUMIN QUANTITATIVE: CPT | Mod: ,,, | Performed by: CLINICAL MEDICAL LABORATORY

## 2024-07-18 PROCEDURE — 85025 COMPLETE CBC W/AUTO DIFF WBC: CPT | Mod: ,,, | Performed by: CLINICAL MEDICAL LABORATORY

## 2024-07-18 PROCEDURE — 80053 COMPREHEN METABOLIC PANEL: CPT | Mod: ,,, | Performed by: CLINICAL MEDICAL LABORATORY

## 2024-07-18 PROCEDURE — 83540 ASSAY OF IRON: CPT | Mod: ,,, | Performed by: CLINICAL MEDICAL LABORATORY

## 2024-07-18 PROCEDURE — 80061 LIPID PANEL: CPT | Mod: ,,, | Performed by: CLINICAL MEDICAL LABORATORY

## 2024-07-18 PROCEDURE — 99214 OFFICE O/P EST MOD 30 MIN: CPT | Mod: ,,,

## 2024-07-18 PROCEDURE — 82570 ASSAY OF URINE CREATININE: CPT | Mod: ,,, | Performed by: CLINICAL MEDICAL LABORATORY

## 2024-07-18 PROCEDURE — 83550 IRON BINDING TEST: CPT | Mod: ,,, | Performed by: CLINICAL MEDICAL LABORATORY

## 2024-07-18 PROCEDURE — 80305 DRUG TEST PRSMV DIR OPT OBS: CPT | Mod: RHCUB

## 2024-07-18 RX ORDER — TRIAMCINOLONE ACETONIDE 1 MG/G
CREAM TOPICAL 2 TIMES DAILY
Qty: 15 G | Refills: 1 | Status: SHIPPED | OUTPATIENT
Start: 2024-07-18

## 2024-07-18 RX ORDER — AMLODIPINE BESYLATE 10 MG/1
10 TABLET ORAL DAILY
Qty: 90 TABLET | Refills: 1 | Status: SHIPPED | OUTPATIENT
Start: 2024-07-18

## 2024-07-18 RX ORDER — CARVEDILOL 6.25 MG/1
6.25 TABLET ORAL 2 TIMES DAILY WITH MEALS
Qty: 180 TABLET | Refills: 1 | Status: SHIPPED | OUTPATIENT
Start: 2024-07-18

## 2024-07-18 RX ORDER — FERROUS SULFATE 325(65) MG
325 TABLET ORAL DAILY
Qty: 90 TABLET | Refills: 0 | Status: SHIPPED | OUTPATIENT
Start: 2024-07-18

## 2024-07-18 RX ORDER — ALPRAZOLAM 0.25 MG/1
0.25 TABLET ORAL 2 TIMES DAILY PRN
Qty: 60 TABLET | Refills: 2 | Status: SHIPPED | OUTPATIENT
Start: 2024-07-18

## 2024-07-18 RX ORDER — CLONIDINE HYDROCHLORIDE 0.2 MG/1
.1-.2 TABLET ORAL 2 TIMES DAILY
Qty: 180 TABLET | Refills: 1 | Status: SHIPPED | OUTPATIENT
Start: 2024-07-18

## 2024-07-18 RX ORDER — METFORMIN HYDROCHLORIDE 500 MG/1
500 TABLET ORAL 2 TIMES DAILY
Qty: 180 TABLET | Refills: 1 | Status: SHIPPED | OUTPATIENT
Start: 2024-07-18

## 2024-07-18 NOTE — PROGRESS NOTES
Health Maintenance Discussed    Topic Date Due    Pneumococcal Vaccines (Age 65+) (1 of 2 - PCV) Pt declined today.    TETANUS VACCINE  Pt declined booster.     DEXA Scan  Discussed screening with Pt, she would like to think about it and let us know if she wants to do this.     Shingles Vaccine (1 of 2) Pt declined today. Notified her she can get this vaccine at her pharmacy.    RSV Vaccine (Age 60+ and Pregnant patients) (1 - 1-dose 60+ series) Pt declined today. Notified her she can get this vaccine at her pharmacy.       COVID-19 Vaccine (4 - 2023-24 season) Pt declined booster.     Hemoglobin A1c  Will obtain today    Eye Exam  Pt states she saw Dr. Crain last month. Will request records.     Diabetes Urine Screening  Will obtain today

## 2024-07-18 NOTE — ASSESSMENT & PLAN NOTE
Goal is less than 7. Continue current meds and low carb/no concentrated sweets diet with increased exercise as tolerated. Will follow up with lab results. Patient to follow up in 3 months or as needed.

## 2024-07-18 NOTE — ASSESSMENT & PLAN NOTE
Well controlled on Xanax BID PRN. Continue on as needed basis. Follow up in 3 months or as needed.  and UDS checked today and all appropriate

## 2024-07-18 NOTE — PROGRESS NOTES
WHIT DOUGLAS   [facility]  [address]  [place]  [address]      PATIENT NAME: Yana Oates  : 1945  DATE: 24  MRN: [id number]        Reason for Visit / Chief Complaint: Anxiety (Routine follow up visit for Xanax refill. ) and Hypertension (Routine follow up visit for chronic conditions. )         History of Present Illness / Problem Focused Workflow     80 y/o female presents to clinic for medication refills. She denies any concerns or questions at present.      Review of Systems     @Review of Systems   Constitutional:  Negative for chills, fatigue and fever.   HENT:  Negative for nasal congestion, ear discharge, ear pain, rhinorrhea, sinus pressure/congestion and sore throat.    Respiratory:  Negative for cough, chest tightness, shortness of breath, wheezing and stridor.    Cardiovascular:  Negative for palpitations and claudication.   Gastrointestinal:  Negative for abdominal pain, constipation, diarrhea, nausea, vomiting and reflux.   Genitourinary:  Negative for dysuria, flank pain, frequency, hematuria and urgency.   Musculoskeletal:  Negative for myalgias.   Neurological:  Negative for dizziness, weakness, light-headedness and headaches.   Psychiatric/Behavioral:  Negative for suicidal ideas.        Medical / Social / Family History     Past Medical History:   Diagnosis Date    Anemia     Anxiety     Diabetes mellitus, type 2     Essential hypertension, benign     Fatigue     GERD (gastroesophageal reflux disease)     Low back pain     Osteoarthritis     Other long term (current) drug therapy     Strain of muscle and tendon of back wall of thorax, initial encounter        Past Surgical History:   Procedure Laterality Date    APPENDECTOMY      CHOLECYSTECTOMY      HYSTERECTOMY      partial    LIPOMA RESECTION Left     LUNG LOBECTOMY Right     due to trauma           Medications and Allergies     Medications  Outpatient Medications Marked as Taking for the 24  encounter (Office Visit) with Rick Albert FNP   Medication Sig Dispense Refill    blood sugar diagnostic Strp by Misc.(Non-Drug; Combo Route) route. CHECK BLOOD SUGAR ONE TIME DAILY AND AS NEEDED      fluticasone propionate (FLONASE) 50 mcg/actuation nasal spray USE 1 SPRAY IN EACH NOSTRIL 2 TIMES A DAY ((SHAKE WELL)) 48 g 3    hydroCHLOROthiazide (HYDRODIURIL) 25 MG tablet Take 1 tablet (25 mg total) by mouth once daily. 90 tablet 1    magnesium chloride (MAG 64) 64 mg TbEC Take 1 tablet (64 mg total) by mouth 2 (two) times daily. 180 tablet 1    meclizine (ANTIVERT) 12.5 mg tablet Take one tablet by mouth 3 times daily as needed for dizziness 90 tablet 2    multivit with min-folic acid (CENTRUM ADULT 50 PLUS) 80 mcg Chew       multivitamin-iron-folic acid Tab Take 1 tablet by mouth once daily.      omeprazole (PRILOSEC) 20 MG capsule Take 1 capsule (20 mg total) by mouth 2 (two) times a day. 180 capsule 1    [DISCONTINUED] ALPRAZolam (XANAX) 0.25 MG tablet Take 1 tablet (0.25 mg total) by mouth 2 (two) times daily as needed for Anxiety. 60 tablet 2    [DISCONTINUED] amLODIPine (NORVASC) 10 MG tablet Take 1 tablet (10 mg total) by mouth once daily. 90 tablet 1    [DISCONTINUED] carvediloL (COREG) 6.25 MG tablet Take 1 tablet (6.25 mg total) by mouth 2 (two) times daily with meals. 180 tablet 1    [DISCONTINUED] cloNIDine (CATAPRES) 0.2 MG tablet Take 0.5-1 tablets (0.1-0.2 mg total) by mouth 2 (two) times daily. 180 tablet 1    [DISCONTINUED] ferrous sulfate (IRON, FERROUS SULFATE,) 325 mg (65 mg iron) Tab tablet Take 1 tablet (325 mg total) by mouth once daily. 90 tablet 0    [DISCONTINUED] metFORMIN (GLUCOPHAGE) 500 MG tablet Take 1 tablet (500 mg total) by mouth 2 (two) times a day. 180 tablet 1    [DISCONTINUED] triamcinolone acetonide 0.1% (KENALOG) 0.1 % cream Apply topically 2 (two) times daily. 15 g 1       Allergies  Review of patient's allergies indicates:   Allergen Reactions    Ace inhibitors  Anaphylaxis    Iodinated contrast media Swelling    Penicillins Anaphylaxis and Swelling    Sulfa (sulfonamide antibiotics) Hives, Rash and Swelling    Bactrim [sulfamethoxazole-trimethoprim]     Biaxin [clarithromycin]     Ciprofloxacin     Iodine and iodide containing products Hives    Mobic [meloxicam]     Trimethoprim     Iodine Rash    Latex Rash       Physical Examination     Vitals:    07/18/24 0828   BP: 106/68   Pulse: 90   Resp: 17   Temp: 98 °F (36.7 °C)     Physical Exam  Vitals and nursing note reviewed.   Constitutional:       General: She is awake.      Appearance: Normal appearance.   HENT:      Head: Normocephalic.      Right Ear: Tympanic membrane, ear canal and external ear normal.      Left Ear: Tympanic membrane, ear canal and external ear normal.      Nose: Nose normal.      Mouth/Throat:      Lips: Pink.      Mouth: Mucous membranes are moist.      Pharynx: Oropharynx is clear. Uvula midline.   Cardiovascular:      Rate and Rhythm: Normal rate and regular rhythm.      Heart sounds: Normal heart sounds, S1 normal and S2 normal.   Pulmonary:      Effort: Pulmonary effort is normal. No respiratory distress.      Breath sounds: Normal breath sounds. No decreased breath sounds, wheezing, rhonchi or rales.   Abdominal:      General: Bowel sounds are normal.      Palpations: Abdomen is soft.      Tenderness: There is no abdominal tenderness.   Musculoskeletal:      Cervical back: Normal range of motion.   Skin:     General: Skin is warm.      Capillary Refill: Capillary refill takes less than 2 seconds.   Neurological:      Mental Status: She is alert and oriented to person, place, and time.   Psychiatric:         Thought Content: Thought content does not include homicidal or suicidal ideation. Thought content does not include homicidal or suicidal plan.               Procedures   Assessment and Plan (including Health Maintenance)   :    Plan:     Problem List Items Addressed This Visit           Neuro    Diabetic mononeuropathy associated with type 2 diabetes mellitus    Current Assessment & Plan     Well controlled on Neurontin. Continue at current dosage. Follow up in 3 months or as needed.          Relevant Medications    metFORMIN (GLUCOPHAGE) 500 MG tablet       Psychiatric    Anxiety - Primary    Current Assessment & Plan     Well controlled on Xanax BID PRN. Continue on as needed basis. Follow up in 3 months or as needed.  and UDS checked today and all appropriate          Relevant Medications    ALPRAZolam (XANAX) 0.25 MG tablet       Derm    Rash    Current Assessment & Plan     Triamcinolone refilled today         Relevant Medications    triamcinolone acetonide 0.1% (KENALOG) 0.1 % cream       Cardiac/Vascular    Essential hypertension, benign    Current Assessment & Plan     Blood pressure well controlled. Continue current medications. Follow up in 3 months or as needed.            Relevant Medications    carvediloL (COREG) 6.25 MG tablet    cloNIDine (CATAPRES) 0.2 MG tablet    amLODIPine (NORVASC) 10 MG tablet    Other Relevant Orders    CBC Auto Differential    Comprehensive Metabolic Panel    Lipid Panel       Oncology    Iron deficiency anemia    Relevant Medications    ferrous sulfate (IRON, FERROUS SULFATE,) 325 mg (65 mg iron) Tab tablet    Other Relevant Orders    Iron and TIBC       Endocrine    Diabetes mellitus, type 2    Current Assessment & Plan      Goal is less than 7. Continue current meds and low carb/no concentrated sweets diet with increased exercise as tolerated. Will follow up with lab results. Patient to follow up in 3 months or as needed.            Relevant Medications    metFORMIN (GLUCOPHAGE) 500 MG tablet    Other Relevant Orders    Hemoglobin A1C    Microalbumin/Creatinine Ratio, Urine       Palliative Care    High risk medication use    Current Assessment & Plan      and UDS checked today and appropriate         Relevant Orders    POCT Urine Drug Screen Presump  (Completed)       Health Maintenance Topics with due status: Not Due       Topic Last Completion Date    Foot Exam 10/24/2023    Lipid Panel 04/18/2024       Future Appointments   Date Time Provider Department Center   1/16/2025  2:00 PM AWV NURSE [facility]        Health Maintenance Due   Topic Date Due    Pneumococcal Vaccines (Age 65+) (1 of 2 - PCV) Never done    TETANUS VACCINE  Never done    DEXA Scan  Never done    Shingles Vaccine (1 of 2) Never done    RSV Vaccine (Age 60+ and Pregnant patients) (1 - 1-dose 60+ series) Never done    COVID-19 Vaccine (4 - 2023-24 season) 09/01/2023    Hemoglobin A1c  04/24/2024    Eye Exam  07/11/2024    Diabetes Urine Screening  07/24/2024        Follow up in about 3 months (around 10/18/2024).     Signature:  WHIT DOUGLAS  [facility]  [address] [address] [place]  [address]    Date of encounter: 7/18/24

## 2024-08-09 DIAGNOSIS — T78.40XD ALLERGY, SUBSEQUENT ENCOUNTER: ICD-10-CM

## 2024-08-12 RX ORDER — FLUTICASONE PROPIONATE 50 MCG
SPRAY, SUSPENSION (ML) NASAL
Qty: 48 G | Refills: 3 | Status: SHIPPED | OUTPATIENT
Start: 2024-08-12

## 2024-08-28 ENCOUNTER — OFFICE VISIT (OUTPATIENT)
Dept: FAMILY MEDICINE | Facility: CLINIC | Age: 79
End: 2024-08-28
Payer: MEDICARE

## 2024-08-28 VITALS
SYSTOLIC BLOOD PRESSURE: 132 MMHG | OXYGEN SATURATION: 98 % | TEMPERATURE: 98 F | BODY MASS INDEX: 23.79 KG/M2 | WEIGHT: 139.38 LBS | HEART RATE: 102 BPM | HEIGHT: 64 IN | DIASTOLIC BLOOD PRESSURE: 80 MMHG | RESPIRATION RATE: 18 BRPM

## 2024-08-28 DIAGNOSIS — M25.50 ARTHRALGIA, UNSPECIFIED JOINT: Primary | ICD-10-CM

## 2024-08-28 PROCEDURE — 96372 THER/PROPH/DIAG INJ SC/IM: CPT | Mod: ,,,

## 2024-08-28 PROCEDURE — 99213 OFFICE O/P EST LOW 20 MIN: CPT | Mod: ,,,

## 2024-08-28 RX ORDER — METHYLPREDNISOLONE 4 MG/1
TABLET ORAL
Qty: 21 EACH | Refills: 0 | Status: SHIPPED | OUTPATIENT
Start: 2024-08-28 | End: 2024-09-18

## 2024-08-28 RX ORDER — METHYLPREDNISOLONE ACETATE 40 MG/ML
40 INJECTION, SUSPENSION INTRA-ARTICULAR; INTRALESIONAL; INTRAMUSCULAR; SOFT TISSUE ONCE
Status: COMPLETED | OUTPATIENT
Start: 2024-08-28 | End: 2024-08-28

## 2024-08-28 RX ORDER — KETOROLAC TROMETHAMINE 10 MG/1
10 TABLET, FILM COATED ORAL EVERY 6 HOURS
Qty: 20 TABLET | Refills: 0 | Status: CANCELLED | OUTPATIENT
Start: 2024-08-28 | End: 2024-09-02

## 2024-08-28 RX ORDER — KETOROLAC TROMETHAMINE 30 MG/ML
30 INJECTION, SOLUTION INTRAMUSCULAR; INTRAVENOUS
Status: CANCELLED | OUTPATIENT
Start: 2024-08-28 | End: 2024-08-28

## 2024-08-28 RX ORDER — DEXAMETHASONE SODIUM PHOSPHATE 4 MG/ML
4 INJECTION, SOLUTION INTRA-ARTICULAR; INTRALESIONAL; INTRAMUSCULAR; INTRAVENOUS; SOFT TISSUE
Status: COMPLETED | OUTPATIENT
Start: 2024-08-28 | End: 2024-08-28

## 2024-08-28 RX ADMIN — DEXAMETHASONE SODIUM PHOSPHATE 4 MG: 4 INJECTION, SOLUTION INTRA-ARTICULAR; INTRALESIONAL; INTRAMUSCULAR; INTRAVENOUS; SOFT TISSUE at 10:08

## 2024-08-28 RX ADMIN — METHYLPREDNISOLONE ACETATE 40 MG: 40 INJECTION, SUSPENSION INTRA-ARTICULAR; INTRALESIONAL; INTRAMUSCULAR; SOFT TISSUE at 10:08

## 2024-08-28 NOTE — ASSESSMENT & PLAN NOTE
Decadron and Depo Medrol IM today. Medrol dose pack RX. Medication instructions and education given with understanding voiced. RTC with worsening, new or persistent symptoms

## 2024-08-28 NOTE — PROGRESS NOTES
LANDON ALANIZ, WHIT   [facility]  [address]  [place]  [address]      PATIENT NAME: Yana Oates  : 1945  DATE: 24  MRN: [id number]        Reason for Visit / Chief Complaint: Joint Swelling (Yana Oates 79 year old female presents with hand arm shoulder and jaw pain. Reports she is unable to raise her left arm very far up /out. Bilateral hand pain with joint swelling. Right jaw pain. States all her bones and joints are hurting. She states her mom had severe arthritis pain and she is having the same pain. She has been waking up in the middle of the night crying unable to go back to sleep due to pain.)         History of Present Illness / Problem Focused Workflow     80 y/o female presents to clinic with complaints of all over joint pain and swelling. Complains of bilateral shoulder pain, right jaw pain, bilateral hand pain. Pt states she does have flare ups of arthritis at times. She denies any known injury.       Review of Systems     @Review of Systems   Constitutional:  Negative for chills, fatigue and fever.   HENT:  Negative for nasal congestion, ear discharge, ear pain, rhinorrhea, sinus pressure/congestion and sore throat.    Respiratory:  Negative for cough, chest tightness, shortness of breath, wheezing and stridor.    Cardiovascular:  Negative for palpitations and claudication.   Gastrointestinal:  Negative for abdominal pain, constipation, diarrhea, nausea, vomiting and reflux.   Genitourinary:  Negative for dysuria, flank pain, frequency, hematuria and urgency.   Musculoskeletal:  Positive for joint swelling. Negative for myalgias.   Neurological:  Negative for dizziness, weakness, light-headedness and headaches.   Psychiatric/Behavioral:  Negative for suicidal ideas.        Medical / Social / Family History     Past Medical History:   Diagnosis Date    Anemia     Anxiety     Diabetes mellitus, type 2     Essential hypertension, benign     Fatigue     GERD  (gastroesophageal reflux disease)     Low back pain     Osteoarthritis     Other long term (current) drug therapy     Strain of muscle and tendon of back wall of thorax, initial encounter        Past Surgical History:   Procedure Laterality Date    APPENDECTOMY      CHOLECYSTECTOMY      HYSTERECTOMY      partial    LIPOMA RESECTION Left     LUNG LOBECTOMY Right     due to trauma           Medications and Allergies     Medications  Outpatient Medications Marked as Taking for the 8/28/24 encounter (Office Visit) with Rick Albert FNP   Medication Sig Dispense Refill    ALPRAZolam (XANAX) 0.25 MG tablet Take 1 tablet (0.25 mg total) by mouth 2 (two) times daily as needed for Anxiety. 60 tablet 2    amLODIPine (NORVASC) 10 MG tablet Take 1 tablet (10 mg total) by mouth once daily. 90 tablet 1    blood sugar diagnostic Strp by Misc.(Non-Drug; Combo Route) route. CHECK BLOOD SUGAR ONE TIME DAILY AND AS NEEDED      carvediloL (COREG) 6.25 MG tablet Take 1 tablet (6.25 mg total) by mouth 2 (two) times daily with meals. 180 tablet 1    cloNIDine (CATAPRES) 0.2 MG tablet Take 0.5-1 tablets (0.1-0.2 mg total) by mouth 2 (two) times daily. 180 tablet 1    ferrous sulfate (IRON, FERROUS SULFATE,) 325 mg (65 mg iron) Tab tablet Take 1 tablet (325 mg total) by mouth once daily. 90 tablet 0    fluticasone propionate (FLONASE) 50 mcg/actuation nasal spray USE 1 SPRAY IN EACH NOSTRIL 2 TIMES A DAY ((SHAKE WELL)) 48 g 3    hydroCHLOROthiazide (HYDRODIURIL) 25 MG tablet Take 1 tablet (25 mg total) by mouth once daily. 90 tablet 1    magnesium chloride (MAG 64) 64 mg TbEC Take 1 tablet (64 mg total) by mouth 2 (two) times daily. 180 tablet 1    meclizine (ANTIVERT) 12.5 mg tablet Take one tablet by mouth 3 times daily as needed for dizziness 90 tablet 2    metFORMIN (GLUCOPHAGE) 500 MG tablet Take 1 tablet (500 mg total) by mouth 2 (two) times a day. 180 tablet 1    multivit with min-folic acid (CENTRUM ADULT 50 PLUS) 80 mcg Chew        multivitamin-iron-folic acid Tab Take 1 tablet by mouth once daily.      omeprazole (PRILOSEC) 20 MG capsule Take 1 capsule (20 mg total) by mouth 2 (two) times a day. 180 capsule 1    triamcinolone acetonide 0.1% (KENALOG) 0.1 % cream Apply topically 2 (two) times daily. 15 g 1     Current Facility-Administered Medications for the 8/28/24 encounter (Office Visit) with Rick Albert FNP   Medication Dose Route Frequency Provider Last Rate Last Admin    [COMPLETED] dexAMETHasone injection 4 mg  4 mg Intramuscular 1 time in Clinic/HOD Rick Albert FNP   4 mg at 08/28/24 1002    [COMPLETED] methylPREDNISolone acetate injection 40 mg  40 mg Intramuscular Once Rick Albert FNP   40 mg at 08/28/24 1002       Allergies  Review of patient's allergies indicates:   Allergen Reactions    Ace inhibitors Anaphylaxis    Iodinated contrast media Swelling    Penicillins Anaphylaxis and Swelling    Sulfa (sulfonamide antibiotics) Hives, Rash and Swelling    Bactrim [sulfamethoxazole-trimethoprim]     Biaxin [clarithromycin]     Ciprofloxacin     Iodine and iodide containing products Hives    Mobic [meloxicam]     Trimethoprim     Iodine Rash    Latex Rash       Physical Examination     Vitals:    08/28/24 0852   BP: 132/80   Pulse:    Resp:    Temp:      Physical Exam  Vitals and nursing note reviewed.   Constitutional:       General: She is awake.      Appearance: Normal appearance.   HENT:      Head: Normocephalic.      Right Ear: Tympanic membrane, ear canal and external ear normal.      Left Ear: Tympanic membrane, ear canal and external ear normal.      Nose: Nose normal.      Mouth/Throat:      Lips: Pink.      Mouth: Mucous membranes are moist.      Pharynx: Oropharynx is clear. Uvula midline.   Cardiovascular:      Rate and Rhythm: Normal rate and regular rhythm.      Heart sounds: Normal heart sounds, S1 normal and S2 normal.   Pulmonary:      Effort: Pulmonary effort is normal. No respiratory distress.       Breath sounds: Normal breath sounds. No decreased breath sounds, wheezing, rhonchi or rales.   Abdominal:      General: Bowel sounds are normal.      Palpations: Abdomen is soft.      Tenderness: There is no abdominal tenderness.   Musculoskeletal:      Cervical back: Normal range of motion.   Skin:     General: Skin is warm.      Capillary Refill: Capillary refill takes less than 2 seconds.   Neurological:      Mental Status: She is alert and oriented to person, place, and time.   Psychiatric:         Thought Content: Thought content does not include homicidal or suicidal ideation. Thought content does not include homicidal or suicidal plan.               Procedures   Assessment and Plan (including Health Maintenance)   :    Plan:     Problem List Items Addressed This Visit          Orthopedic    Arthralgia - Primary    Current Assessment & Plan     Decadron and Depo Medrol IM today. Medrol dose pack RX. Medication instructions and education given with understanding voiced. RTC with worsening, new or persistent symptoms         Relevant Medications    dexAMETHasone injection 4 mg (Completed)    methylPREDNISolone (MEDROL DOSEPACK) 4 mg tablet    methylPREDNISolone acetate injection 40 mg (Completed)       Health Maintenance Topics with due status: Not Due       Topic Last Completion Date    Diabetes Urine Screening 07/18/2024    Lipid Panel 07/18/2024    Hemoglobin A1c 07/18/2024       Future Appointments   Date Time Provider Department Center   1/16/2025  2:00 PM AWV NURSE [facility] N[facility]        Health Maintenance Due   Topic Date Due    Pneumococcal Vaccines (Age 65+) (1 of 2 - PCV) Never done    TETANUS VACCINE  Never done    DEXA Scan  Never done    Shingles Vaccine (1 of 2) Never done    RSV Vaccine (Age 60+ and Pregnant patients) (1 - 1-dose 60+ series) Never done    COVID-19 Vaccine (4 - 2023-24 season) 09/01/2023    Eye Exam  07/11/2024    Foot Exam  10/24/2024        Follow up if symptoms  worsen or fail to improve.     Signature:  WHIT DOUGLAS  [address] [place]  [address]    Date of encounter: 8/28/24

## 2024-09-10 DIAGNOSIS — R42 DIZZINESS: ICD-10-CM

## 2024-09-10 RX ORDER — MECLIZINE HCL 12.5 MG 12.5 MG/1
TABLET ORAL
Qty: 90 TABLET | Refills: 2 | Status: SHIPPED | OUTPATIENT
Start: 2024-09-10

## 2024-09-16 ENCOUNTER — TELEPHONE (OUTPATIENT)
Dept: FAMILY MEDICINE | Facility: CLINIC | Age: 79
End: 2024-09-16
Payer: MEDICARE

## 2024-09-16 DIAGNOSIS — M25.50 ARTHRALGIA, UNSPECIFIED JOINT: ICD-10-CM

## 2024-09-16 RX ORDER — METHYLPREDNISOLONE 4 MG/1
TABLET ORAL
Qty: 21 EACH | Refills: 0 | Status: SHIPPED | OUTPATIENT
Start: 2024-09-16 | End: 2024-10-07

## 2024-09-16 NOTE — TELEPHONE ENCOUNTER
Patient called clinic requesting a refill on methylPREDNISolone (MEDROL DOSEPACK) 4 mg tablet . Patient states this medication really helped her pain level stay down with her arthritis and is asking if it can be refilled. Please send to Mr. Moore in [place] if possible.

## 2024-10-01 ENCOUNTER — TELEPHONE (OUTPATIENT)
Dept: FAMILY MEDICINE | Facility: CLINIC | Age: 79
End: 2024-10-01
Payer: MEDICARE

## 2024-10-01 DIAGNOSIS — M25.50 ARTHRALGIA, UNSPECIFIED JOINT: Primary | ICD-10-CM

## 2024-10-02 ENCOUNTER — OFFICE VISIT (OUTPATIENT)
Dept: FAMILY MEDICINE | Facility: CLINIC | Age: 79
End: 2024-10-02
Payer: MEDICARE

## 2024-10-02 VITALS
RESPIRATION RATE: 18 BRPM | HEART RATE: 94 BPM | OXYGEN SATURATION: 100 % | HEIGHT: 64 IN | TEMPERATURE: 98 F | BODY MASS INDEX: 23.63 KG/M2 | WEIGHT: 138.38 LBS | SYSTOLIC BLOOD PRESSURE: 146 MMHG | DIASTOLIC BLOOD PRESSURE: 95 MMHG

## 2024-10-02 DIAGNOSIS — F41.9 ANXIETY: ICD-10-CM

## 2024-10-02 DIAGNOSIS — Z13.820 SCREENING FOR OSTEOPOROSIS: Primary | ICD-10-CM

## 2024-10-02 DIAGNOSIS — M81.0 AGE-RELATED OSTEOPOROSIS WITHOUT CURRENT PATHOLOGICAL FRACTURE: ICD-10-CM

## 2024-10-02 DIAGNOSIS — I10 ESSENTIAL HYPERTENSION, BENIGN: ICD-10-CM

## 2024-10-02 DIAGNOSIS — D50.9 IRON DEFICIENCY ANEMIA, UNSPECIFIED IRON DEFICIENCY ANEMIA TYPE: ICD-10-CM

## 2024-10-02 DIAGNOSIS — K21.9 GASTROESOPHAGEAL REFLUX DISEASE, UNSPECIFIED WHETHER ESOPHAGITIS PRESENT: ICD-10-CM

## 2024-10-02 DIAGNOSIS — E11.9 TYPE 2 DIABETES MELLITUS WITHOUT COMPLICATION, WITHOUT LONG-TERM CURRENT USE OF INSULIN: ICD-10-CM

## 2024-10-02 DIAGNOSIS — M25.50 ARTHRALGIA, UNSPECIFIED JOINT: ICD-10-CM

## 2024-10-02 DIAGNOSIS — Z79.899 HIGH RISK MEDICATION USE: ICD-10-CM

## 2024-10-02 LAB
CTP QC/QA: YES
POC (AMP) AMPHETAMINE: NEGATIVE
POC (BAR) BARBITURATES: NEGATIVE
POC (BUP) BUPRENORPHINE: NEGATIVE
POC (BZO) BENZODIAZEPINES: ABNORMAL
POC (COC) COCAINE: NEGATIVE
POC (MDMA) METHYLENEDIOXYMETHAMPHETAMINE 3,4: NEGATIVE
POC (MET) METHAMPHETAMINE: NEGATIVE
POC (MOP) OPIATES: NEGATIVE
POC (MTD) METHADONE: NEGATIVE
POC (OXY) OXYCODONE: NEGATIVE
POC (PCP) PHENCYCLIDINE: NEGATIVE
POC (TCA) TRICYCLIC ANTIDEPRESSANTS: NEGATIVE
POC TEMPERATURE (URINE): 98
POC THC: NEGATIVE

## 2024-10-02 PROCEDURE — 80305 DRUG TEST PRSMV DIR OPT OBS: CPT | Mod: RHCUB

## 2024-10-02 PROCEDURE — 96372 THER/PROPH/DIAG INJ SC/IM: CPT | Mod: ,,,

## 2024-10-02 PROCEDURE — 99214 OFFICE O/P EST MOD 30 MIN: CPT | Mod: ,,,

## 2024-10-02 RX ORDER — HYDROCHLOROTHIAZIDE 25 MG/1
25 TABLET ORAL DAILY
Qty: 90 TABLET | Refills: 1 | Status: SHIPPED | OUTPATIENT
Start: 2024-10-02

## 2024-10-02 RX ORDER — METFORMIN HYDROCHLORIDE 500 MG/1
500 TABLET ORAL 2 TIMES DAILY
Qty: 180 TABLET | Refills: 1 | Status: SHIPPED | OUTPATIENT
Start: 2024-10-02

## 2024-10-02 RX ORDER — METHYLPREDNISOLONE ACETATE 40 MG/ML
40 INJECTION, SUSPENSION INTRA-ARTICULAR; INTRALESIONAL; INTRAMUSCULAR; SOFT TISSUE
Status: COMPLETED | OUTPATIENT
Start: 2024-10-02 | End: 2024-10-02

## 2024-10-02 RX ORDER — ALPRAZOLAM 0.25 MG/1
0.25 TABLET ORAL 2 TIMES DAILY PRN
Qty: 60 TABLET | Refills: 2 | Status: SHIPPED | OUTPATIENT
Start: 2024-10-02

## 2024-10-02 RX ORDER — METHYLPREDNISOLONE 4 MG/1
TABLET ORAL
Qty: 21 EACH | Refills: 0 | Status: SHIPPED | OUTPATIENT
Start: 2024-10-02 | End: 2024-10-23

## 2024-10-02 RX ORDER — OMEPRAZOLE 20 MG/1
20 CAPSULE, DELAYED RELEASE ORAL 2 TIMES DAILY
Qty: 180 CAPSULE | Refills: 1 | Status: SHIPPED | OUTPATIENT
Start: 2024-10-02

## 2024-10-02 RX ORDER — DEXAMETHASONE SODIUM PHOSPHATE 4 MG/ML
4 INJECTION, SOLUTION INTRA-ARTICULAR; INTRALESIONAL; INTRAMUSCULAR; INTRAVENOUS; SOFT TISSUE
Status: COMPLETED | OUTPATIENT
Start: 2024-10-02 | End: 2024-10-02

## 2024-10-02 RX ORDER — FERROUS SULFATE 325(65) MG
325 TABLET ORAL DAILY
Qty: 90 TABLET | Refills: 0 | Status: SHIPPED | OUTPATIENT
Start: 2024-10-02

## 2024-10-02 RX ADMIN — METHYLPREDNISOLONE ACETATE 40 MG: 40 INJECTION, SUSPENSION INTRA-ARTICULAR; INTRALESIONAL; INTRAMUSCULAR; SOFT TISSUE at 10:10

## 2024-10-02 RX ADMIN — DEXAMETHASONE SODIUM PHOSPHATE 4 MG: 4 INJECTION, SOLUTION INTRA-ARTICULAR; INTRALESIONAL; INTRAMUSCULAR; INTRAVENOUS; SOFT TISSUE at 10:10

## 2024-10-02 NOTE — PROGRESS NOTES
LANDON ALANIZ, WHIT   [place] [id number]  [place]  [address]      PATIENT NAME: Yana Oates  : 1945  DATE: 10/2/24  MRN: [id number]        Reason for Visit / Chief Complaint: Arthritis (Patient is Yana Oates an 78 y/o female who reports she is having extreme arthritis pain and can't sleep at night. She reports she tried putting arthritis cream all over but it's not helping to relieve the pain. Patient states she's also still waiting to hear back from the referral to the arthritis specialist no one has called her. ), Edema (Patient states last night her her ankles started swelling. Patient states she tried to prop them up and rub them.), and Health Maintenance (Pneumococcal Vaccines (Age 65+)(1 of 2 - PCV) Patient is thinking about it /TETANUS VACCINE Patient declines/DEXA Scan - Patient wants scheduled/Shingles Vaccine(1 of 2) patient declines/RSV Vaccine (Age 60+ and Pregnant patients)(1 - 1-dose 75+ series) Patient declines/Eye Exam due on 2024/COVID-19 Vaccine( season) due on 2024/Foot Exam due on 10/24/2024/)         History of Present Illness / Problem Focused Workflow      78 y/o female who reports she is having extreme arthritis pain and can't sleep at night. She reports she tried putting arthritis cream all over but it's not helping to relieve the pain. Patient states she's also still waiting to hear back from the referral to the arthritis specialist no one has called her. ), Edema (Patient states last night her her ankles started swelling. Patient states she tried to prop them up and rub them.    Review of Systems     @Review of Systems   Constitutional:  Negative for chills, fatigue and fever.   HENT:  Negative for nasal congestion, ear discharge, ear pain, rhinorrhea, sinus pressure/congestion and sore throat.    Respiratory:  Negative for cough, chest tightness, shortness of breath, wheezing and stridor.    Cardiovascular:  Negative for  palpitations and claudication.   Gastrointestinal:  Negative for abdominal pain, constipation, diarrhea, nausea, vomiting and reflux.   Genitourinary:  Negative for dysuria, flank pain, frequency, hematuria and urgency.   Musculoskeletal:  Positive for arthralgias and joint swelling. Negative for myalgias.   Neurological:  Negative for dizziness, weakness, light-headedness and headaches.   Psychiatric/Behavioral:  Negative for suicidal ideas.        Medical / Social / Family History     Past Medical History:   Diagnosis Date    Anemia     Anxiety     Diabetes mellitus, type 2     Essential hypertension, benign     Fatigue     GERD (gastroesophageal reflux disease)     Low back pain     Osteoarthritis     Other long term (current) drug therapy     Strain of muscle and tendon of back wall of thorax, initial encounter        Past Surgical History:   Procedure Laterality Date    APPENDECTOMY      CHOLECYSTECTOMY      HYSTERECTOMY      partial    LIPOMA RESECTION Left     LUNG LOBECTOMY Right     due to trauma           Medications and Allergies     Medications  Outpatient Medications Marked as Taking for the 10/2/24 encounter (Office Visit) with Rick Albert FNP   Medication Sig Dispense Refill    amLODIPine (NORVASC) 10 MG tablet Take 1 tablet (10 mg total) by mouth once daily. 90 tablet 1    blood sugar diagnostic Strp by Misc.(Non-Drug; Combo Route) route. CHECK BLOOD SUGAR ONE TIME DAILY AND AS NEEDED      carvediloL (COREG) 6.25 MG tablet Take 1 tablet (6.25 mg total) by mouth 2 (two) times daily with meals. 180 tablet 1    cloNIDine (CATAPRES) 0.2 MG tablet Take 0.5-1 tablets (0.1-0.2 mg total) by mouth 2 (two) times daily. 180 tablet 1    fluticasone propionate (FLONASE) 50 mcg/actuation nasal spray USE 1 SPRAY IN EACH NOSTRIL 2 TIMES A DAY ((SHAKE WELL)) 48 g 3    magnesium chloride (MAG 64) 64 mg TbEC Take 1 tablet (64 mg total) by mouth 2 (two) times daily. 180 tablet 1    meclizine (ANTIVERT) 12.5 mg  tablet Take one tablet by mouth 3 times daily as needed for dizziness 90 tablet 2    multivit with min-folic acid (CENTRUM ADULT 50 PLUS) 80 mcg Chew       multivitamin-iron-folic acid Tab Take 1 tablet by mouth once daily.      triamcinolone acetonide 0.1% (KENALOG) 0.1 % cream Apply topically 2 (two) times daily. 15 g 1    [DISCONTINUED] ALPRAZolam (XANAX) 0.25 MG tablet Take 1 tablet (0.25 mg total) by mouth 2 (two) times daily as needed for Anxiety. 60 tablet 2    [DISCONTINUED] ferrous sulfate (IRON, FERROUS SULFATE,) 325 mg (65 mg iron) Tab tablet Take 1 tablet (325 mg total) by mouth once daily. 90 tablet 0    [DISCONTINUED] hydroCHLOROthiazide (HYDRODIURIL) 25 MG tablet Take 1 tablet (25 mg total) by mouth once daily. 90 tablet 1    [DISCONTINUED] metFORMIN (GLUCOPHAGE) 500 MG tablet Take 1 tablet (500 mg total) by mouth 2 (two) times a day. 180 tablet 1    [DISCONTINUED] omeprazole (PRILOSEC) 20 MG capsule Take 1 capsule (20 mg total) by mouth 2 (two) times a day. 180 capsule 1     Current Facility-Administered Medications for the 10/2/24 encounter (Office Visit) with Rick Albert FNP   Medication Dose Route Frequency Provider Last Rate Last Admin    [COMPLETED] dexAMETHasone injection 4 mg  4 mg Intramuscular 1 time in Clinic/HOD Rick Albert FNP   4 mg at 10/02/24 1000    [COMPLETED] methylPREDNISolone acetate injection 40 mg  40 mg Intramuscular 1 time in Clinic/HOD Rick Albert FNP   40 mg at 10/02/24 1000       Allergies  Review of patient's allergies indicates:   Allergen Reactions    Ace inhibitors Anaphylaxis    Iodinated contrast media Swelling    Penicillins Anaphylaxis and Swelling    Sulfa (sulfonamide antibiotics) Hives, Rash and Swelling    Bactrim [sulfamethoxazole-trimethoprim]     Biaxin [clarithromycin]     Ciprofloxacin     Iodine and iodide containing products Hives    Mobic [meloxicam]     Trimethoprim     Iodine Rash    Latex Rash       Physical Examination     Vitals:     10/02/24 0841   BP: (!) 146/95   Pulse: 94   Resp: 18   Temp: 98.2 °F (36.8 °C)     Physical Exam  Vitals and nursing note reviewed.   Constitutional:       General: She is awake.      Appearance: Normal appearance.   HENT:      Head: Normocephalic.      Right Ear: Tympanic membrane, ear canal and external ear normal.      Left Ear: Tympanic membrane, ear canal and external ear normal.      Nose: Nose normal.      Mouth/Throat:      Lips: Pink.      Mouth: Mucous membranes are moist.      Pharynx: Oropharynx is clear. Uvula midline.   Cardiovascular:      Rate and Rhythm: Normal rate and regular rhythm.      Heart sounds: Normal heart sounds, S1 normal and S2 normal.   Pulmonary:      Effort: Pulmonary effort is normal. No respiratory distress.      Breath sounds: Normal breath sounds. No decreased breath sounds, wheezing, rhonchi or rales.   Abdominal:      General: Bowel sounds are normal.      Palpations: Abdomen is soft.      Tenderness: There is no abdominal tenderness.   Musculoskeletal:      Cervical back: Normal range of motion.   Skin:     General: Skin is warm.      Capillary Refill: Capillary refill takes less than 2 seconds.   Neurological:      Mental Status: She is alert and oriented to person, place, and time.   Psychiatric:         Thought Content: Thought content does not include homicidal or suicidal ideation. Thought content does not include homicidal or suicidal plan.               Procedures   Assessment and Plan (including Health Maintenance)   :    Plan:     Problem List Items Addressed This Visit          Psychiatric    Anxiety    Current Assessment & Plan     Well controlled on Xanax BID PRN. Continue on as needed basis. Follow up in 3 months or as needed.  and UDS checked today and all appropriate          Relevant Medications    ALPRAZolam (XANAX) 0.25 MG tablet       Cardiac/Vascular    Essential hypertension, benign    Current Assessment & Plan     Blood pressure mildly elevated  today. She is in pain today and states she did not take her medication this morning. Instructed to go home and take medication and make sure to not miss a dose with understanding voiced. Continue current medications. Follow up in 3 months or as needed.            Relevant Medications    hydroCHLOROthiazide (HYDRODIURIL) 25 MG tablet       Oncology    Iron deficiency anemia    Current Assessment & Plan     Symptom controlled at present. Continue current medication regimen         Relevant Medications    ferrous sulfate (IRON, FERROUS SULFATE,) 325 mg (65 mg iron) Tab tablet       Endocrine    Diabetes mellitus, type 2    Current Assessment & Plan      Goal is less than 7. Continue current meds and low carb/no concentrated sweets diet with increased exercise as tolerated. Will follow up with lab results. Patient to follow up in 3 months or as needed.            Relevant Medications    metFORMIN (GLUCOPHAGE) 500 MG tablet       GI    GERD (gastroesophageal reflux disease)    Current Assessment & Plan     Well controlled on Omeprazole. Continue at current dosage. Follow up in 3 months or as needed.         Relevant Medications    omeprazole (PRILOSEC) 20 MG capsule       Orthopedic    Arthralgia    Current Assessment & Plan     Decadron and Depo Medrol IM today. Medrol dose pack RX. Medication instructions and education given with understanding voiced. RTC with worsening, new or persistent symptoms         Relevant Medications    methylPREDNISolone acetate injection 40 mg (Completed)    dexAMETHasone injection 4 mg (Completed)    methylPREDNISolone (MEDROL DOSEPACK) 4 mg tablet       Palliative Care    High risk medication use    Current Assessment & Plan      and UDS checked today and appropriate         Relevant Orders    POCT Urine Drug Screen Presump (Completed)     Other Visit Diagnoses       Screening for osteoporosis    -  Primary    Relevant Orders    DXA Bone Density Axial Skeleton 1 or more sites     Age-related osteoporosis without current pathological fracture        Relevant Orders    DXA Bone Density Axial Skeleton 1 or more sites            Health Maintenance Topics with due status: Not Due       Topic Last Completion Date    Diabetes Urine Screening 07/18/2024    Lipid Panel 07/18/2024    Hemoglobin A1c 07/18/2024       Future Appointments   Date Time Provider Department Center   1/16/2025  2:00 PM [facility]        Health Maintenance Due   Topic Date Due    Pneumococcal Vaccines (Age 65+) (1 of 2 - PCV) Never done    TETANUS VACCINE  Never done    DEXA Scan  Never done    Shingles Vaccine (1 of 2) Never done    RSV Vaccine (Age 60+ and Pregnant patients) (1 - 1-dose 75+ series) Never done    Eye Exam  07/11/2024    COVID-19 Vaccine (4 - 2024-25 season) 09/01/2024    Foot Exam  10/24/2024        Follow up in about 3 months (around 1/2/2025), or if symptoms worsen or fail to improve.     Signature:  WHIT DOUGLAS  [address] [place]  [address]    Date of encounter: 10/2/24

## 2024-10-02 NOTE — ASSESSMENT & PLAN NOTE
Blood pressure mildly elevated today. She is in pain today and states she did not take her medication this morning. Instructed to go home and take medication and make sure to not miss a dose with understanding voiced. Continue current medications. Follow up in 3 months or as needed.

## 2024-10-03 DIAGNOSIS — M25.50 ARTHRALGIA, UNSPECIFIED JOINT: Primary | ICD-10-CM

## 2024-10-11 ENCOUNTER — TELEPHONE (OUTPATIENT)
Dept: FAMILY MEDICINE | Facility: CLINIC | Age: 79
End: 2024-10-11
Payer: MEDICARE

## 2024-10-11 VITALS — SYSTOLIC BLOOD PRESSURE: 139 MMHG | DIASTOLIC BLOOD PRESSURE: 86 MMHG

## 2024-11-07 ENCOUNTER — HOSPITAL ENCOUNTER (OUTPATIENT)
Dept: RADIOLOGY | Facility: HOSPITAL | Age: 79
Discharge: HOME | End: 2024-11-07
Payer: MEDICARE

## 2024-11-07 DIAGNOSIS — M81.0 AGE-RELATED OSTEOPOROSIS WITHOUT CURRENT PATHOLOGICAL FRACTURE: ICD-10-CM

## 2024-11-07 DIAGNOSIS — Z13.820 SCREENING FOR OSTEOPOROSIS: ICD-10-CM

## 2024-11-07 PROCEDURE — 77080 DXA BONE DENSITY AXIAL: CPT | Mod: TC

## 2024-11-14 DIAGNOSIS — M81.6 LOCALIZED OSTEOPOROSIS WITHOUT CURRENT PATHOLOGICAL FRACTURE: Primary | ICD-10-CM

## 2024-11-14 DIAGNOSIS — M81.8 AGE-RELATED OSTEOPOROSIS WITHOUT FRACTURE: ICD-10-CM

## 2024-11-15 ENCOUNTER — TELEPHONE (OUTPATIENT)
Dept: FAMILY MEDICINE | Facility: CLINIC | Age: 79
End: 2024-11-15
Payer: MEDICARE

## 2024-11-15 NOTE — TELEPHONE ENCOUNTER
Patients daughter called, she has noticed a considerable decline in her mother since last visit, she is requesting a rx for a walker and raised toilet seat, she is having trouble getting up from a seated position on her own, she thinks if she had something to help her push herself up on, it would help.  She was recently diagnosed with osteoporosis, she has noticed her slumping more with more complaints of pain. Requesting we send these orders to [facility].

## 2024-11-20 NOTE — TELEPHONE ENCOUNTER
Spoke with patient's daughter. Notified her that patient must be seen to be evaluated for requested DME. Scheduled an appointment to RTC clinic.

## 2024-11-21 DIAGNOSIS — I10 ESSENTIAL HYPERTENSION, BENIGN: ICD-10-CM

## 2024-11-21 RX ORDER — CARVEDILOL 6.25 MG/1
6.25 TABLET ORAL 2 TIMES DAILY WITH MEALS
Qty: 180 TABLET | Refills: 1 | Status: SHIPPED | OUTPATIENT
Start: 2024-11-21

## 2024-11-27 ENCOUNTER — OFFICE VISIT (OUTPATIENT)
Dept: FAMILY MEDICINE | Facility: CLINIC | Age: 79
End: 2024-11-27
Payer: MEDICARE

## 2024-11-27 VITALS
OXYGEN SATURATION: 98 % | HEART RATE: 89 BPM | HEIGHT: 64 IN | WEIGHT: 126.63 LBS | TEMPERATURE: 98 F | SYSTOLIC BLOOD PRESSURE: 88 MMHG | BODY MASS INDEX: 21.62 KG/M2 | RESPIRATION RATE: 16 BRPM | DIASTOLIC BLOOD PRESSURE: 64 MMHG

## 2024-11-27 DIAGNOSIS — I10 ESSENTIAL HYPERTENSION, BENIGN: ICD-10-CM

## 2024-11-27 DIAGNOSIS — I95.9 HYPOTENSION, UNSPECIFIED HYPOTENSION TYPE: ICD-10-CM

## 2024-11-27 DIAGNOSIS — T14.8XXA WOUND, OPEN: ICD-10-CM

## 2024-11-27 DIAGNOSIS — D50.9 IRON DEFICIENCY ANEMIA, UNSPECIFIED IRON DEFICIENCY ANEMIA TYPE: ICD-10-CM

## 2024-11-27 DIAGNOSIS — L89.152 PRESSURE INJURY OF SACRAL REGION, STAGE 2: ICD-10-CM

## 2024-11-27 DIAGNOSIS — R63.0 APPETITE LOSS: ICD-10-CM

## 2024-11-27 DIAGNOSIS — D64.9 ANEMIA, UNSPECIFIED TYPE: ICD-10-CM

## 2024-11-27 DIAGNOSIS — R53.1 WEAKNESS: Primary | ICD-10-CM

## 2024-11-27 LAB
ALBUMIN SERPL BCP-MCNC: 2.3 G/DL (ref 3.4–4.8)
ALBUMIN/GLOB SERPL: 0.5 {RATIO}
ALP SERPL-CCNC: 143 U/L (ref 40–150)
ALT SERPL W P-5'-P-CCNC: 13 U/L
ANION GAP SERPL CALCULATED.3IONS-SCNC: 15 MMOL/L (ref 7–16)
AST SERPL W P-5'-P-CCNC: 33 U/L (ref 5–34)
BASOPHILS # BLD AUTO: 0.03 K/UL (ref 0–0.2)
BASOPHILS NFR BLD AUTO: 0.3 % (ref 0–1)
BILIRUB SERPL-MCNC: 0.4 MG/DL
BILIRUB SERPL-MCNC: NEGATIVE MG/DL
BLOOD URINE, POC: NEGATIVE
BUN SERPL-MCNC: 17 MG/DL (ref 10–20)
BUN/CREAT SERPL: 25 (ref 6–20)
CALCIUM SERPL-MCNC: 9.1 MG/DL (ref 8.4–10.2)
CHLORIDE SERPL-SCNC: 87 MMOL/L (ref 98–107)
CLARITY, UA: CLEAR
CO2 SERPL-SCNC: 24 MMOL/L (ref 23–31)
COLOR, UA: YELLOW
CREAT SERPL-MCNC: 0.69 MG/DL (ref 0.55–1.02)
DIFFERENTIAL METHOD BLD: ABNORMAL
EGFR (NO RACE VARIABLE) (RUSH/TITUS): 87 ML/MIN/1.73M2
EOSINOPHIL # BLD AUTO: 0.09 K/UL (ref 0–0.5)
EOSINOPHIL NFR BLD AUTO: 0.8 % (ref 1–4)
ERYTHROCYTE [DISTWIDTH] IN BLOOD BY AUTOMATED COUNT: 13.2 % (ref 11.5–14.5)
GLOBULIN SER-MCNC: 4.4 G/DL (ref 2–4)
GLUCOSE SERPL-MCNC: 162 MG/DL (ref 82–115)
GLUCOSE UR QL STRIP: NEGATIVE
HCT VFR BLD AUTO: 32.2 % (ref 38–47)
HGB BLD-MCNC: 10.6 G/DL (ref 12–16)
IMM GRANULOCYTES # BLD AUTO: 0.27 K/UL (ref 0–0.04)
IMM GRANULOCYTES NFR BLD: 2.5 % (ref 0–0.4)
IRON SATN MFR SERPL: 16 % (ref 20–50)
IRON SERPL-MCNC: 25 UG/DL (ref 50–170)
KETONES UR QL STRIP: NEGATIVE
LEUKOCYTE ESTERASE URINE, POC: NEGATIVE
LYMPHOCYTES # BLD AUTO: 1.68 K/UL (ref 1–4.8)
LYMPHOCYTES NFR BLD AUTO: 15.5 % (ref 27–41)
MCH RBC QN AUTO: 30.8 PG (ref 27–31)
MCHC RBC AUTO-ENTMCNC: 32.9 G/DL (ref 32–36)
MCV RBC AUTO: 93.6 FL (ref 80–96)
MONOCYTES # BLD AUTO: 0.74 K/UL (ref 0–0.8)
MONOCYTES NFR BLD AUTO: 6.8 % (ref 2–6)
MPC BLD CALC-MCNC: 9.5 FL (ref 9.4–12.4)
NEUTROPHILS # BLD AUTO: 8 K/UL (ref 1.8–7.7)
NEUTROPHILS NFR BLD AUTO: 74.1 % (ref 53–65)
NITRITE, POC UA: NEGATIVE
NRBC # BLD AUTO: 0 X10E3/UL
NRBC, AUTO (.00): 0 %
PH, POC UA: 6.5
PLATELET # BLD AUTO: 528 K/UL (ref 150–400)
POTASSIUM SERPL-SCNC: 4.3 MMOL/L (ref 3.5–5.1)
PROT SERPL-MCNC: 6.7 G/DL (ref 5.8–7.6)
PROTEIN, POC: NORMAL
RBC # BLD AUTO: 3.44 M/UL (ref 4.2–5.4)
SODIUM SERPL-SCNC: 122 MMOL/L (ref 136–145)
SPECIFIC GRAVITY, POC UA: 1.01
TIBC SERPL-MCNC: 128 UG/DL (ref 70–310)
TIBC SERPL-MCNC: 153 UG/DL (ref 250–450)
TRANSFERRIN SERPL-MCNC: 137 MG/DL
UROBILINOGEN, POC UA: 1
WBC # BLD AUTO: 10.81 K/UL (ref 4.5–11)

## 2024-11-27 PROCEDURE — 85025 COMPLETE CBC W/AUTO DIFF WBC: CPT | Mod: ,,, | Performed by: CLINICAL MEDICAL LABORATORY

## 2024-11-27 PROCEDURE — 83550 IRON BINDING TEST: CPT | Mod: ,,, | Performed by: CLINICAL MEDICAL LABORATORY

## 2024-11-27 PROCEDURE — 83540 ASSAY OF IRON: CPT | Mod: ,,, | Performed by: CLINICAL MEDICAL LABORATORY

## 2024-11-27 PROCEDURE — 87106 FUNGI IDENTIFICATION YEAST: CPT | Mod: ,,, | Performed by: CLINICAL MEDICAL LABORATORY

## 2024-11-27 PROCEDURE — 87086 URINE CULTURE/COLONY COUNT: CPT | Mod: ,,, | Performed by: CLINICAL MEDICAL LABORATORY

## 2024-11-27 PROCEDURE — 80053 COMPREHEN METABOLIC PANEL: CPT | Mod: ,,, | Performed by: CLINICAL MEDICAL LABORATORY

## 2024-11-27 RX ORDER — FERROUS SULFATE 325(65) MG
325 TABLET ORAL DAILY
Qty: 90 TABLET | Refills: 0 | Status: SHIPPED | OUTPATIENT
Start: 2024-11-27

## 2024-11-27 RX ORDER — CYPROHEPTADINE HYDROCHLORIDE 4 MG/1
4 TABLET ORAL 3 TIMES DAILY PRN
Qty: 90 TABLET | Refills: 0 | Status: SHIPPED | OUTPATIENT
Start: 2024-11-27

## 2024-11-27 RX ORDER — IBUPROFEN 200 MG
200 TABLET ORAL
COMMUNITY

## 2024-11-27 RX ORDER — MUPIROCIN 20 MG/G
OINTMENT TOPICAL 3 TIMES DAILY
Qty: 22 G | Refills: 0 | Status: SHIPPED | OUTPATIENT
Start: 2024-11-27 | End: 2024-12-09 | Stop reason: SDUPTHER

## 2024-11-27 NOTE — PROGRESS NOTES
"   LANDON ALANIZ, JUSTIN   [facility]  [address]  [place]  [address]      PATIENT NAME: Yana Oates  : 1945  DATE: 24  MRN: [id number]        Reason for Visit / Chief Complaint: Weakness (Patient is an 79 year old female who presents to the clinic related to generalized weakness, denies pain today, feels sleepy/drowsy.  Patient walked in with borrowed walker, had a hard time getting up out of chair.  Patient's daughter reports she went to the ER on 10/16/2024 was given decadron for pain.  On 24 is when she really noticed a decline, couldn't get up off toilet, has become weaker since that day.  Patient is requesting a rx for walker & raised toilet seat. ), Anorexia (Patient has not had an appetite, last visit was 138, reports she is trying to drink Ensure, but can't drink a whole one, "too sweet".  ), and pressure sore  (Patient's daughter reports she has small pressure sore on right & left buttocks x 1.5 weeks, using A&D ointment & cornstarch. )         History of Present Illness / Problem Focused Workflow     Patient is an 79 year old female who presents to the clinic related to generalized weakness, denies pain today, feels sleepy/drowsy.  Patient walked in with borrowed walker, had a hard time getting up out of chair.  Patient's daughter reports she went to the ER on 10/16/2024 was given decadron for pain.  On 24 is when she really noticed a decline, couldn't get up off toilet, has become weaker since that day.  Patient is requesting a rx for walker & raised toilet seat.   Anorexia Patient has not had an appetite, last visit was 138, reports she is trying to drink Ensure, but can't drink a whole one, "too sweet".    pressure sore Patient's daughter reports she has small pressure sore on right & left buttocks x 1.5 weeks, using A&D ointment & cornstarch.          Review of Systems     @Review of Systems   Constitutional:  Positive for activity change. Negative for " chills, fatigue and fever.   HENT:  Negative for nasal congestion, ear discharge, ear pain, rhinorrhea, sinus pressure/congestion and sore throat.    Respiratory:  Negative for cough, chest tightness, shortness of breath, wheezing and stridor.    Cardiovascular:  Negative for palpitations and claudication.   Gastrointestinal:  Negative for abdominal pain, constipation, diarrhea, nausea, vomiting and reflux.   Genitourinary:  Negative for dysuria, flank pain, frequency, hematuria and urgency.   Musculoskeletal:  Positive for joint swelling (joint pain). Negative for myalgias.   Neurological:  Positive for weakness. Negative for dizziness, light-headedness and headaches.   Psychiatric/Behavioral:  Negative for suicidal ideas.        Medical / Social / Family History     Past Medical History:   Diagnosis Date    Anemia     Anxiety     Diabetes mellitus, type 2     Essential hypertension, benign     Fatigue     GERD (gastroesophageal reflux disease)     Low back pain     Osteoarthritis     Osteoporosis 10/02/2024    Other long term (current) drug therapy     Strain of muscle and tendon of back wall of thorax, initial encounter        Past Surgical History:   Procedure Laterality Date    APPENDECTOMY      CHOLECYSTECTOMY      HYSTERECTOMY      partial    LIPOMA RESECTION Left     LUNG LOBECTOMY Right     due to trauma           Medications and Allergies     Medications  Outpatient Medications Marked as Taking for the 11/27/24 encounter (Office Visit) with Rick Albert FNP   Medication Sig Dispense Refill    ALPRAZolam (XANAX) 0.25 MG tablet Take 1 tablet (0.25 mg total) by mouth 2 (two) times daily as needed for Anxiety. 60 tablet 2    amLODIPine (NORVASC) 10 MG tablet Take 1 tablet (10 mg total) by mouth once daily. 90 tablet 1    blood sugar diagnostic Strp by Misc.(Non-Drug; Combo Route) route. CHECK BLOOD SUGAR ONE TIME DAILY AND AS NEEDED      calcium citrate/vitamin D3 (CITRACAL + D ORAL) Take 1 tablet by mouth  once daily. 1000 mg calcium 25mcg (1000IU) of Vitamin D      carvediloL (COREG) 6.25 MG tablet Take 1 tablet (6.25 mg total) by mouth 2 (two) times daily with meals. 180 tablet 1    cloNIDine (CATAPRES) 0.2 MG tablet Take 0.5-1 tablets (0.1-0.2 mg total) by mouth 2 (two) times daily. 180 tablet 1    denosumab (PROLIA) 60 mg/mL Syrg Inject 1 mL (60 mg total) into the skin every 6 (six) months. 2 mL 0    hydroCHLOROthiazide (HYDRODIURIL) 25 MG tablet Take 1 tablet (25 mg total) by mouth once daily. 90 tablet 1    ibuprofen (ADVIL,MOTRIN) 200 MG tablet Take 200 mg by mouth as needed for Pain.      magnesium chloride (MAG 64) 64 mg TbEC Take 1 tablet (64 mg total) by mouth 2 (two) times daily. 180 tablet 1    meclizine (ANTIVERT) 12.5 mg tablet Take one tablet by mouth 3 times daily as needed for dizziness 90 tablet 2    metFORMIN (GLUCOPHAGE) 500 MG tablet Take 1 tablet (500 mg total) by mouth 2 (two) times a day. 180 tablet 1    multivit with min-folic acid (CENTRUM ADULT 50 PLUS) 80 mcg Chew       omeprazole (PRILOSEC) 20 MG capsule Take 1 capsule (20 mg total) by mouth 2 (two) times a day. 180 capsule 1    triamcinolone acetonide 0.1% (KENALOG) 0.1 % cream Apply topically 2 (two) times daily. 15 g 1    [DISCONTINUED] ferrous sulfate (IRON, FERROUS SULFATE,) 325 mg (65 mg iron) Tab tablet Take 1 tablet (325 mg total) by mouth once daily. 90 tablet 0       Allergies  Review of patient's allergies indicates:   Allergen Reactions    Ace inhibitors Anaphylaxis    Iodinated contrast media Swelling    Penicillins Anaphylaxis and Swelling    Sulfa (sulfonamide antibiotics) Hives, Rash and Swelling    Bactrim [sulfamethoxazole-trimethoprim]     Biaxin [clarithromycin]     Ciprofloxacin     Iodine and iodide containing products Hives    Mobic [meloxicam]     Trimethoprim     Iodine Rash    Latex Rash       Physical Examination     Vitals:    11/27/24 0937   BP: (!) 88/64   Pulse: 89   Resp: 16   Temp: 97.5 °F (36.4 °C)      Physical Exam  Vitals and nursing note reviewed.   Constitutional:       General: She is awake.      Appearance: Normal appearance.   HENT:      Head: Normocephalic.      Right Ear: Tympanic membrane, ear canal and external ear normal.      Left Ear: Tympanic membrane, ear canal and external ear normal.      Nose: Nose normal.      Mouth/Throat:      Lips: Pink.      Mouth: Mucous membranes are moist.      Pharynx: Oropharynx is clear. Uvula midline.   Cardiovascular:      Rate and Rhythm: Normal rate and regular rhythm.      Heart sounds: Normal heart sounds, S1 normal and S2 normal.   Pulmonary:      Effort: Pulmonary effort is normal. No respiratory distress.      Breath sounds: Normal breath sounds. No decreased breath sounds, wheezing, rhonchi or rales.   Abdominal:      General: Bowel sounds are normal.      Palpations: Abdomen is soft.      Tenderness: There is no abdominal tenderness.   Musculoskeletal:      Cervical back: Normal range of motion.   Skin:     General: Skin is warm.      Capillary Refill: Capillary refill takes less than 2 seconds.             Comments: Stage 2 pressure ulcer noted to left sacrum area.    Neurological:      Mental Status: She is alert and oriented to person, place, and time.   Psychiatric:         Thought Content: Thought content does not include homicidal or suicidal ideation. Thought content does not include homicidal or suicidal plan.               Procedures   Assessment and Plan (including Health Maintenance)   :        Problem List Items Addressed This Visit       Iron deficiency anemia    Current Assessment & Plan     Lab work today and will call pt with results         Relevant Medications    ferrous sulfate (IRON, FERROUS SULFATE,) 325 mg (65 mg iron) Tab tablet    Essential hypertension, benign    Current Assessment & Plan     BP is low today. Pt has been taking all of her BP medication. Discussed with daughter to make sure to take her BP before giving her  medication and if low then do not taken the clonidine and see if this helps.    Monitor BP daily and keep BP daily log to bring to next visit. Exercise at least 5 times a week. Keep scheduled appts. RTC sooner if BP is staying >140/90. Dash diet.    DASH- includes foods rich in K+, calcium and Magnesium.The diet limits foods high in sodium, saturated fats and added sugars. This is a flexible balanced eating plan that helps create heart healthy eating style for life. Diet is rich in vegetable, whole grains and fruits. It includes fat free or low fat dairy products, fish, poultry, nuts. Chose foods high in K+, calcium, magnesium, fiber, protein, and low in saturated fats and sodium.          Relevant Orders    CBC Auto Differential (Completed)    Comprehensive Metabolic Panel (Completed)    Weakness - Primary    Current Assessment & Plan     Lab work today. Will call pt with results and any new orders. Will refer to HH for physical therapy.          Relevant Orders    POCT URINALYSIS W/O SCOPE (Completed)    Urine culture (Completed)    Anemia    Relevant Orders    Iron and TIBC (Completed)    Appetite loss    Current Assessment & Plan     Discussed trying smaller meals throughout the day. Will add periactin. Pt states she has taken before and has helped.          Relevant Medications    cyproheptadine (PERIACTIN) 4 mg tablet    Hypotension    Pressure injury of sacral region, stage 2    Current Assessment & Plan     Will refer to HH for management of pressure ulcer.          Other Visit Diagnoses       Wound, open                Health Maintenance Topics with due status: Not Due       Topic Last Completion Date    Diabetes Urine Screening 07/18/2024    Lipid Panel 07/18/2024    Hemoglobin A1c 07/18/2024    DEXA Scan 11/07/2024       Future Appointments   Date Time Provider Department Center   12/18/2024  9:20 AM Rick Albert FNP [facility]   1/16/2025  2:00 PM AWV NURSE [facility] [place]  [facility]        Health Maintenance Due   Topic Date Due    Pneumococcal Vaccines (Age 65+) (1 of 2 - PCV) Never done    TETANUS VACCINE  Never done    Shingles Vaccine (1 of 2) Never done    RSV Vaccine (Age 60+ and Pregnant patients) (1 - 1-dose 75+ series) Never done    Eye Exam  07/11/2024    COVID-19 Vaccine (6 - 2024-25 season) 09/01/2024    Foot Exam  10/24/2024        Follow up in about 4 weeks (around 12/25/2024).     Signature:  WHIT DOUGLAS  [address] [place]  [address]    Date of encounter: 11/27/24

## 2024-11-29 LAB — UA COMPLETE W REFLEX CULTURE PNL UR: ABNORMAL

## 2024-12-02 DIAGNOSIS — B37.9 YEAST INFECTION: Primary | ICD-10-CM

## 2024-12-02 RX ORDER — FLUCONAZOLE 150 MG/1
150 TABLET ORAL DAILY
Qty: 3 TABLET | Refills: 0 | Status: SHIPPED | OUTPATIENT
Start: 2024-12-02

## 2024-12-09 DIAGNOSIS — T14.8XXA WOUND, OPEN: ICD-10-CM

## 2024-12-09 NOTE — TELEPHONE ENCOUNTER
Patients daughter called, she is out of Bactroban, requesting a refill.  She also asked about the raised toilet seat and the walker.  I did not see an order in system and she also said she had not heard from Home Health.  I didn't see any orders.

## 2024-12-10 RX ORDER — MUPIROCIN 20 MG/G
OINTMENT TOPICAL 3 TIMES DAILY
Qty: 22 G | Refills: 0 | Status: SHIPPED | OUTPATIENT
Start: 2024-12-10

## 2024-12-12 PROBLEM — R63.0 APPETITE LOSS: Status: ACTIVE | Noted: 2024-12-12

## 2024-12-12 PROBLEM — L89.152 PRESSURE INJURY OF SACRAL REGION, STAGE 2: Status: ACTIVE | Noted: 2024-12-12

## 2024-12-12 PROBLEM — D64.9 ANEMIA: Status: ACTIVE | Noted: 2024-12-12

## 2024-12-12 PROBLEM — R53.1 WEAKNESS: Status: ACTIVE | Noted: 2024-12-12

## 2024-12-12 PROBLEM — I95.9 HYPOTENSION: Status: ACTIVE | Noted: 2024-12-12

## 2024-12-12 NOTE — ASSESSMENT & PLAN NOTE
BP is low today. Pt has been taking all of her BP medication. Discussed with daughter to make sure to take her BP before giving her medication and if low then do not taken the clonidine and see if this helps.    Monitor BP daily and keep BP daily log to bring to next visit. Exercise at least 5 times a week. Keep scheduled appts. RTC sooner if BP is staying >140/90. Dash diet.    DASH- includes foods rich in K+, calcium and Magnesium.The diet limits foods high in sodium, saturated fats and added sugars. This is a flexible balanced eating plan that helps create heart healthy eating style for life. Diet is rich in vegetable, whole grains and fruits. It includes fat free or low fat dairy products, fish, poultry, nuts. Chose foods high in K+, calcium, magnesium, fiber, protein, and low in saturated fats and sodium.

## 2024-12-12 NOTE — ASSESSMENT & PLAN NOTE
Lab work today. Will call pt with results and any new orders. Will refer to  for physical therapy.

## 2024-12-12 NOTE — ASSESSMENT & PLAN NOTE
Discussed trying smaller meals throughout the day. Will add periactin. Pt states she has taken before and has helped.

## 2024-12-19 DIAGNOSIS — F32.A ANXIETY AND DEPRESSION: Primary | ICD-10-CM

## 2024-12-19 DIAGNOSIS — F41.9 ANXIETY AND DEPRESSION: Primary | ICD-10-CM

## 2024-12-19 RX ORDER — FLUOXETINE HYDROCHLORIDE 20 MG/1
20 CAPSULE ORAL DAILY
Qty: 30 CAPSULE | Refills: 11 | Status: SHIPPED | OUTPATIENT
Start: 2024-12-19 | End: 2025-12-19

## 2024-12-19 NOTE — PROGRESS NOTES
Home health performed PHQ9 at patient's home during her recent home health visit. Patient scored 18. Delores with [facility] came by clinic with documentation and PHQ 9 screening. New medication given for depression. Home health will notify patient.

## 2024-12-26 ENCOUNTER — TELEPHONE (OUTPATIENT)
Dept: FAMILY MEDICINE | Facility: CLINIC | Age: 79
End: 2024-12-26
Payer: MEDICARE

## 2024-12-26 NOTE — TELEPHONE ENCOUNTER
Pt and family need to make sure she is drinking and eating. She does need to hold her BP medication with these symptoms. They tried to admit her at [facility] for fluids but did not. If symptoms persist or worsen then she needs to be reevaluated in the ER. Make sure to keep next scheduled appt.

## 2024-12-26 NOTE — TELEPHONE ENCOUNTER
Pt went to ER at [facility] on 12/20/24 with low blood pressure and tachycardia. She was discharged after given some fluids,etc. She has been holding her Norvasc and Coreg. She is still taking HCTZ. Her heart rate has been from 35 to 170's with low b/p readings as reported from home health. I spoke with pt's daughter this morning she checked pt's blood pressure and pulse. B/P 86/69 and pulse- 121. Pt's daughter reports she is encouraging food and fluids. Denies any OB, chest pain, dizziness, etc.

## 2024-12-27 DIAGNOSIS — I10 ESSENTIAL HYPERTENSION, BENIGN: ICD-10-CM

## 2024-12-27 DIAGNOSIS — R63.0 APPETITE LOSS: ICD-10-CM

## 2024-12-27 RX ORDER — CYPROHEPTADINE HYDROCHLORIDE 4 MG/1
TABLET ORAL
Qty: 90 TABLET | Refills: 5 | Status: SHIPPED | OUTPATIENT
Start: 2024-12-27

## 2024-12-27 RX ORDER — CARVEDILOL 6.25 MG/1
6.25 TABLET ORAL 2 TIMES DAILY WITH MEALS
Qty: 180 TABLET | Refills: 0 | OUTPATIENT
Start: 2024-12-27

## 2025-01-08 ENCOUNTER — EXTERNAL HOME HEALTH (OUTPATIENT)
Dept: HOME HEALTH SERVICES | Facility: HOSPITAL | Age: 80
End: 2025-01-08
Payer: MEDICARE

## 2025-01-08 ENCOUNTER — OFFICE VISIT (OUTPATIENT)
Dept: FAMILY MEDICINE | Facility: CLINIC | Age: 80
End: 2025-01-08
Payer: MEDICARE

## 2025-01-08 VITALS
SYSTOLIC BLOOD PRESSURE: 104 MMHG | HEIGHT: 64 IN | TEMPERATURE: 97 F | WEIGHT: 106 LBS | BODY MASS INDEX: 18.1 KG/M2 | OXYGEN SATURATION: 99 % | DIASTOLIC BLOOD PRESSURE: 68 MMHG | HEART RATE: 91 BPM | RESPIRATION RATE: 20 BRPM

## 2025-01-08 DIAGNOSIS — D50.9 IRON DEFICIENCY ANEMIA, UNSPECIFIED IRON DEFICIENCY ANEMIA TYPE: ICD-10-CM

## 2025-01-08 DIAGNOSIS — Z13.1 SCREENING FOR DIABETES MELLITUS: ICD-10-CM

## 2025-01-08 DIAGNOSIS — R79.9 ABNORMAL FINDING OF BLOOD CHEMISTRY, UNSPECIFIED: ICD-10-CM

## 2025-01-08 DIAGNOSIS — E11.9 TYPE 2 DIABETES MELLITUS WITHOUT COMPLICATION, WITHOUT LONG-TERM CURRENT USE OF INSULIN: Primary | ICD-10-CM

## 2025-01-08 LAB
ALBUMIN SERPL BCP-MCNC: 2.7 G/DL (ref 3.4–4.8)
ALBUMIN/GLOB SERPL: 0.6 {RATIO}
ALP SERPL-CCNC: 93 U/L (ref 40–150)
ALT SERPL W P-5'-P-CCNC: 10 U/L
ANION GAP SERPL CALCULATED.3IONS-SCNC: 20 MMOL/L (ref 7–16)
AST SERPL W P-5'-P-CCNC: 14 U/L (ref 5–34)
BASOPHILS # BLD AUTO: 0.04 K/UL (ref 0–0.2)
BASOPHILS NFR BLD AUTO: 0.4 % (ref 0–1)
BILIRUB SERPL-MCNC: 0.2 MG/DL
BUN SERPL-MCNC: 99 MG/DL (ref 10–20)
BUN/CREAT SERPL: 20 (ref 6–20)
CALCIUM SERPL-MCNC: 9 MG/DL (ref 8.4–10.2)
CHLORIDE SERPL-SCNC: 110 MMOL/L (ref 98–107)
CO2 SERPL-SCNC: 16 MMOL/L (ref 23–31)
CREAT SERPL-MCNC: 4.92 MG/DL (ref 0.55–1.02)
DIFFERENTIAL METHOD BLD: ABNORMAL
EGFR (NO RACE VARIABLE) (RUSH/TITUS): 8 ML/MIN/1.73M2
EOSINOPHIL # BLD AUTO: 0.02 K/UL (ref 0–0.5)
EOSINOPHIL NFR BLD AUTO: 0.2 % (ref 1–4)
ERYTHROCYTE [DISTWIDTH] IN BLOOD BY AUTOMATED COUNT: 16.3 % (ref 11.5–14.5)
EST. AVERAGE GLUCOSE BLD GHB EST-MCNC: 154 MG/DL
GLOBULIN SER-MCNC: 4.6 G/DL (ref 2–4)
GLUCOSE SERPL-MCNC: 165 MG/DL (ref 82–115)
HBA1C MFR BLD HPLC: 7 %
HCT VFR BLD AUTO: 34 % (ref 38–47)
HGB BLD-MCNC: 10.2 G/DL (ref 12–16)
IMM GRANULOCYTES # BLD AUTO: 0.05 K/UL (ref 0–0.04)
IMM GRANULOCYTES NFR BLD: 0.5 % (ref 0–0.4)
IRON SATN MFR SERPL: 27 % (ref 20–50)
IRON SERPL-MCNC: 45 UG/DL (ref 50–170)
LYMPHOCYTES # BLD AUTO: 2.31 K/UL (ref 1–4.8)
LYMPHOCYTES NFR BLD AUTO: 22.9 % (ref 27–41)
MCH RBC QN AUTO: 29.6 PG (ref 27–31)
MCHC RBC AUTO-ENTMCNC: 30 G/DL (ref 32–36)
MCV RBC AUTO: 98.6 FL (ref 80–96)
MONOCYTES # BLD AUTO: 0.45 K/UL (ref 0–0.8)
MONOCYTES NFR BLD AUTO: 4.5 % (ref 2–6)
MPC BLD CALC-MCNC: 11.2 FL (ref 9.4–12.4)
NEUTROPHILS # BLD AUTO: 7.22 K/UL (ref 1.8–7.7)
NEUTROPHILS NFR BLD AUTO: 71.5 % (ref 53–65)
NRBC # BLD AUTO: 0 X10E3/UL
NRBC, AUTO (.00): 0 %
PLATELET # BLD AUTO: 379 K/UL (ref 150–400)
POTASSIUM SERPL-SCNC: 3.6 MMOL/L (ref 3.5–5.1)
PROT SERPL-MCNC: 7.3 G/DL (ref 5.8–7.6)
RBC # BLD AUTO: 3.45 M/UL (ref 4.2–5.4)
SODIUM SERPL-SCNC: 142 MMOL/L (ref 136–145)
TIBC SERPL-MCNC: 123 UG/DL (ref 70–310)
TIBC SERPL-MCNC: 168 UG/DL (ref 250–450)
TRANSFERRIN SERPL-MCNC: 158 MG/DL
WBC # BLD AUTO: 10.09 K/UL (ref 4.5–11)

## 2025-01-08 PROCEDURE — 83550 IRON BINDING TEST: CPT | Mod: ,,, | Performed by: CLINICAL MEDICAL LABORATORY

## 2025-01-08 PROCEDURE — 99213 OFFICE O/P EST LOW 20 MIN: CPT | Mod: ,,,

## 2025-01-08 PROCEDURE — 85025 COMPLETE CBC W/AUTO DIFF WBC: CPT | Mod: ,,, | Performed by: CLINICAL MEDICAL LABORATORY

## 2025-01-08 PROCEDURE — 80053 COMPREHEN METABOLIC PANEL: CPT | Mod: ,,, | Performed by: CLINICAL MEDICAL LABORATORY

## 2025-01-08 PROCEDURE — 83036 HEMOGLOBIN GLYCOSYLATED A1C: CPT | Mod: ,,, | Performed by: CLINICAL MEDICAL LABORATORY

## 2025-01-08 PROCEDURE — 83540 ASSAY OF IRON: CPT | Mod: ,,, | Performed by: CLINICAL MEDICAL LABORATORY

## 2025-01-14 ENCOUNTER — DOCUMENT SCAN (OUTPATIENT)
Dept: HOME HEALTH SERVICES | Facility: HOSPITAL | Age: 80
End: 2025-01-14
Payer: MEDICARE

## 2025-01-16 ENCOUNTER — OUTSIDE PLACE OF SERVICE (OUTPATIENT)
Dept: ADMINISTRATIVE | Facility: HOSPITAL | Age: 80
End: 2025-01-16
Payer: MEDICARE

## 2025-01-16 PROCEDURE — 99233 SBSQ HOSP IP/OBS HIGH 50: CPT | Mod: ,,, | Performed by: INTERNAL MEDICINE

## 2025-01-27 ENCOUNTER — PATIENT OUTREACH (OUTPATIENT)
Dept: ADMINISTRATIVE | Facility: CLINIC | Age: 80
End: 2025-01-27
Payer: MEDICARE

## 2025-01-27 ENCOUNTER — EXTERNAL HOSPITAL ADMISSION (OUTPATIENT)
Dept: ADMINISTRATIVE | Facility: CLINIC | Age: 80
End: 2025-01-27
Payer: MEDICARE

## 2025-01-27 NOTE — PROGRESS NOTES
C3 nurse attempted to contact patient for a [facility] post hospital discharge follow-up call. Spoke with daughter, daughter declined call at this time.

## 2025-02-03 ENCOUNTER — DOCUMENT SCAN (OUTPATIENT)
Dept: HOME HEALTH SERVICES | Facility: HOSPITAL | Age: 80
End: 2025-02-03
Payer: MEDICARE

## 2025-02-13 ENCOUNTER — DOCUMENT SCAN (OUTPATIENT)
Dept: HOME HEALTH SERVICES | Facility: HOSPITAL | Age: 80
End: 2025-02-13
Payer: MEDICARE

## 2025-03-17 ENCOUNTER — DOCUMENT SCAN (OUTPATIENT)
Dept: HOME HEALTH SERVICES | Facility: HOSPITAL | Age: 80
End: 2025-03-17
Payer: MEDICARE

## 2025-03-20 ENCOUNTER — TELEPHONE (OUTPATIENT)
Dept: FAMILY MEDICINE | Facility: CLINIC | Age: 80
End: 2025-03-20
Payer: MEDICARE

## 2025-03-20 NOTE — TELEPHONE ENCOUNTER
Patient's daughter came by clinic with life insurance paperwork to be completed. Patient passed away on 02/17/25 at [facility].

## 2025-03-22 ENCOUNTER — DOCUMENT SCAN (OUTPATIENT)
Dept: HOME HEALTH SERVICES | Facility: HOSPITAL | Age: 80
End: 2025-03-22
Payer: MEDICARE

## 2025-03-26 ENCOUNTER — TELEPHONE (OUTPATIENT)
Dept: FAMILY MEDICINE | Facility: CLINIC | Age: 80
End: 2025-03-26
Payer: MEDICARE

## 2025-03-26 NOTE — TELEPHONE ENCOUNTER
----- Message from Syeda sent at 3/26/2025  1:58 PM CDT -----  Who Called: Yana Barboza is requesting assistance/information from provider's office.Pts daughter is calling regarding paperwork Mrs. Sosa was going to fill out. Daughter is checking on the status of the papers. Preferred Method of Contact: Phone CallPatient's Preferred Phone Number on File: [phone] Call Back Number, if different:Additional Information:

## 2025-03-26 NOTE — TELEPHONE ENCOUNTER
Notified patient's daughter, insurance paperwork has been filled out. Waiting on provider's signature. Will call Alice when ready to .